# Patient Record
Sex: FEMALE | Race: WHITE | NOT HISPANIC OR LATINO
[De-identification: names, ages, dates, MRNs, and addresses within clinical notes are randomized per-mention and may not be internally consistent; named-entity substitution may affect disease eponyms.]

---

## 2022-04-20 ENCOUNTER — NON-APPOINTMENT (OUTPATIENT)
Age: 52
End: 2022-04-20

## 2022-04-20 ENCOUNTER — APPOINTMENT (OUTPATIENT)
Dept: INTERNAL MEDICINE | Facility: CLINIC | Age: 52
End: 2022-04-20
Payer: COMMERCIAL

## 2022-04-20 VITALS
DIASTOLIC BLOOD PRESSURE: 70 MMHG | OXYGEN SATURATION: 98 % | WEIGHT: 156 LBS | HEIGHT: 68 IN | RESPIRATION RATE: 16 BRPM | TEMPERATURE: 98.6 F | SYSTOLIC BLOOD PRESSURE: 128 MMHG | HEART RATE: 88 BPM | BODY MASS INDEX: 23.64 KG/M2

## 2022-04-20 DIAGNOSIS — Z00.00 ENCOUNTER FOR GENERAL ADULT MEDICAL EXAMINATION W/OUT ABNORMAL FINDINGS: ICD-10-CM

## 2022-04-20 DIAGNOSIS — Z72.89 OTHER PROBLEMS RELATED TO LIFESTYLE: ICD-10-CM

## 2022-04-20 PROCEDURE — 99396 PREV VISIT EST AGE 40-64: CPT | Mod: 25

## 2022-04-20 PROCEDURE — 93000 ELECTROCARDIOGRAM COMPLETE: CPT | Mod: 59

## 2022-04-20 NOTE — COUNSELING
[Behavioral health counseling provided] : Behavioral health counseling provided [Hazards of at-risk alcohol use discussed] : Hazards of at-risk alcohol use discussed [Strategies to reduce or eliminate alcohol use discussed] : Strategies to reduce or eliminate alcohol use discussed [Participate in Treatment Program] : Participate in treatment program

## 2022-04-20 NOTE — PLAN
[FreeTextEntry1] : Do FBW- will call with results.  \par Psychotherapy advised.  Pt has therapist she will contact.  Declines starting SSRI. Encouraged increased exercise, yoga, meditation. \par Must decrease Alcohol intake.  Referred to [facility] for support services.  \par Dental exam q 6 months\par Colonoscopy stressed Referrals provided \par GYN yearly - Referred to . \par Yearly eye exam.\par Derm yearly for skin exam. \par Yearly flu vaccine.\par Discussed Shingrix vaccination at age 52.   Outlined benefits and risks and current recommendation.  \par SHe will consider and obtain vaccination at his local pharmacy.\par \par Stressed importance to maintain a normal body weight by following a  healthy diet  and lifestyle to maintain good health and prevent illness.  \par Diet should consist of lean meats, fish, fruits and vegetables, whole grains and fiber and healthy fats.  White starches, sweets, high fat dairy should be limited. \par  \par \par \par

## 2022-04-20 NOTE — HISTORY OF PRESENT ILLNESS
[FreeTextEntry1] : Establish care/CPE  [de-identified] : Patient is 51 year old female PMH Breast Cancer who presents for CPE and to establish care.  \par She was diagnosed with Breast cancer 2018 and had BL mastectomy with reconstruction.   She did not require chemo/radiation. \leslee Obtains all GYN care at [facility]. She has not had her period in several months.  Last visit 1 1/2 years.   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 \par She has a son who is Autistic, non-verbal who lives in a residential facility.  Drinks wine or vodka 4 times a week and believes she should be cutting back. Intake increased during pandemic.  For the last year having panic attacks, feels  easily frustrated with frequent mood swings.  She denies depression, any SI/HI.  Feels overwhelmed at times due to stress at work and marital situation.  Had seen Psychotherapist in past but not since before pandemic.  \par

## 2022-04-20 NOTE — PHYSICAL EXAM
[Supple] : supple [Normal] : no respiratory distress, lungs were clear to auscultation bilaterally and no accessory muscle use [Normal Rate] : normal rate  [Regular Rhythm] : with a regular rhythm [Normal S1, S2] : normal S1 and S2

## 2022-04-20 NOTE — HEALTH RISK ASSESSMENT
[Never] : Never [Yes] : Yes [4 or more  times a week (4 pts)] : 4 or more  times a week (4 points) [1 or 2 (0 pts)] : 1 or 2 (0 points) [Never (0 pts)] : Never (0 points) [No] : In the past 12 months have you used drugs other than those required for medical reasons? No [Patient reported PAP Smear was normal] : Patient reported PAP Smear was normal [PapSmearDate] : 01/21

## 2022-11-04 RX ORDER — AZITHROMYCIN 500 MG/1
0 TABLET, FILM COATED ORAL
Qty: 0 | Refills: 0 | DISCHARGE
Start: 2022-11-04 | End: 2022-11-08

## 2022-11-28 ENCOUNTER — INPATIENT (INPATIENT)
Facility: HOSPITAL | Age: 52
LOS: 2 days | Discharge: HOME | DRG: 280 | End: 2022-12-01
Attending: HOSPITALIST | Admitting: HOSPITALIST
Payer: COMMERCIAL

## 2022-11-28 ENCOUNTER — RESULT REVIEW (OUTPATIENT)
Age: 52
End: 2022-11-28

## 2022-11-28 VITALS — HEIGHT: 64 IN | WEIGHT: 138.89 LBS

## 2022-11-28 DIAGNOSIS — J90 PLEURAL EFFUSION, NOT ELSEWHERE CLASSIFIED: ICD-10-CM

## 2022-11-28 DIAGNOSIS — E43 UNSPECIFIED SEVERE PROTEIN-CALORIE MALNUTRITION: ICD-10-CM

## 2022-11-28 LAB
ADD ON TEST-SPECIMEN IN LAB: SIGNIFICANT CHANGE UP
ALBUMIN SERPL ELPH-MCNC: 2.2 G/DL — LOW (ref 3.3–5)
ALP SERPL-CCNC: 133 U/L — HIGH (ref 40–120)
ALT FLD-CCNC: 32 U/L — SIGNIFICANT CHANGE UP (ref 12–78)
ANION GAP SERPL CALC-SCNC: 9 MMOL/L — SIGNIFICANT CHANGE UP (ref 5–17)
APPEARANCE UR: ABNORMAL
APTT BLD: 37.5 SEC — HIGH (ref 27.5–35.5)
AST SERPL-CCNC: 195 U/L — HIGH (ref 15–37)
BASE EXCESS BLDV CALC-SCNC: 3.1 MMOL/L — SIGNIFICANT CHANGE UP
BASOPHILS # BLD AUTO: 0.07 K/UL — SIGNIFICANT CHANGE UP (ref 0–0.2)
BASOPHILS NFR BLD AUTO: 0.7 % — SIGNIFICANT CHANGE UP (ref 0–2)
BILIRUB SERPL-MCNC: 8.1 MG/DL — HIGH (ref 0.2–1.2)
BILIRUB UR-MCNC: ABNORMAL
BUN SERPL-MCNC: 14 MG/DL — SIGNIFICANT CHANGE UP (ref 7–23)
CALCIUM SERPL-MCNC: 9.3 MG/DL — SIGNIFICANT CHANGE UP (ref 8.5–10.1)
CHLORIDE SERPL-SCNC: 100 MMOL/L — SIGNIFICANT CHANGE UP (ref 96–108)
CK SERPL-CCNC: 34 U/L — SIGNIFICANT CHANGE UP (ref 26–192)
CO2 BLDV-SCNC: 28 MMOL/L — HIGH (ref 22–26)
CO2 SERPL-SCNC: 27 MMOL/L — SIGNIFICANT CHANGE UP (ref 22–31)
COLOR SPEC: ABNORMAL
CREAT SERPL-MCNC: 0.7 MG/DL — SIGNIFICANT CHANGE UP (ref 0.5–1.3)
DIFF PNL FLD: ABNORMAL
EGFR: 105 ML/MIN/1.73M2 — SIGNIFICANT CHANGE UP
EOSINOPHIL # BLD AUTO: 0.1 K/UL — SIGNIFICANT CHANGE UP (ref 0–0.5)
EOSINOPHIL NFR BLD AUTO: 1 % — SIGNIFICANT CHANGE UP (ref 0–6)
FLUAV AG NPH QL: SIGNIFICANT CHANGE UP
FLUBV AG NPH QL: SIGNIFICANT CHANGE UP
GLUCOSE SERPL-MCNC: 118 MG/DL — HIGH (ref 70–99)
GLUCOSE UR QL: NEGATIVE — SIGNIFICANT CHANGE UP
HCG SERPL-ACNC: <1 MIU/ML — SIGNIFICANT CHANGE UP
HCO3 BLDV-SCNC: 27 MMOL/L — SIGNIFICANT CHANGE UP (ref 22–29)
HCT VFR BLD CALC: 24.8 % — LOW (ref 34.5–45)
HGB BLD-MCNC: 8.6 G/DL — LOW (ref 11.5–15.5)
IMM GRANULOCYTES NFR BLD AUTO: 0.3 % — SIGNIFICANT CHANGE UP (ref 0–0.9)
INR BLD: 1.72 RATIO — HIGH (ref 0.88–1.16)
KETONES UR-MCNC: ABNORMAL
LACTATE SERPL-SCNC: 1.8 MMOL/L — SIGNIFICANT CHANGE UP (ref 0.7–2)
LEUKOCYTE ESTERASE UR-ACNC: ABNORMAL
LYMPHOCYTES # BLD AUTO: 1.84 K/UL — SIGNIFICANT CHANGE UP (ref 1–3.3)
LYMPHOCYTES # BLD AUTO: 19.1 % — SIGNIFICANT CHANGE UP (ref 13–44)
MAGNESIUM SERPL-MCNC: 1.3 MG/DL — LOW (ref 1.6–2.6)
MCHC RBC-ENTMCNC: 34.7 GM/DL — SIGNIFICANT CHANGE UP (ref 32–36)
MCHC RBC-ENTMCNC: 38.6 PG — HIGH (ref 27–34)
MCV RBC AUTO: 111.2 FL — HIGH (ref 80–100)
MONOCYTES # BLD AUTO: 0.65 K/UL — SIGNIFICANT CHANGE UP (ref 0–0.9)
MONOCYTES NFR BLD AUTO: 6.8 % — SIGNIFICANT CHANGE UP (ref 2–14)
NEUTROPHILS # BLD AUTO: 6.93 K/UL — SIGNIFICANT CHANGE UP (ref 1.8–7.4)
NEUTROPHILS NFR BLD AUTO: 72.1 % — SIGNIFICANT CHANGE UP (ref 43–77)
NITRITE UR-MCNC: POSITIVE
NT-PROBNP SERPL-SCNC: 203 PG/ML — HIGH (ref 0–125)
PCO2 BLDV: 38 MMHG — LOW (ref 39–42)
PH BLDV: 7.46 — HIGH (ref 7.32–7.43)
PH UR: 5 — SIGNIFICANT CHANGE UP (ref 5–8)
PLATELET # BLD AUTO: 100 K/UL — LOW (ref 150–400)
PO2 BLDV: 33 MMHG — SIGNIFICANT CHANGE UP
POTASSIUM SERPL-MCNC: 3.3 MMOL/L — LOW (ref 3.5–5.3)
POTASSIUM SERPL-SCNC: 3.3 MMOL/L — LOW (ref 3.5–5.3)
PROT SERPL-MCNC: 8.8 GM/DL — HIGH (ref 6–8.3)
PROT UR-MCNC: 30 MG/DL
PROTHROM AB SERPL-ACNC: 20.1 SEC — HIGH (ref 10.5–13.4)
RBC # BLD: 2.23 M/UL — LOW (ref 3.8–5.2)
RBC # FLD: 15.9 % — HIGH (ref 10.3–14.5)
RSV RNA NPH QL NAA+NON-PROBE: SIGNIFICANT CHANGE UP
SAO2 % BLDV: 56.8 % — SIGNIFICANT CHANGE UP
SARS-COV-2 RNA SPEC QL NAA+PROBE: SIGNIFICANT CHANGE UP
SODIUM SERPL-SCNC: 136 MMOL/L — SIGNIFICANT CHANGE UP (ref 135–145)
SP GR SPEC: 1.03 — HIGH (ref 1.01–1.02)
TROPONIN I, HIGH SENSITIVITY RESULT: 5.8 NG/L — SIGNIFICANT CHANGE UP
TSH SERPL-MCNC: 3.27 UU/ML — SIGNIFICANT CHANGE UP (ref 0.34–4.82)
UROBILINOGEN FLD QL: 4
WBC # BLD: 9.62 K/UL — SIGNIFICANT CHANGE UP (ref 3.8–10.5)
WBC # FLD AUTO: 9.62 K/UL — SIGNIFICANT CHANGE UP (ref 3.8–10.5)

## 2022-11-28 PROCEDURE — 82140 ASSAY OF AMMONIA: CPT

## 2022-11-28 PROCEDURE — 80053 COMPREHEN METABOLIC PANEL: CPT

## 2022-11-28 PROCEDURE — 87086 URINE CULTURE/COLONY COUNT: CPT

## 2022-11-28 PROCEDURE — 86708 HEPATITIS A ANTIBODY: CPT

## 2022-11-28 PROCEDURE — 99223 1ST HOSP IP/OBS HIGH 75: CPT | Mod: GC

## 2022-11-28 PROCEDURE — 71275 CT ANGIOGRAPHY CHEST: CPT | Mod: 26,MA

## 2022-11-28 PROCEDURE — 83540 ASSAY OF IRON: CPT

## 2022-11-28 PROCEDURE — 85610 PROTHROMBIN TIME: CPT

## 2022-11-28 PROCEDURE — 88305 TISSUE EXAM BY PATHOLOGIST: CPT

## 2022-11-28 PROCEDURE — 88312 SPECIAL STAINS GROUP 1: CPT

## 2022-11-28 PROCEDURE — 89051 BODY FLUID CELL COUNT: CPT

## 2022-11-28 PROCEDURE — 71045 X-RAY EXAM CHEST 1 VIEW: CPT | Mod: 26

## 2022-11-28 PROCEDURE — 80048 BASIC METABOLIC PNL TOTAL CA: CPT

## 2022-11-28 PROCEDURE — 86880 COOMBS TEST DIRECT: CPT

## 2022-11-28 PROCEDURE — 85025 COMPLETE CBC W/AUTO DIFF WBC: CPT

## 2022-11-28 PROCEDURE — 93306 TTE W/DOPPLER COMPLETE: CPT

## 2022-11-28 PROCEDURE — 85027 COMPLETE CBC AUTOMATED: CPT

## 2022-11-28 PROCEDURE — 80307 DRUG TEST PRSMV CHEM ANLYZR: CPT

## 2022-11-28 PROCEDURE — 87389 HIV-1 AG W/HIV-1&-2 AB AG IA: CPT

## 2022-11-28 PROCEDURE — 84157 ASSAY OF PROTEIN OTHER: CPT

## 2022-11-28 PROCEDURE — 82607 VITAMIN B-12: CPT

## 2022-11-28 PROCEDURE — 80074 ACUTE HEPATITIS PANEL: CPT

## 2022-11-28 PROCEDURE — 82042 OTHER SOURCE ALBUMIN QUAN EA: CPT

## 2022-11-28 PROCEDURE — 36415 COLL VENOUS BLD VENIPUNCTURE: CPT

## 2022-11-28 PROCEDURE — 86706 HEP B SURFACE ANTIBODY: CPT

## 2022-11-28 PROCEDURE — 49083 ABD PARACENTESIS W/IMAGING: CPT

## 2022-11-28 PROCEDURE — 85385 FIBRINOGEN ANTIGEN: CPT

## 2022-11-28 PROCEDURE — 87075 CULTR BACTERIA EXCEPT BLOOD: CPT

## 2022-11-28 PROCEDURE — 82728 ASSAY OF FERRITIN: CPT

## 2022-11-28 PROCEDURE — 99285 EMERGENCY DEPT VISIT HI MDM: CPT

## 2022-11-28 PROCEDURE — 82525 ASSAY OF COPPER: CPT

## 2022-11-28 PROCEDURE — 81025 URINE PREGNANCY TEST: CPT

## 2022-11-28 PROCEDURE — 82746 ASSAY OF FOLIC ACID SERUM: CPT

## 2022-11-28 PROCEDURE — 86704 HEP B CORE ANTIBODY TOTAL: CPT

## 2022-11-28 PROCEDURE — 86039 ANTINUCLEAR ANTIBODIES (ANA): CPT

## 2022-11-28 PROCEDURE — 82248 BILIRUBIN DIRECT: CPT

## 2022-11-28 PROCEDURE — 85045 AUTOMATED RETICULOCYTE COUNT: CPT

## 2022-11-28 PROCEDURE — 83550 IRON BINDING TEST: CPT

## 2022-11-28 PROCEDURE — 93010 ELECTROCARDIOGRAM REPORT: CPT

## 2022-11-28 PROCEDURE — 85730 THROMBOPLASTIN TIME PARTIAL: CPT

## 2022-11-28 PROCEDURE — 74177 CT ABD & PELVIS W/CONTRAST: CPT | Mod: 26,MA

## 2022-11-28 PROCEDURE — 83615 LACTATE (LD) (LDH) ENZYME: CPT

## 2022-11-28 PROCEDURE — 86255 FLUORESCENT ANTIBODY SCREEN: CPT

## 2022-11-28 PROCEDURE — 88108 CYTOPATH CONCENTRATE TECH: CPT

## 2022-11-28 PROCEDURE — 88342 IMHCHEM/IMCYTCHM 1ST ANTB: CPT

## 2022-11-28 PROCEDURE — 83010 ASSAY OF HAPTOGLOBIN QUANT: CPT

## 2022-11-28 PROCEDURE — 87070 CULTURE OTHR SPECIMN AEROBIC: CPT

## 2022-11-28 RX ORDER — IBUPROFEN 200 MG
600 TABLET ORAL ONCE
Refills: 0 | Status: DISCONTINUED | OUTPATIENT
Start: 2022-11-28 | End: 2022-11-28

## 2022-11-28 RX ORDER — BROMPHENIRAMINE MALEATE, PSEUDOEPHEDRINE HYDROCHLORIDE, AND DEXTROMETHORPHAN HYDROBROMIDE 2; 10; 30 MG/5ML; MG/5ML; MG/5ML
5 SOLUTION ORAL
Qty: 0 | Refills: 0 | DISCHARGE

## 2022-11-28 RX ORDER — SODIUM CHLORIDE 9 MG/ML
500 INJECTION INTRAMUSCULAR; INTRAVENOUS; SUBCUTANEOUS ONCE
Refills: 0 | Status: COMPLETED | OUTPATIENT
Start: 2022-11-28 | End: 2022-11-28

## 2022-11-28 RX ORDER — IBUPROFEN 200 MG
400 TABLET ORAL ONCE
Refills: 0 | Status: COMPLETED | OUTPATIENT
Start: 2022-11-28 | End: 2022-11-28

## 2022-11-28 RX ORDER — PIPERACILLIN AND TAZOBACTAM 4; .5 G/20ML; G/20ML
3.38 INJECTION, POWDER, LYOPHILIZED, FOR SOLUTION INTRAVENOUS ONCE
Refills: 0 | Status: COMPLETED | OUTPATIENT
Start: 2022-11-28 | End: 2022-11-28

## 2022-11-28 RX ORDER — POTASSIUM CHLORIDE 20 MEQ
20 PACKET (EA) ORAL ONCE
Refills: 0 | Status: COMPLETED | OUTPATIENT
Start: 2022-11-28 | End: 2022-11-28

## 2022-11-28 RX ORDER — ONDANSETRON 8 MG/1
4 TABLET, FILM COATED ORAL EVERY 8 HOURS
Refills: 0 | Status: DISCONTINUED | OUTPATIENT
Start: 2022-11-28 | End: 2022-12-01

## 2022-11-28 RX ORDER — LANOLIN ALCOHOL/MO/W.PET/CERES
3 CREAM (GRAM) TOPICAL AT BEDTIME
Refills: 0 | Status: DISCONTINUED | OUTPATIENT
Start: 2022-11-28 | End: 2022-12-01

## 2022-11-28 RX ADMIN — Medication 400 MILLIGRAM(S): at 21:19

## 2022-11-28 RX ADMIN — PIPERACILLIN AND TAZOBACTAM 200 GRAM(S): 4; .5 INJECTION, POWDER, LYOPHILIZED, FOR SOLUTION INTRAVENOUS at 15:14

## 2022-11-28 RX ADMIN — Medication 400 MILLIGRAM(S): at 22:05

## 2022-11-28 RX ADMIN — SODIUM CHLORIDE 500 MILLILITER(S): 9 INJECTION INTRAMUSCULAR; INTRAVENOUS; SUBCUTANEOUS at 15:15

## 2022-11-28 RX ADMIN — Medication 20 MILLIEQUIVALENT(S): at 21:20

## 2022-11-28 NOTE — ED STATDOCS - NS ED ATTENDING STATEMENT MOD
This was a shared visit with the LORE. I reviewed and verified the documentation and independently performed the documented:

## 2022-11-28 NOTE — H&P ADULT - ATTENDING COMMENTS
pt hx, exam, data and A/P discussed w Dr. Choe and pt independently interviewed and examined    VSS    temp not recorded but pt given motrin in ED    #Cirrhosis w portal HTN  #Current alcohol use  #prior /remote cocaine use  1.trend LFT  2. hepatitis panel  3. daily weights  4. IR for paracentesis  5. GI to assist in diuretics, need for beta blocker to reduce portal pressure  starting lactulose if NH3 increased but MS clear  6. discussed follow up w AA, GI as outpt  7. discussed need for nutrition consult to assit in improving diet and eventually albumen manufacture by liver  8. informed of existance of transplant specialists at [facility] as well  9. avoidance of OTC meds except as approved by GI    #Hx breast CA s/p bilat mastectomies and reconstruction w breast surgeon  reported that she did not need to see oncologist ; did not require RT/chemo  did not know ER or OR status but knew she is BRCA neg  1. heme onc consult re next    #Lytic lesions on sternum/manubrium  she is aware of a prominence of her sternum on the R and the breast implant being more obvious on the left   discussed infection, benign and malignant processes  1. will need to discuss approach to dx w heme onc assistance    #Anemia  #Macrocytic w MCV 11  1. B12, folate, TSH  2. iron studies were also requested ;  I was unable to review slide to see if ther were any microcytic cells    60 sec pt hx, exam, data and A/P discussed w Dr. Choe and pt independently interviewed and examined    VSS    temp not recorded but pt given motrin in ED    #Cirrhosis w portal HTN  #Current alcohol use  #prior /remote cocaine use  1.trend LFT  2. hepatitis panel  3. daily weights  4. IR for paracentesis  5. GI to assist in diuretics, need for beta blocker to reduce portal pressure  starting lactulose if NH3 increased but MS clear  6. discussed follow up w AA, GI as outpt  7. discussed need for nutrition consult to assit in improving diet and eventually albumen manufacture by liver  8. informed of existance of transplant specialists at [facility] as well  9. avoidance of OTC meds except as approved by GI    #Hx breast CA s/p bilat mastectomies and reconstruction w breast surgeon  reported that she did not need to see oncologist ; did not require RT/chemo  did not know ER or [facility] status but knew she is BRCA neg  1. heme onc consult re next    #Lytic lesions on sternum/manubrium  she is aware of a prominence of her sternum on the R and the breast implant being more obvious on the left   discussed infection, benign and malignant processes  1. will need to discuss approach to dx w heme onc assistance    #Anemia  #Macrocytic w MCV 11  1. B12, folate, TSH  2. iron studies were also requested ;  I was unable to review slide to see if ther were any microcytic cells    60 minutes

## 2022-11-28 NOTE — H&P ADULT - NSHPLABSRESULTS_GEN_ALL_CORE
history of BRCA gene. Rule out pneumonia or pulmonary embolism.    COMPARISON: None.    CONTRAST/COMPLICATIONS:  IV Contrast: Omnipaque 350 (accession [id number]), IV contrast documented   in associated exam (accession [id number])  90 cc administered   10 cc   discarded  Oral Contrast: Other (accession [id number]), NONE (accession [id number])  Complications: None reported at time of study completion    PROCEDURE:  CT of the Chest, Abdomen and Pelvis was performed.  Sagittal and coronal reformats were performed.    FINDINGS:  CHEST:  LUNGS AND LARGE AIRWAYS: Patent central airways. 5 mm right right lung   pulmonary nodule near the minor fissure, with morphology compatible with   a benign intrapulmonary lymph node.  PLEURA: Small left pleural effusion.  VESSELS: No pulmonary embolism. Normal caliber aorta.  HEART: Heart size is normal. No pericardial effusion.  MEDIASTINUM AND DAVID: No lymphadenopathy. Extensive esophageal and   paraesophageal varices noted.  CHEST WALL AND LOWER NECK: Status post bilateral mastectomy and breast   reconstructions.    ABDOMEN AND PELVIS:  LIVER: Morphologic changes compatible with advanced cirrhosis. No focal   liver lesion.  BILE DUCTS: Normal caliber.  GALLBLADDER: Cholelithiasis.  SPLEEN: Enlarged, measuring 15.6 cm.  PANCREAS: Within normal limits.  ADRENALS: Within normal limits.  KIDNEYS/URETERS: Within normal limits.    BLADDER: Within normal limits.  REPRODUCTIVE ORGANS: Intrauterine device within the uterus. Multiple   uterine fibroids. Unremarkable adnexa.    BOWEL: No bowel obstruction. Appendix is not identified.  PERITONEUM: Moderate volume ascites.  VESSELS: Patent portal veins. Esophageal and paraesophageal varices.   Gastrohepatic ligament and left upper quadrant varices with spontaneous   splenorenal shunt.  RETROPERITONEUM/LYMPH NODES: No lymphadenopathy.  ABDOMINAL WALL: Within normal limits.  BONES: Diffuse lytic change and areas of cortical destruction involving   the sternal manubrium and body.    IMPRESSION:  No pulmonary embolism.    Small left pleural effusion.    Cirrhosis, splenomegaly, portal hypertension and moderate ascites.    Esophageal varices.    Diffuse lytic change of the sternum suspicious for metastatic disease.

## 2022-11-28 NOTE — ED STATDOCS - CARE PLAN
1 Principal Discharge DX:	Liver cirrhosis  Secondary Diagnosis:	Portal hypertension  Secondary Diagnosis:	Ascites  Secondary Diagnosis:	Esophageal varices in cirrhosis  Secondary Diagnosis:	Large pleural effusion

## 2022-11-28 NOTE — H&P ADULT - REASON FOR ADMISSION
Transitional Care Follow Up Visit  Subjective     Faviola Issa is a 69 y.o. female who presents for a transitional care management visit.    Within 48 business hours after discharge our office contacted her via telephone to coordinate her care and needs.      I reviewed and discussed the details of that call along with the discharge summary, hospital problems, inpatient lab results, inpatient diagnostic studies, and consultation reports with Faviola.     Current outpatient and discharge medications have been reconciled for the patient.    No flowsheet data found.  Risk for Readmission (LACE) Score: 7 (5/8/2019  6:00 AM)  having some occipital h/a also since surgery    History of Present Illness   Course During Hospital Stay:  5-6-19 initially had thyroidectomy and parathyroidectomy with Dr. Daly; 8 hours later developed hematoma and had to be surgically drained by Dr Daly same day---he did place one drain. She was to f/u 2 days later..  He is watching thyroid labs and calcium.    Also note pre-op EKG was no change    Dr Romeo does regular colonoscopy--every 3 yrs    I had her see oncology 2017 d/t elevated Ca and alk phos.  With hx of colon cancer and lung cancer, I was concerned if this could be cancer??;  Also has hx lymphoma of eyelid 1988  We now know the Ca was from parathyroid;  Seeing Dr Daly  Gets yearly CXR for work!!    She is still having SOA with walking;   This has been since surgery---had hematoma  She is doing home spirometer and is improving  I will still get CXR and make sure no pneumaonia  ENT wants me to check labs today  Still numbness left upper ext  Current outpatient and discharge medications have been reconciled for the patient.  Reviewed by: Gema Johnson PA-C  X-Ray  Interpretation report in house X-rays that I personally viewed    Relevant Clinical Issues/Diagnoses/Indications: SOA, recent surgery, neck hematoma, hx lung cancer        Clinical Findings:  Fullness right  hilum; upper limits heart size;           Comparative Data:  Not here          Date of Previous X-ray:    Change on current X-ray:      The following portions of the patient's history were reviewed and updated as appropriate: allergies, current medications, past family history, past medical history, past social history, past surgical history and problem list.    Review of Systems   Constitutional: Negative for activity change, appetite change and unexpected weight change.   HENT: Positive for sore throat. Negative for nosebleeds and trouble swallowing.    Eyes: Negative for pain and visual disturbance.   Respiratory: Positive for shortness of breath. Negative for chest tightness and wheezing.    Cardiovascular: Negative for chest pain and palpitations.   Gastrointestinal: Negative for abdominal pain and blood in stool.   Endocrine: Negative.    Genitourinary: Negative for difficulty urinating and hematuria.   Musculoskeletal: Positive for joint swelling and neck pain.   Skin: Negative for color change and rash.   Allergic/Immunologic: Negative.    Neurological: Positive for numbness and headaches. Negative for syncope and speech difficulty.   Hematological: Negative for adenopathy.   Psychiatric/Behavioral: Negative for agitation and confusion.   All other systems reviewed and are negative.      Objective   Physical Exam   Constitutional: She is oriented to person, place, and time. She appears well-developed and well-nourished. No distress.   HENT:   Head: Normocephalic and atraumatic.   Eyes: Conjunctivae and EOM are normal. Pupils are equal, round, and reactive to light. Right eye exhibits no discharge. Left eye exhibits no discharge. No scleral icterus.   Neck: Normal range of motion. Neck supple. No tracheal deviation present. No thyromegaly present.   Edema in incision area neck anterior; sore; incision sore but healing   Cardiovascular: Normal rate, regular rhythm, normal heart sounds, intact distal pulses and  SOB, weight loss, abdominal distention, generalized weakness normal pulses. Exam reveals no gallop.   No murmur heard.  Pulmonary/Chest: Effort normal and breath sounds normal. No respiratory distress. She has no wheezes. She has no rales.   Musculoskeletal: Normal range of motion.   Neurological: She is alert and oriented to person, place, and time. She exhibits normal muscle tone. Coordination normal.   Skin: Skin is warm. No rash noted. No erythema. No pallor.   Anterior neck incision healing   Psychiatric: She has a normal mood and affect. Her behavior is normal. Judgment and thought content normal.   Nursing note and vitals reviewed.      Assessment/Plan   Faviola was seen today for surgery follow up.    Diagnoses and all orders for this visit:    Hospital discharge follow-up  -     Comprehensive Metabolic Panel  -     T4, Free  -     T3, Free  -     TSH  -     CBC & Differential  -     Vitamin D 25 Hydroxy  -     PTH, Intact  -     Calcium, Ionized  -     XR Chest PA & Lateral    Dyspnea, unspecified type  -     Comprehensive Metabolic Panel  -     T4, Free  -     T3, Free  -     TSH  -     CBC & Differential  -     Vitamin D 25 Hydroxy  -     PTH, Intact  -     Calcium, Ionized  -     XR Chest PA & Lateral    Status post parathyroidectomy (CMS/HCC)  -     Comprehensive Metabolic Panel  -     T4, Free  -     T3, Free  -     TSH  -     CBC & Differential  -     Vitamin D 25 Hydroxy  -     PTH, Intact  -     Calcium, Ionized  -     XR Chest PA & Lateral    Postoperative hypothyroidism  -     Comprehensive Metabolic Panel  -     T4, Free  -     T3, Free  -     TSH  -     CBC & Differential  -     Vitamin D 25 Hydroxy  -     PTH, Intact  -     Calcium, Ionized  -     XR Chest PA & Lateral    Malignant neoplasm of sigmoid colon (CMS/HCC)             hosp f/u and concern some SOA; will get CXR  Labs today and fax ENT Dr Daly

## 2022-11-28 NOTE — H&P ADULT - CONVERSATION DETAILS
Pt would like to be DNR/DNI if she is going to be fully bed bound. If she is not, then she would like to be full code. Pt would like to be DNR/DNI if she is going to be fully bed bound. If she is not, then she would like to be full code.      Pt states she does not want to die (ARCurryMD)

## 2022-11-28 NOTE — PATIENT PROFILE ADULT - FUNCTIONAL ASSESSMENT - DAILY ACTIVITY 4.
4 = No assist / stand by assistance Mohs Rapid Report Verbiage: The area of clinically evident tumor was marked with skin marking ink and appropriately hatched.  The initial incision was made following the Mohs approach through the skin.  The specimen was taken to the lab, divided into the necessary number of pieces, chromacoded and processed according to the Mohs protocol.  This was repeated in successive stages until a tumor free defect was achieved.

## 2022-11-28 NOTE — H&P ADULT - NSHPREVIEWOFSYSTEMS_GEN_ALL_CORE
REVIEW OF SYSTEMS:  CONSTITUTIONAL: + weakness, + fevers. No chills  EYES/ENT: + visual changes;  No vertigo or throat pain   RESPIRATORY: No cough, wheezing; + shortness of breath  CARDIOVASCULAR: No chest pain or palpitations  GASTROINTESTINAL: + abdominal distension; No abdominal or epigastric pain. No nausea, vomiting; No diarrhea or constipation.   GENITOURINARY: No dysuria, frequency or hematuria  NEUROLOGICAL: No numbness or weakness  SKIN: No itching, rashes REVIEW OF SYSTEMS:  CONSTITUTIONAL: + weakness, + fevers. No chills +weight loss 16  EYES/ENT: + visual changes;  No vertigo or throat pain   RESPIRATORY: No cough, wheezing; + shortness of breath  CARDIOVASCULAR: No chest pain or palpitations  GASTROINTESTINAL: + abdominal distension; No abdominal or epigastric pain. No nausea, vomiting; No diarrhea or constipation.   GENITOURINARY: No dysuria, frequency or hematuria  NEUROLOGICAL: No numbness or weakness  SKIN: No itching, rashes REVIEW OF SYSTEMS:  CONSTITUTIONAL: + weakness/fatigue, + fevers. No chills +weight loss 16  EYES/ENT: + visual changes;  No vertigo or throat pain   RESPIRATORY: No cough, wheezing; + shortness of breath  CARDIOVASCULAR: No chest pain or palpitations  GASTROINTESTINAL: + abdominal distension; No abdominal or epigastric pain. No nausea, vomiting; No diarrhea or constipation.   GENITOURINARY: No dysuria, frequency or hematuria  NEUROLOGICAL: No numbness or weakness  SKIN: No itching, rashes  Psych anxious about current findings REVIEW OF SYSTEMS:  CONSTITUTIONAL: + weakness/fatigue, + fevers. No chills +weight loss 16  EYES/ENT: + visual changes;  No vertigo or throat pain   RESPIRATORY: No cough, wheezing; + shortness of breath  CARDIOVASCULAR: No chest pain or palpitations  GASTROINTESTINAL: + abdominal distension; No abdominal or epigastric pain. No nausea, vomiting; No diarrhea or constipation.   GENITOURINARY: No dysuria, frequency or hematuria  NEUROLOGICAL: No numbness or weakness  SKIN: No itching, rashes  PSYCH anxious about current findings

## 2022-11-28 NOTE — H&P ADULT - ASSESSMENT
52 year old female with a PMH of Breast CA s/p mastectomy in 2018, COVID in 10/2022, Alcohol abuse disorder (actively participating in AA with sponsor), History of Cocaine use (quit in 1999), Former smoker (quit in 2015) presenting with symptoms of palpitations, shortness of breath, generalized weakness with abdominal distention. Admitted for advanced cirrhosis with portal hypertension.     #Advanced Cirrhosis  #Portal Hypertension  -admit to Med/Surg  -Pt presenting with nonspecific symptoms including fatigue, SOB, anorexia  -History of chronic alcohol abuse, currently daily use. Last drink on 11/24  -CT A/P +moderate ascites, +gastroesophageal/esophageal varices, + cirrhosis +splenomegaly  - CXR + L pleural effusion, r/o involvement of lungs with cirrhosis, consider spironolactone?  - Hepatitis panel pending  - Serum Ammonia: 83  - Blood alcohol <0.10  - Ceruloplasmin, Copper pending  - Iron studies pending  - Hgb 8.6 (unknown baseline), Plt 100  - If Hgb <7, transfuse  - Type and screen; obtain blood consent  - INR 1.72,   - K 3.3, repleted  - Bilirubin 8.1, , , Lipase 534. scleral icterus noted on PE  - Mg 1.3, repleted with 2g IV over 2 hours  - Abdominal distension +fluid shift. IR consult placed for paracentesis  - NPO after midnight    #Suspected Bone METS  - Pt with 16 pound weight loss in about 1-2 years  - CT A/P:     #DVT ppx   52 year old female with a PMH of Breast CA s/p mastectomy in 2018, COVID in 10/2022, Alcohol abuse disorder (actively participating in AA with sponsor), History of Cocaine use (quit in 1999), Former smoker (quit in 2015) presenting with symptoms of palpitations, shortness of breath, generalized weakness with abdominal distention. Admitted for advanced cirrhosis with portal hypertension.     #Advanced Cirrhosis  #Portal Hypertension  -admit to Med/Surg  - Pt presenting with nonspecific symptoms including fatigue, SOB, anorexia  - Reported fever of 101F, given motrin in ED  - History of chronic alcohol abuse, currently daily use. Last drink on 11/24  -CT A/P +moderate ascites, +gastroesophageal/esophageal varices, + cirrhosis +splenomegaly  - CXR + L pleural effusion, r/o involvement of lungs with cirrhosis, consider spironolactone?  - Blood Culture x2 pending, r/o infection/pna  - Hepatitis panel pending  - Serum Ammonia: 83  - Blood alcohol <0.10  - Ceruloplasmin, Copper pending  - Iron studies pending  - Hgb 8.6 (unknown baseline), Plt 100  - If Hgb <7, transfuse  - Type and screen; obtain blood consent  - INR 1.72,   - K 3.3, repleted  - Bilirubin 8.1, , , Lipase 534. scleral icterus noted on PE  - Mg 1.3, repleted with 2g IV over 2 hours  - Abdominal distension +fluid shift. IR consult placed for paracentesis  - NPO after midnight  - Nutrition consult placed    #Suspected Bone METS  - Pt with 16 pound weight loss in about 1-2 years  - CT A/P: + suspected Metastasis to sternum  - ALP elevated at 133    #Abnormal Urinalysis  - Denies urinary symptoms  - UA +moderate bilirubin, proteinuria, bacteruria, nitrites, trace blood and trace LE.   - Urine Culture pending    #DVT ppx    #Disposition:   - SW consult placed  - SBIRT 52 year old female with a PMH of Breast CA s/p mastectomy in 2018, COVID in 10/2022, Alcohol abuse disorder (actively participating in AA with sponsor), History of Cocaine use (quit in 1999), Former smoker (quit in 2015) presenting with symptoms of palpitations, shortness of breath, generalized weakness with abdominal distention. Admitted for advanced cirrhosis with portal hypertension.     #Advanced Cirrhosis  #Portal Hypertension  -admit to Med/Surg  - Pt presenting with nonspecific symptoms including fatigue, SOB, anorexia  - Reported fever of 101F, given motrin in ED  - History of chronic alcohol abuse, currently daily use. Last drink on 11/24  -CT A/P +moderate ascites, +gastroesophageal/esophageal varices, + cirrhosis +splenomegaly  - Child Kilgore Score: 12, life expectancy 1-3 years; Abdominal surgery periop mortality 82%  - MELD-Na: 22- 7-10% estimated 90 day mortality  - CXR + L pleural effusion, r/o involvement of lungs with cirrhosis, consider spironolactone?  - Blood Culture x2 pending, r/o infection/pna  - Hepatitis panel pending  - Serum Ammonia: 83  - Blood alcohol <0.10  - Ceruloplasmin, Copper pending  - Iron studies pending  - Hgb 8.6 (unknown baseline), Plt 100  - If Hgb <7, transfuse  - Type and screen; obtain blood consent  - INR 1.72,   - K 3.3, repleted  - Bilirubin 8.1, , , Lipase 534. scleral icterus noted on PE  - Mg 1.3, repleted with 2g IV over 2 hours  - Abdominal distension +fluid shift. IR consult placed for paracentesis  - NPO after midnight  - Nutrition consult placed    #Suspected Bone METS  - Pt with 16 pound weight loss in about 1-2 years  - CT A/P: + suspected Metastasis to sternum  - ALP elevated at 133    #Abnormal Urinalysis  - Denies urinary symptoms  - UA +moderate bilirubin, proteinuria, bacteruria, nitrites, trace blood and trace LE.   - Urine Culture pending    #DVT ppx    #Disposition:   - SW consult placed  - SBIRT 52 year old female with a PMH of Breast CA s/p mastectomy in 2018, COVID in 10/2022, Alcohol abuse disorder (actively participating in AA with sponsor), History of Cocaine use (quit in 1999), Former smoker (quit in 2015) presenting with symptoms of palpitations, shortness of breath, generalized weakness with abdominal distention. Admitted for advanced cirrhosis with portal hypertension course complicated by suspected metastasis to sternum, Pleural Effusion, Macrocytic Anemia and Abnormal UA.     #Advanced Cirrhosis  #Portal Hypertension  -admit to Med/Surg  - Pt presenting with nonspecific symptoms including fatigue, SOB, anorexia  - Reported fever of 101F, given motrin in ED  - History of chronic alcohol abuse, currently daily use. Last drink on 11/24  - Abdominal distension +fluid shift.  - CT A/P +moderate ascites, +gastroesophageal/esophageal varices, + cirrhosis +splenomegaly  - Child Kilgore Score: 12, life expectancy 1-3 years; Abdominal surgery periop mortality 82%  - MELD-Na: 22- 7-10% estimated 90 day mortality  - Serum Ammonia: 83  - Blood alcohol <0.10  - INR 1.72,   - Bilirubin 8.1, , , Lipase 534; scleral icterus noted on PE  - Mg 1.3, repleted, Phos WNL  - K 3.3, repleted, Na WNL  - Hepatitis panel pending  - Copper pending  - Iron studies pending  - TTE pending   - PO Lactulose, PO thiamine, PO Folate, PO Multivitamin ordered  - IR consult ordered for paracentesis  - GI consult ordered  - NPO after midnight  - Nutrition consult placed for hypoalbuminemia and weight loss    #Macrocytic Anemia   - Hgb 8.6 (unknown baseline)  - MCV : 111.2, suspect macrocytic anemia  - B12 and Folate pending  - Folate supplementation ordered  - Heme/Onc Consult ordered  - If Hgb <7, transfuse  - Type and screen; obtain blood consent    #Pleural Effusion  - pt presenting for complaints of SOB  - On PE, satting within 95-99% on RA, no use of accessory muscles  - CXR + L pleural effusion  - CTA : negative for PE; + small pleural effusion   - Blood Culture x2 pending, r/o infection/pna    #Suspected Bone METS  - Pt with 16 pound weight loss in about 1-2 years  - CT A/P: + suspected Metastasis to sternum  - ALP elevated at 133  - Heme/Onc consult ordered    #Abnormal Urinalysis  - Denies urinary symptoms  - UA +moderate bilirubin, proteinuria, bacteruria, nitrites, trace blood and trace LE.   - will treat presentation like UTI   - Urine Culture pending    #DVT ppx:     #Disposition:   - SW consult placed  - SBIRT 52 year old female with a PMH of Breast CA s/p mastectomy in 2018, COVID in 10/2022, Alcohol abuse disorder (actively participating in AA with sponsor), History of Cocaine use (quit in 1999), Former smoker (quit in 2015) presenting with symptoms of palpitations, shortness of breath, generalized weakness with abdominal distention. Admitted for advanced cirrhosis with portal hypertension course complicated by suspected metastasis to sternum, Pleural Effusion, Macrocytic Anemia and Abnormal UA.     #Advanced Cirrhosis  #Portal Hypertension  -admit to Med/Surg  - Pt presenting with nonspecific symptoms including fatigue, SOB, anorexia  - Reported fever of 101F, given motrin in ED  - History of chronic alcohol abuse, currently daily use. Last drink on 11/24  - Abdominal distension +fluid shift.  - CT A/P +moderate ascites, +gastroesophageal/esophageal varices, + cirrhosis +splenomegaly  - Child Kilgore Score: 12, life expectancy 1-3 years; Abdominal surgery periop mortality 82%  - MELD-Na: 22- 7-10% estimated 90 day mortality  - Serum Ammonia: 83  - Blood alcohol <0.10  - INR 1.72,   - Bilirubin 8.1, , , Lipase 534; scleral icterus noted on PE  - Mg 1.3, repleted, Phos WNL  - K 3.3, repleted, Na WNL  - Hepatitis panel pending  - Copper pending  - Iron studies pending  - TTE pending   - PO Lactulose, PO thiamine, PO Folate, PO Multivitamin ordered  - IR consult ordered for paracentesis  - GI consult ordered  - NPO after midnight  - Nutrition consult placed for hypoalbuminemia and weight loss  - Strict I/Os    #Macrocytic Anemia   - Hgb 8.6 (unknown baseline)  - MCV : 111.2, suspect macrocytic anemia  - B12 and Folate pending  - Folate supplementation ordered  - Heme/Onc Consult ordered  - If Hgb <7, transfuse  - Type and screen; obtain blood consent    #Pleural Effusion  - pt presenting for complaints of SOB  - On PE, satting within 95-99% on RA, no use of accessory muscles  - CXR + L pleural effusion  - CTA : negative for PE; + small pleural effusion   - Blood Culture x2 pending, r/o infection/pna    #Suspected Bone METS  - Pt with 16 pound weight loss in about 1-2 years  - CT A/P: + suspected Metastasis to sternum  - ALP elevated at 133  - Heme/Onc consult ordered    #Abnormal Urinalysis  - Denies urinary symptoms  - UA +moderate bilirubin, proteinuria, bacteruria, nitrites, trace blood and trace LE.   - will treat presentation like UTI   - Urine Culture pending    #DVT ppx: hold at this time    #Disposition:   - SW consult placed  - SBIRT

## 2022-11-28 NOTE — ED STATDOCS - OBJECTIVE STATEMENT
51 y/o female with a PMHx of breast cancer s/p mastectomy in 2018 in remission, COVID in October 2022, varicose veins presents to the ED c/o SOB and nonproductive cough x10 days. Pt was tested for flu and COVID, both resulting negative. +intermittent palpitations, +generalized weakness, +weight loss. Pt noticed her abd is distended. Denies CP. Vaccinated and boosted for COVID. Former smoker. No other complaints at this time.

## 2022-11-28 NOTE — H&P ADULT - HISTORY OF PRESENT ILLNESS
52 year old female with a PMH of Breast CA s/p mastectomy in 2018, COVID in  52 year old female with a PMH of Breast CA s/p mastectomy in 2018, COVID in 10/2022, Alcohol abuse disorder (actively participating in AA with sponsor), History of Cocaine use (quit in 1999), Former smoker (quit in 2015) presenting with symptoms of palpitations, shortness of breath, generalized weakness with abdominal distention. Patient also has complaints of 16 lb weight loss in about the span of a year. Patient reports difficulty breathing while at home with generalized weakness when attempting to get out of bed. She checked her oxygen level with a pulse ox at home and was sating at 91-92% on RA, which prompted her admission to the hospital for suspected residual COVID symptoms. Patient has extensive personal and family history of alcohol use. Last alcoholic drink was on 11/24/22. Reports increase in usage since 2020 when COVID started, with reported daily alcohol usage, mostly vodka. Patient states that use of alcohol and recreational drug use runs in her family. All of her family members are alcoholics. Biological mother is       Fever of 101 in ED, given motrin 52 year old female with a PMH of Breast CA s/p mastectomy in 2018, COVID in 10/2022, Alcohol abuse disorder (actively participating in AA with sponsor), History of Cocaine use (quit in 1999), Former smoker (quit in 2015) presenting with symptoms of palpitations, shortness of breath, generalized weakness with abdominal distention. Patient also has complaints of 16 lb weight loss in about the span of a year. Patient reports difficulty breathing while at home with generalized weakness when attempting to get out of bed. She checked her oxygen level with a pulse ox at home and was sating at 91-92% on RA, which prompted her admission to the hospital for suspected residual COVID symptoms. Patient has extensive personal and family history of alcohol use. Last alcoholic drink was on 11/24/22. Reports increase in usage since 2020 when COVID started, with reported daily alcohol usage, mostly vodka. Patient states that use of alcohol and recreational drug use runs in her family. All of her family members are alcoholics. Patient reports that biological mother is a drug dealer and biological father is a drug addict, as well as siblings, nephews and nieces.       Fever of 101 in ED, given motrin 52 year old female with a PMH of Breast CA s/p mastectomy in 2018, COVID in 10/2022, Alcohol abuse disorder (actively participating in AA with sponsor), History of Cocaine use (quit in 1999), Former smoker (quit in 2015) presenting with symptoms of palpitations, shortness of breath, generalized weakness with abdominal distention. Patient also has complaints of 16 lb weight loss in about the span of a year. Patient reports difficulty breathing while at home with generalized weakness when attempting to get out of bed. She checked her oxygen level with a pulse ox at home and was sating at 91-92% on RA, which prompted her admission to the hospital for suspected residual COVID symptoms. Patient has extensive personal and family history of alcohol use. Last alcoholic drink was on 11/24/22. Reports increase in usage since 2020 when COVID started, with reported daily alcohol usage, mostly vodka. Patient states that use of alcohol and recreational drug use runs in her family. All of her family members are alcoholics. Patient reports that biological mother is a drug dealer and biological father is a drug addict, as well as siblings, nephews and nieces. Denies chest pain, nausea, vomiting, urinary symptoms, diarrhea or constipation. Last bowel movement was this morning. Patient does not have a PCP. Patient has not gotten a colonoscopy. Denies recent travel or extensive travel time. Reports use of compression socks with long car rides.     In the ED VSS except for Fever of 101 in ED, given motrin 52 year old female with a PMH of Breast CA s/p mastectomy in 2018, COVID in 10/2022, Alcohol abuse disorder (actively participating in AA with sponsor), History of Cocaine use (quit in 1999), Former smoker (quit in 2015) presenting with symptoms of palpitations, shortness of breath, generalized weakness with abdominal distention. Patient also has complaints of 16 lb weight loss in about the span of a year. Patient reports difficulty breathing while at home with generalized weakness when attempting to get out of bed. She checked her oxygen level with a pulse ox at home and was sating at 91-92% on RA, which prompted her admission to the hospital for suspected residual COVID symptoms. Patient has extensive personal and family history of alcohol use. Last alcoholic drink was on 11/24/22. Reports increase in usage since 2020 when COVID started, with reported daily alcohol usage, mostly vodka. No prior hospitalizations for alcohol use, No prior treatment for alcohol use, no history of seizures/delirium tremens. Patient states that use of alcohol and recreational drug use runs in her family. All of her family members are alcoholics. Patient reports that biological mother is a drug dealer and biological father is a drug addict, as well as siblings, nephews and nieces. Denies chest pain, nausea, vomiting, urinary symptoms, diarrhea or constipation. Last bowel movement was this morning. Patient does not have a PCP. Patient has not gotten a colonoscopy. Denies recent travel or extensive travel time. Reports use of compression socks with long car rides.     In the ED VSS except for reported fever of 101 in ED. Patient was given motrin. Satting at 97%. Hgb 8.6 (unknown baseline), Plt 100, INR 1.72, K 3.3, Bilirubin 8.1, , , Mg 1.3, . UA +moderate bilirubin, proteinuria, bacteruria, nitrites, trace blood and trace LE. Negative for COVID, RSV, Influenza. CXR: Blunting of left costophrenic angle, suspect L pleural effusion with rounded opacity in left lung. CT A/P: +cirrhosis with portal HTN with moderate volume ascites. + diffuse lytic change of sternum suspicious for metastatic disease. CTA Chest: negative for PE. + small L pleural effusion.  52 year old female with a PMH of Breast CA s/p mastectomy in 2018, COVID in 10/2022, Alcohol abuse disorder (actively participating in AA with sponsor), History of Cocaine use (quit in 1999), Former smoker (quit in 2015) presenting with symptoms of palpitations, shortness of breath, generalized weakness with abdominal distention.     Patient also has complaints of 16 lb weight loss in about the span of a year. Patient reports difficulty breathing while at home with generalized weakness when attempting to get out of bed. She checked her oxygen level with a pulse ox at home and was sating at 91-92% on RA, which prompted her admission to the hospital for suspected residual COVID symptoms. Patient has extensive personal and family history of alcohol use. Last alcoholic drink was on 11/24/22. Reports increase in usage since 2020 when COVID started, with reported daily alcohol usage, mostly vodka. No prior hospitalizations for alcohol use, No prior treatment for alcohol use, no history of seizures/delirium tremens. Patient states that use of alcohol and recreational drug use runs in her family. All of her family members are alcoholics. Patient reports that biological mother is a drug dealer and biological father is a drug addict, as well as siblings, nephews and nieces. Denies chest pain, nausea, vomiting, urinary symptoms, diarrhea or constipation. Last bowel movement was this morning. Patient does not have a PCP. Patient has not gotten a colonoscopy. Denies recent travel or extensive travel time. Reports use of compression socks with long car rides.     In the ED VSS except for reported fever of 101 in ED. Patient was given motrin. Satting at 97%. Hgb 8.6 (unknown baseline), Plt 100, INR 1.72, K 3.3, Bilirubin 8.1, , , Mg 1.3, . UA +moderate bilirubin, proteinuria, bacteruria, nitrites, trace blood and trace LE. Negative for COVID, RSV, Influenza. CXR: Blunting of left costophrenic angle, suspect L pleural effusion with rounded opacity in left lung. CT A/P: +cirrhosis with portal HTN with moderate volume ascites. + diffuse lytic change of sternum suspicious for metastatic disease. CTA Chest: negative for PE. + small L pleural effusion.

## 2022-11-28 NOTE — ED ADULT NURSE NOTE - NS ED NOTE ABUSE SUSPICION NEGLECT YN
-- DO NOT REPLY / DO NOT REPLY ALL --  -- Message is from the [facility]--    Patient is requesting a medication refill - medication is on active list, but patient is requesting a change to the medication prescription    Change requested: Patient states that she takes this only once a day but prescription states twice a day.  Please clarify.    RX Name and Dose:   lovastatin (MEVACOR) 20 MG tablet    Duration: n/a days    Pharmacy  [facility] [address] [place] [address]    Patient confirmed the above pharmacy as correct?  Yes    Caller Information       Type Contact Phone    03/03/2021 12:15 PM CST Phone (Incoming) [address] [facility] (Pharmacy) [phone]          Alternative phone number: none    Turnaround time given to caller:   \"This message will be sent to [state Provider's name]. The clinical team will fulfill your request as soon as they review your message.\"  
LVMTCB for clarification  
No

## 2022-11-28 NOTE — H&P ADULT - NSHPPHYSICALEXAM_GEN_ALL_CORE
VITALS:   T(C): 37.2 (11-29-22 @ 00:00), Max: 38.3 (11-28-22 @ 20:28)  HR: 93 (11-29-22 @ 00:00) (93 - 110)  BP: 119/62 (11-29-22 @ 00:00) (101/54 - 123/72)  RR: 18 (11-29-22 @ 00:00) (18 - 19)  SpO2: 92% (11-29-22 @ 00:00) (92% - 99%)    GENERAL: NAD, lying in bed comfortably; emotional; episodes of tearfulness  HEAD:  Atraumatic, Normocephalic  EYES: EOMI, +scleral icterus; no strabismus  ENT: Moist mucous membranes  NECK: Supple  RESPIRATORY: Clear to auscultation bilaterally; No rales, rhonchi, wheezing, or rubs. Unlabored respirations  CARDIOVASCULAR: Regular rate; + murmur, No rubs, or gallops  GASTROINTESTINAL: BSx4; Soft, nontender, +distension +fluid shift  EXTREMITIES: + edema L>R; 2+ Peripheral Pulses, brisk capillary refill. No clubbing, cyanosis  NEUROLOGICAL:  A&Ox3, no focal deficits   SKIN: No rashes or lesions VITALS:   T(C): 37.2 (11-29-22 @ 00:00), Max: 38.3 (11-28-22 @ 20:28)  HR: 93 (11-29-22 @ 00:00) (93 - 110)  BP: 119/62 (11-29-22 @ 00:00) (101/54 - 123/72)  RR: 18 (11-29-22 @ 00:00) (18 - 19)  SpO2: 92% (11-29-22 @ 00:00) (92% - 99%)    GENERAL: NAD, lying in bed comfortably; emotional; episodes of tearfulness; looking younger than her stated age  HEAD:  Atraumatic, Normocephalic  EYES: EOMI, +scleral icterus; no strabismus  ENT: Moist mucous membranes  NECK: Supple  RESPIRATORY: Clear to auscultation bilaterally; No rales, rhonchi, wheezing, or rubs. Unlabored respirations  CARDIOVASCULAR: Regular rate; + murmur, No rubs, or gallops  GASTROINTESTINAL: BSx4; Soft, nontender, +distension +fluid shift+splenomegaly detected by percussion  EXTREMITIES: + edema L>R; 2+ Peripheral Pulses, brisk capillary refill. No clubbing, cyanosis  NEUROLOGICAL:  A&Ox3, no focal deficits   SKIN: No rashes or lesions  PSYCH anxious

## 2022-11-28 NOTE — PHARMACOTHERAPY INTERVENTION NOTE - COMMENTS
Medication reconciliation completed.  Reviewed Medication list and confirmed med allergies with patient; confirmed with Dr. First Meddiane.

## 2022-11-28 NOTE — ED ADULT TRIAGE NOTE - CHIEF COMPLAINT QUOTE
shortness of breath and weakness x10 days. tested negative for flu, pna, covid and symptoms are worsening. pt reports she has noticed she is losing weight and her abdomen appears distended. denies cardiac hx. has been trying to contact PCP but unable to get in touch x4 days.

## 2022-11-28 NOTE — H&P ADULT - NSICDXFAMILYHX_GEN_ALL_CORE_FT
FAMILY HISTORY:  Family hx of hypertension  FH: diabetes mellitus    Father  Still living? Unknown  Family history of heart disease, Age at diagnosis: Age Unknown    Mother  Still living? Unknown  Family history of breast cancer in mother, Age at diagnosis: Age Unknown  Family history of cervical cancer, Age at diagnosis: Age Unknown    Sibling  Still living? Unknown  Family history of cervical cancer, Age at diagnosis: Age Unknown

## 2022-11-28 NOTE — ED STATDOCS - NS ED ROS FT
Constitutional: No fevers, chills, or sweats. +generalized weakness, +weight loss   Cardiac: No chest pain, exertional dyspnea, orthopnea. +palpitations  Respiratory: + shortness of breath, + cough  GI: No abdominal pain, no N/V/D. +abd distention   Neuro: No headaches, no neck pain/stiffness, no numbness  All other systems reviewed and are negative unless otherwise stated in the HPI.

## 2022-11-28 NOTE — ED STATDOCS - NS_ ATTENDINGSCRIBEDETAILS _ED_A_ED_FT
I, Robert Mishra MD,  performed the initial face to face bedside interview with this patient regarding history of present illness, review of symptoms and relevant past medical, social and family history.  I completed an independent physical examination.  I was the initial provider who evaluated this patient. I have signed out the follow up of any pending tests (i.e. labs, radiological studies) to the LORE.  I have communicated the patient’s plan of care and disposition with the LORE.  The history, relevant review of systems, past medical and surgical history, medical decision making, and physical examination was documented by the scribe in my presence and I attest to the accuracy of the documentation.
written material/pre-op instructions, surgical wash & pain management reviewed/individual instruction/verbal instruction

## 2022-11-28 NOTE — H&P ADULT - NSHPOUTPATIENTPROVIDERS_GEN_ALL_CORE
HAMIDA Klein retired; pt has not selected a replacement  Breast surgeon was Dr. Parson at [facility]

## 2022-11-28 NOTE — ED STATDOCS - PHYSICAL EXAMINATION
General: AAOx3, NAD  HEENT: NCAT  Cardiac: Normal rate and rhythym, no murmurs, normal peripheral perfusion  Respiratory: Normal rate and effort. CTAB  GI: Soft, nondistended, nontender  Neuro: No focal deficits. RICHTER equally x4, sensation to light touch intact throughout  MSK: FROMx4, no focal bony tenderness, no peripheral edema  Skin: No rash General: AAOx3, NAD  HEENT: NCAT. Scleral icterus   Cardiac: Tachycardic and rhythym, no murmurs, normal peripheral perfusion  Respiratory: Normal rate and effort. CTAB  GI: Soft, distended, +fluid wave, nontender, nonrigid   Neuro: No focal deficits. RICHTER equally x4, sensation to light touch intact throughout  MSK: FROMx4, no focal bony tenderness trace edema and varicose veins b/l LE.   Skin: No rash

## 2022-11-28 NOTE — ED ADULT NURSE NOTE - OBJECTIVE STATEMENT
pt presents to the ED c/o FAIR and SOB. pt states she had covid in october and ever since then has not recovered. pt states she took a shower today and had to take a nap afterwards because she was so fatigued. Pt reports hx daily drinking. Pt is awake and alert, following commands appropriately. Ambulatory in ED.

## 2022-11-28 NOTE — ED STATDOCS - CLINICAL SUMMARY MEDICAL DECISION MAKING FREE TEXT BOX
Pt with worsening SOB, abd distention, jaundice. Will CTA chest, CT abd pelvis, EKG, labs, likely admit.

## 2022-11-28 NOTE — H&P ADULT - NSICDXPASTMEDICALHX_GEN_ALL_CORE_FT
PAST MEDICAL HISTORY:  Chronic alcohol abuse     Former smoker quit in 2015    History of cocaine use quit in 1999      2019 novel coronavirus disease (COVID-19)     Breast cancer s/p bilateral mastectomy in 2018

## 2022-11-28 NOTE — ED STATDOCS - ATTENDING APP SHARED VISIT CONTRIBUTION OF CARE
I, Robert Mishra MD, personally saw the patient with LORE.  I have personally performed a face to face diagnostic evaluation on this patient.  I have reviewed the LORE note and agree with the history, exam, and plan of care, except as noted.

## 2022-11-29 DIAGNOSIS — Z98.891 HISTORY OF UTERINE SCAR FROM PREVIOUS SURGERY: Chronic | ICD-10-CM

## 2022-11-29 DIAGNOSIS — R09.89 OTHER SPECIFIED SYMPTOMS AND SIGNS INVOLVING THE CIRCULATORY AND RESPIRATORY SYSTEMS: ICD-10-CM

## 2022-11-29 DIAGNOSIS — Z90.13 ACQUIRED ABSENCE OF BILATERAL BREASTS AND NIPPLES: Chronic | ICD-10-CM

## 2022-11-29 LAB
ALBUMIN FLD-MCNC: 0.4 G/DL — SIGNIFICANT CHANGE UP
ALBUMIN SERPL ELPH-MCNC: 1.8 G/DL — LOW (ref 3.3–5)
ALP SERPL-CCNC: 111 U/L — SIGNIFICANT CHANGE UP (ref 40–120)
ALT FLD-CCNC: 24 U/L — SIGNIFICANT CHANGE UP (ref 12–78)
AMMONIA BLD-MCNC: 83 UMOL/L — HIGH (ref 11–32)
ANION GAP SERPL CALC-SCNC: 7 MMOL/L — SIGNIFICANT CHANGE UP (ref 5–17)
AST SERPL-CCNC: 143 U/L — HIGH (ref 15–37)
BASOPHILS # BLD AUTO: 0.04 K/UL — SIGNIFICANT CHANGE UP (ref 0–0.2)
BASOPHILS NFR BLD AUTO: 0.6 % — SIGNIFICANT CHANGE UP (ref 0–2)
BILIRUB SERPL-MCNC: 5.6 MG/DL — HIGH (ref 0.2–1.2)
BUN SERPL-MCNC: 14 MG/DL — SIGNIFICANT CHANGE UP (ref 7–23)
CALCIUM SERPL-MCNC: 8.9 MG/DL — SIGNIFICANT CHANGE UP (ref 8.5–10.1)
CHLORIDE SERPL-SCNC: 104 MMOL/L — SIGNIFICANT CHANGE UP (ref 96–108)
CO2 SERPL-SCNC: 27 MMOL/L — SIGNIFICANT CHANGE UP (ref 22–31)
CREAT SERPL-MCNC: 0.5 MG/DL — SIGNIFICANT CHANGE UP (ref 0.5–1.3)
EGFR: 114 ML/MIN/1.73M2 — SIGNIFICANT CHANGE UP
EOSINOPHIL # BLD AUTO: 0.17 K/UL — SIGNIFICANT CHANGE UP (ref 0–0.5)
EOSINOPHIL NFR BLD AUTO: 2.4 % — SIGNIFICANT CHANGE UP (ref 0–6)
ETHANOL SERPL-MCNC: <10 MG/DL — SIGNIFICANT CHANGE UP (ref 0–10)
FERRITIN SERPL-MCNC: 784 NG/ML — HIGH (ref 15–150)
FOLATE SERPL-MCNC: 12.7 NG/ML — SIGNIFICANT CHANGE UP
GLUCOSE SERPL-MCNC: 104 MG/DL — HIGH (ref 70–99)
GRAM STN FLD: SIGNIFICANT CHANGE UP
HAV IGM SER-ACNC: SIGNIFICANT CHANGE UP
HBV CORE IGM SER-ACNC: SIGNIFICANT CHANGE UP
HBV SURFACE AG SER-ACNC: SIGNIFICANT CHANGE UP
HCT VFR BLD CALC: 21.8 % — LOW (ref 34.5–45)
HCV AB S/CO SERPL IA: 0.26 S/CO — SIGNIFICANT CHANGE UP (ref 0–0.99)
HCV AB SERPL-IMP: SIGNIFICANT CHANGE UP
HGB BLD-MCNC: 7.2 G/DL — LOW (ref 11.5–15.5)
HIV 1+2 AB+HIV1 P24 AG SERPL QL IA: SIGNIFICANT CHANGE UP
IMM GRANULOCYTES NFR BLD AUTO: 0.4 % — SIGNIFICANT CHANGE UP (ref 0–0.9)
IRON SATN MFR SERPL: 52 % — HIGH (ref 14–50)
IRON SATN MFR SERPL: 72 UG/DL — SIGNIFICANT CHANGE UP (ref 30–160)
LDH SERPL L TO P-CCNC: 182 U/L — SIGNIFICANT CHANGE UP (ref 84–241)
LYMPHOCYTES # BLD AUTO: 1.48 K/UL — SIGNIFICANT CHANGE UP (ref 1–3.3)
LYMPHOCYTES # BLD AUTO: 21.3 % — SIGNIFICANT CHANGE UP (ref 13–44)
MCHC RBC-ENTMCNC: 33 GM/DL — SIGNIFICANT CHANGE UP (ref 32–36)
MCHC RBC-ENTMCNC: 36.9 PG — HIGH (ref 27–34)
MCV RBC AUTO: 111.8 FL — HIGH (ref 80–100)
MONOCYTES # BLD AUTO: 0.52 K/UL — SIGNIFICANT CHANGE UP (ref 0–0.9)
MONOCYTES NFR BLD AUTO: 7.5 % — SIGNIFICANT CHANGE UP (ref 2–14)
NEUTROPHILS # BLD AUTO: 4.7 K/UL — SIGNIFICANT CHANGE UP (ref 1.8–7.4)
NEUTROPHILS NFR BLD AUTO: 67.8 % — SIGNIFICANT CHANGE UP (ref 43–77)
PLATELET # BLD AUTO: 90 K/UL — LOW (ref 150–400)
POTASSIUM SERPL-MCNC: 3.4 MMOL/L — LOW (ref 3.5–5.3)
POTASSIUM SERPL-SCNC: 3.4 MMOL/L — LOW (ref 3.5–5.3)
PROT FLD-MCNC: 1.2 G/DL — SIGNIFICANT CHANGE UP
PROT SERPL-MCNC: 7.6 GM/DL — SIGNIFICANT CHANGE UP (ref 6–8.3)
RBC # BLD: 1.95 M/UL — LOW (ref 3.8–5.2)
RBC # FLD: 15.9 % — HIGH (ref 10.3–14.5)
SODIUM SERPL-SCNC: 138 MMOL/L — SIGNIFICANT CHANGE UP (ref 135–145)
SPECIMEN SOURCE: SIGNIFICANT CHANGE UP
TIBC SERPL-MCNC: 137 UG/DL — LOW (ref 220–430)
UIBC SERPL-MCNC: 66 UG/DL — LOW (ref 110–370)
VIT B12 SERPL-MCNC: 909 PG/ML — SIGNIFICANT CHANGE UP (ref 232–1245)
VIT B12 SERPL-MCNC: 968 PG/ML — SIGNIFICANT CHANGE UP (ref 232–1245)
WBC # BLD: 6.94 K/UL — SIGNIFICANT CHANGE UP (ref 3.8–10.5)
WBC # FLD AUTO: 6.94 K/UL — SIGNIFICANT CHANGE UP (ref 3.8–10.5)

## 2022-11-29 PROCEDURE — 93306 TTE W/DOPPLER COMPLETE: CPT | Mod: 26

## 2022-11-29 PROCEDURE — 99223 1ST HOSP IP/OBS HIGH 75: CPT

## 2022-11-29 PROCEDURE — 49083 ABD PARACENTESIS W/IMAGING: CPT

## 2022-11-29 PROCEDURE — 88108 CYTOPATH CONCENTRATE TECH: CPT | Mod: 26

## 2022-11-29 PROCEDURE — 99233 SBSQ HOSP IP/OBS HIGH 50: CPT | Mod: GC

## 2022-11-29 RX ORDER — POTASSIUM CHLORIDE 20 MEQ
40 PACKET (EA) ORAL EVERY 4 HOURS
Refills: 0 | Status: COMPLETED | OUTPATIENT
Start: 2022-11-29 | End: 2022-11-30

## 2022-11-29 RX ORDER — POTASSIUM CHLORIDE 20 MEQ
40 PACKET (EA) ORAL EVERY 4 HOURS
Refills: 0 | Status: DISCONTINUED | OUTPATIENT
Start: 2022-11-29 | End: 2022-11-29

## 2022-11-29 RX ORDER — THIAMINE MONONITRATE (VIT B1) 100 MG
100 TABLET ORAL DAILY
Refills: 0 | Status: DISCONTINUED | OUTPATIENT
Start: 2022-11-29 | End: 2022-12-01

## 2022-11-29 RX ORDER — FUROSEMIDE 40 MG
40 TABLET ORAL DAILY
Refills: 0 | Status: DISCONTINUED | OUTPATIENT
Start: 2022-11-29 | End: 2022-12-01

## 2022-11-29 RX ORDER — INFLUENZA VIRUS VACCINE 15; 15; 15; 15 UG/.5ML; UG/.5ML; UG/.5ML; UG/.5ML
0.5 SUSPENSION INTRAMUSCULAR ONCE
Refills: 0 | Status: DISCONTINUED | OUTPATIENT
Start: 2022-11-29 | End: 2022-12-01

## 2022-11-29 RX ORDER — LACTULOSE 10 G/15ML
20 SOLUTION ORAL
Refills: 0 | Status: DISCONTINUED | OUTPATIENT
Start: 2022-11-29 | End: 2022-11-30

## 2022-11-29 RX ORDER — SPIRONOLACTONE 25 MG/1
100 TABLET, FILM COATED ORAL DAILY
Refills: 0 | Status: DISCONTINUED | OUTPATIENT
Start: 2022-11-29 | End: 2022-12-01

## 2022-11-29 RX ORDER — FOLIC ACID 0.8 MG
1 TABLET ORAL DAILY
Refills: 0 | Status: DISCONTINUED | OUTPATIENT
Start: 2022-11-29 | End: 2022-12-01

## 2022-11-29 RX ORDER — MAGNESIUM SULFATE 500 MG/ML
2 VIAL (ML) INJECTION ONCE
Refills: 0 | Status: COMPLETED | OUTPATIENT
Start: 2022-11-29 | End: 2022-11-29

## 2022-11-29 RX ADMIN — LACTULOSE 20 GRAM(S): 10 SOLUTION ORAL at 21:36

## 2022-11-29 RX ADMIN — Medication 1 MILLIGRAM(S): at 10:33

## 2022-11-29 RX ADMIN — LACTULOSE 20 GRAM(S): 10 SOLUTION ORAL at 10:43

## 2022-11-29 RX ADMIN — Medication 25 GRAM(S): at 04:08

## 2022-11-29 RX ADMIN — SPIRONOLACTONE 100 MILLIGRAM(S): 25 TABLET, FILM COATED ORAL at 16:49

## 2022-11-29 RX ADMIN — Medication 40 MILLIEQUIVALENT(S): at 21:36

## 2022-11-29 RX ADMIN — Medication 100 MILLIGRAM(S): at 10:33

## 2022-11-29 RX ADMIN — Medication 40 MILLIGRAM(S): at 16:49

## 2022-11-29 RX ADMIN — Medication 1 TABLET(S): at 10:34

## 2022-11-29 RX ADMIN — Medication 40 MILLIEQUIVALENT(S): at 18:30

## 2022-11-29 NOTE — CONSULT NOTE ADULT - ASSESSMENT
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  Needs diagnostic EGD - may be able to arrange prior to d/c or timely outpatient  Start diuretics

## 2022-11-29 NOTE — CONSULT NOTE ADULT - SUBJECTIVE AND OBJECTIVE BOX
HPI:  52 year old female with a PMH of Breast CA s/p mastectomy in 2018, COVID in 10/2022, Alcohol abuse disorder (actively participating in AA with sponsor), History of Cocaine use (quit in ), Former smoker (quit in ) presenting with symptoms of palpitations, shortness of breath, generalized weakness with abdominal distention.     Patient also has complaints of 16 lb weight loss in about the span of a year. Patient reports difficulty breathing while at home with generalized weakness when attempting to get out of bed. She checked her oxygen level with a pulse ox at home and was sating at 91-92% on RA, which prompted her admission to the hospital for suspected residual COVID symptoms. Patient has extensive personal and family history of alcohol use. Last alcoholic drink was on 22. Reports increase in usage since  when COVID started, with reported daily alcohol usage, mostly vodka. No prior hospitalizations for alcohol use, No prior treatment for alcohol use, no history of seizures/delirium tremens. Patient states that use of alcohol and recreational drug use runs in her family. All of her family members are alcoholics. Patient reports that biological mother is a drug dealer and biological father is a drug addict, as well as siblings, nephews and nieces. Denies chest pain, nausea, vomiting, urinary symptoms, diarrhea or constipation. Last bowel movement was this morning. Patient does not have a PCP. Patient has not gotten a colonoscopy. Denies recent travel or extensive travel time. Reports use of compression socks with long car rides.     In the ED VSS except for reported fever of 101 in ED. Patient was given motrin. Satting at 97%. Hgb 8.6 (unknown baseline), Plt 100, INR 1.72, K 3.3, Bilirubin 8.1, , , Mg 1.3, . UA +moderate bilirubin, proteinuria, bacteruria, nitrites, trace blood and trace LE. Negative for COVID, RSV, Influenza. CXR: Blunting of left costophrenic angle, suspect L pleural effusion with rounded opacity in left lung. CT A/P: +cirrhosis with portal HTN with moderate volume ascites. + diffuse lytic change of sternum suspicious for metastatic disease. CTA Chest: negative for PE. + small L pleural effusion.  (2022 20:56)  --------------------  Denies h/o cirrhosis or ascites but admits to ongoing EtOH    PAST MEDICAL & SURGICAL HISTORY:  Chronic alcohol abuse      History of cocaine use  quit in       Former smoker  quit in       History of bilateral mastectomy      History of           Home Medications:  Multiple Vitamins oral tablet: 1 tab(s) orally once a day (2022 16:54)      MEDICATIONS  (STANDING):  folic acid 1 milliGRAM(s) Oral daily  influenza   Vaccine 0.5 milliLiter(s) IntraMuscular once  lactulose Syrup 20 Gram(s) Oral two times a day  multivitamin 1 Tablet(s) Oral daily  thiamine 100 milliGRAM(s) Oral daily    MEDICATIONS  (PRN):  aluminum hydroxide/magnesium hydroxide/simethicone Suspension 30 milliLiter(s) Oral every 4 hours PRN Dyspepsia  melatonin 3 milliGRAM(s) Oral at bedtime PRN Insomnia  ondansetron Injectable 4 milliGRAM(s) IV Push every 8 hours PRN Nausea and/or Vomiting      Allergies    No Known Allergies    Intolerances        SOCIAL HISTORY:    FAMILY HISTORY:  Family history of heart disease (Father)    Family history of breast cancer in mother (Mother)    Family history of cervical cancer (Mother, Sibling)    FH: diabetes mellitus    Family hx of hypertension        ROS  As above  Otherwise unremarkable    Vital Signs Last 24 Hrs  T(C): 36.8 (2022 07:19), Max: 38.3 (2022 20:28)  T(F): 98.2 (2022 07:19), Max: 101 (2022 20:28)  HR: 85 (2022 07:19) (85 - 106)  BP: 106/64 (2022 07:19) (101/54 - 123/72)  BP(mean): 69 (2022 20:28) (67 - 69)  RR: 17 (2022 07:19) (17 - 19)  SpO2: 93% (2022 07:19) (92% - 99%)    Parameters below as of 2022 07:19  Patient On (Oxygen Delivery Method): room air        Constitutional: NAD, jaundice  Respiratory: CTAB  Cardiovascular: S1 and S2, RRR  Gastrointestinal: BS+, soft, NT, large ascites  Extremities: No peripheral edema  Psychiatric: Normal mood, normal affect  Skin: No rashes    LABS:                        7.2    6.94  )-----------( 90       ( 2022 07:38 )             21.8         138  |  104  |  14  ----------------------------<  104<H>  3.4<L>   |  27  |  0.50    Ca    8.9      2022 07:38  Phos  4.3       Mg     1.3         TPro  7.6  /  Alb  1.8<L>  /  TBili  5.6<H>  /  DBili  x   /  AST  143<H>  /  ALT  24  /  AlkPhos  111      PT/INR - ( 2022 14:09 )   PT: 20.1 sec;   INR: 1.72 ratio         PTT - ( 2022 14:09 )  PTT:37.5 sec  LIVER FUNCTIONS - ( 2022 07:38 )  Alb: 1.8 g/dL / Pro: 7.6 gm/dL / ALK PHOS: 111 U/L / ALT: 24 U/L / AST: 143 U/L / GGT: x             RADIOLOGY & ADDITIONAL STUDIES:
HPI:  52 year old female with a PMH of Breast CA s/p mastectomy in 2018, COVID in 10/2022, Alcohol abuse disorder (actively participating in AA with sponsor), History of Cocaine use (quit in ), Former smoker (quit in ) presenting with symptoms of palpitations, shortness of breath, generalized weakness with abdominal distention.     Patient also has complaints of 16 lb weight loss in about the span of a year. Patient reports difficulty breathing while at home with generalized weakness when attempting to get out of bed. She checked her oxygen level with a pulse ox at home and was sating at 91-92% on RA, which prompted her admission to the hospital for suspected residual COVID symptoms. Patient has extensive personal and family history of alcohol use. Last alcoholic drink was on 22. Reports increase in usage since  when COVID started, with reported daily alcohol usage, mostly vodka. No prior hospitalizations for alcohol use, No prior treatment for alcohol use, no history of seizures/delirium tremens. Patient states that use of alcohol and recreational drug use runs in her family. All of her family members are alcoholics. Patient reports that biological mother is a drug dealer and biological father is a drug addict, as well as siblings, nephews and nieces. Denies chest pain, nausea, vomiting, urinary symptoms, diarrhea or constipation. Last bowel movement was this morning. Patient does not have a PCP. Patient has not gotten a colonoscopy. Denies recent travel or extensive travel time. Reports use of compression socks with long car rides.     In the ED VSS except for reported fever of 101 in ED. Patient was given motrin. Satting at 97%. Hgb 8.6 (unknown baseline), Plt 100, INR 1.72, K 3.3, Bilirubin 8.1, , , Mg 1.3, . UA +moderate bilirubin, proteinuria, bacteruria, nitrites, trace blood and trace LE. Negative for COVID, RSV, Influenza. CXR: Blunting of left costophrenic angle, suspect L pleural effusion with rounded opacity in left lung. CT A/P: +cirrhosis with portal HTN with moderate volume ascites. + diffuse lytic change of sternum suspicious for metastatic disease. CTA Chest: negative for PE. + small L pleural effusion.  (2022 20:56)      PAST MEDICAL & SURGICAL HISTORY:  Chronic alcohol abuse      History of cocaine use  quit in       Former smoker  quit in       History of bilateral mastectomy      History of           MEDICATIONS  (STANDING):  folic acid 1 milliGRAM(s) Oral daily  influenza   Vaccine 0.5 milliLiter(s) IntraMuscular once  lactulose Syrup 20 Gram(s) Oral two times a day  multivitamin 1 Tablet(s) Oral daily  thiamine 100 milliGRAM(s) Oral daily    MEDICATIONS  (PRN):  aluminum hydroxide/magnesium hydroxide/simethicone Suspension 30 milliLiter(s) Oral every 4 hours PRN Dyspepsia  melatonin 3 milliGRAM(s) Oral at bedtime PRN Insomnia  ondansetron Injectable 4 milliGRAM(s) IV Push every 8 hours PRN Nausea and/or Vomiting      Allergies    No Known Allergies    Intolerances        FAMILY HISTORY:  Family history of heart disease (Father)    Family history of breast cancer in mother (Mother)    Family history of cervical cancer (Mother, Sibling)    FH: diabetes mellitus    Family hx of hypertension        Review of Systems    Constitutional, Eyes, ENT, Cardiovascular, Respiratory, Gastrointestinal, Genitourinary, Musculoskeletal, Integumentary, Neurological, Psychiatric, Endocrine, Heme/Lymph and Allergic/Immunologic review of systems are otherwise negative except as noted in HPI.     Vital Signs Last 24 Hrs  T(C): 36.8 (2022 07:19), Max: 38.3 (2022 20:28)  T(F): 98.2 (2022 07:19), Max: 101 (2022 20:28)  HR: 85 (2022 07:19) (85 - 110)  BP: 106/64 (2022 07:19) (101/54 - 123/72)  BP(mean): 69 (2022 20:28) (67 - 90)  RR: 17 (2022 07:19) (17 - 19)  SpO2: 93% (2022 07:19) (92% - 99%)    Parameters below as of 2022 07:19  Patient On (Oxygen Delivery Method): room air        Physical Exam  Eyes: PERRL, EOMI, no conjunctival infection, anicteric.   ENT: pharynx is unremarkable, moist mucus membrane, no oral lesions.   Neck: supple without JVD, no thyromegaly or masses appreciated.   Pulmonary: clear to auscultation bilaterally, no dullness, no wheezing.   Cardiac: RRR, normal S1S2, no murmurs, rubs, gallops.   Vascular: no JVD, no calf tenderness, venous stasis changes, varices.   Abdomen: normoactive bowel sounds, soft and nontender, no hepatosplenomegaly or masses appreciated.   Lymphatic: no peripheral adenopathy appreciated.   Musculoskeletal: full range of motion and no deformities appreciated.   Skin: normal appearance, no rash, nodules, vesicles, ulcers, erythema.   Neurology: grossly intact.   Psychiatric: affect appropriate.       LABS:  CBC Full  -  ( 2022 07:38 )  WBC Count : 6.94 K/uL  RBC Count : 1.95 M/uL  Hemoglobin : 7.2 g/dL  Hematocrit : 21.8 %  Platelet Count - Automated : 90 K/uL  Mean Cell Volume : 111.8 fl  Mean Cell Hemoglobin : 36.9 pg  Mean Cell Hemoglobin Concentration : 33.0 gm/dL  Auto Neutrophil # : 4.70 K/uL  Auto Lymphocyte # : 1.48 K/uL  Auto Monocyte # : 0.52 K/uL  Auto Eosinophil # : 0.17 K/uL  Auto Basophil # : 0.04 K/uL  Auto Neutrophil % : 67.8 %  Auto Lymphocyte % : 21.3 %  Auto Monocyte % : 7.5 %  Auto Eosinophil % : 2.4 %  Auto Basophil % : 0.6 %        138  |  104  |  14  ----------------------------<  104<H>  3.4<L>   |  27  |  0.50    Ca    8.9      2022 07:38  Phos  4.3       Mg     1.3         TPro  7.6  /  Alb  1.8<L>  /  TBili  5.6<H>  /  DBili  x   /  AST  143<H>  /  ALT  24  /  AlkPhos  111      PT/INR - ( 2022 14:09 )   PT: 20.1 sec;   INR: 1.72 ratio         PTT - ( 2022 14:09 )  PTT:37.5 sec  Urinalysis Basic - ( 2022 14:09 )    Color: Red / Appearance: Slightly Turbid / S.030 / pH: x  Gluc: x / Ketone: Trace  / Bili: Moderate / Urobili: 4   Blood: x / Protein: 30 mg/dL / Nitrite: Positive   Leuk Esterase: Trace / RBC: 0-2 /HPF / WBC 3-5   Sq Epi: x / Non Sq Epi: Few / Bacteria: Many        RADIOLOGY & ADDITIONAL STUDIES:    < from: CT Abdomen and Pelvis w/ IV Cont (22 @ 15:31) >  ACC: [id number] EXAM:  CT ANGIO CHEST PULM ART [facility]                        ACC: [id number] EXAM:  CT ABDOMEN AND PELVIS IC                          PROCEDURE DATE:  2022          INTERPRETATION:  CLINICAL INFORMATION: Shortness of breath, tachycardia,   history of BRCA gene. Rule out pneumonia or pulmonary embolism.    COMPARISON: None.    CONTRAST/COMPLICATIONS:  IV Contrast: Omnipaque 350 (accession [id number]), IV contrast documented   in associated exam (accession [id number])  90 cc administered   10 cc   discarded  Oral Contrast: Other (accession [id number]), NONE (accession [id number])  Complications: None reported at time of study completion    PROCEDURE:  CT of the Chest, Abdomen and Pelvis was performed.  Sagittal and coronal reformats were performed.    FINDINGS:  CHEST:  LUNGS AND LARGE AIRWAYS: Patent central airways. 5 mm right right lung   pulmonary nodule near the minor fissure, with morphology compatible with   a benign intrapulmonary lymph node.  PLEURA: Small left pleural effusion.  VESSELS: No pulmonary embolism. Normal caliber aorta.  HEART: Heart size is normal. No pericardial effusion.  MEDIASTINUM AND DAVID: No lymphadenopathy. Extensive esophageal and   paraesophageal varices noted.  CHEST WALL AND LOWER NECK: Status post bilateral mastectomy and breast   reconstructions.    ABDOMEN AND PELVIS:  LIVER: Morphologic changes compatible with advanced cirrhosis. No focal   liver lesion.  BILE DUCTS: Normal caliber.  GALLBLADDER: Cholelithiasis.  SPLEEN: Enlarged, measuring 15.6 cm.  PANCREAS: Within normal limits.  ADRENALS: Within normal limits.  KIDNEYS/URETERS: Within normal limits.    BLADDER: Within normal limits.  REPRODUCTIVE ORGANS: Intrauterine device within the uterus. Multiple   uterine fibroids. Unremarkable adnexa.    BOWEL: No bowel obstruction. Appendix is not identified.  PERITONEUM: Moderate volume ascites.  VESSELS: Patent portal veins. Esophageal and paraesophageal varices.   Gastrohepatic ligament and left upper quadrant varices with spontaneous   splenorenal shunt.  RETROPERITONEUM/LYMPH NODES: No lymphadenopathy.  ABDOMINAL WALL: Within normal limits.  BONES: Diffuse lytic change and areas of cortical destruction involving   the sternal manubrium and body.    IMPRESSION:  No pulmonary embolism.    Small left pleural effusion.    Cirrhosis, splenomegaly, portal hypertension and moderate ascites.    Esophageal varices.    Diffuse lytic change of the sternum suspicious for metastatic disease.            < end of copied text >

## 2022-11-29 NOTE — PROCEDURE NOTE - NSTIMEOUT_GEN_A_CORE
[address] [place] [address]  Authorization for Surgical Operation or Procedure  Date: ______________       Time: _______________  1.  I hereby authorize Dr. Hannah García my physician and the assistant, to perform the following operation and/o 5. I consent to the photographing of the operations or procedures to be performed for the purposes of advancing medicine, science, and/or education, provided my identity is not revealed.  If the procedure has been videotaped, the physician/surgeon will obta risks and benefits involved in the proposed treatment and any reasonable alternative to the proposed treatment. I have also explained the risks and benefits involved in the refusal of the proposed treatment and have answered the patient's questions.  If I h Patient's first and last name, , procedure, and correct site confirmed prior to the start of procedure.

## 2022-11-29 NOTE — PROGRESS NOTE ADULT - ASSESSMENT
#Moderate Ascites secondary to cirrhosis   - Noted to have abdominal distention and fluid shift on physical exam   - Imaging confirms ascites   - GI consult appreciated: will start Lasix 40 mg PO and spironolactone 100 mg PO   - IR consult appreciated: received diagnostic paracentesis on 11/29/22 - will need to follow-up on fluid analysis     #Esophageal varices/portal HTN   - Will start on propranolol 10 mg BID with target rest HR of 55 and systolic BP greater than 90   - GI consult appreciated: needs diagnostic EGD - may be able to arrange prior to d/c or timely outpatient     #Elevated Ammonia   - Upon admission 83   - Continue Lactulose     #Pleural effusion   - CT angio from 11/28/22: Small L sided pleural effusion   - Continue PO Lasix 40 mg   - Given her cancer history this could be malignant     #Diffuse lytic change of the sternum suspicious for metastatic disease  - Patient with history of breast cancer   - Heme/Once consult appreciated: will need biopsy   - IR consult appreciated: INR needs to be less than 1.5 before this can take place  #Moderate Ascites secondary to cirrhosis   - Noted to have abdominal distention and fluid shift on physical exam   - Imaging confirms ascites   - GI consult appreciated: will start Lasix 40 mg PO and spironolactone 100 mg PO   - IR consult appreciated: received diagnostic paracentesis on 11/29/22 - will need to follow-up on fluid analysis     #Esophageal varices/portal HTN   - Will start on propranolol 10 mg BID with target rest HR of 55 and systolic BP greater than 90   - GI consult appreciated: needs diagnostic EGD - may be able to arrange prior to d/c or timely outpatient     #Elevated Ammonia   - Upon admission 83   - Continue Lactulose 20 mg BID     #Pleural effusion   - CT angio from 11/28/22: Small L sided pleural effusion   - Continue PO Lasix 40 mg   - Given her cancer history this could be malignant     #Diffuse lytic change of the sternum suspicious for metastatic disease  - Patient with history of breast cancer   - Heme/Once consult appreciated: will need biopsy   - IR consult appreciated: INR needs to be less than 1.5 before this can take place     #Macrocytic anemia   - Heme/onc consult appreciated - anemia is likely multifactorial   - F/u work-up for hemolytic anemia given patient's elevated indirect hyperbilirubinemia     #Thrombocytopenia   - Upon admission platelets 100K, now 90K  - Continue to monitor     #DVT ppx   - Will hold off for now

## 2022-11-29 NOTE — CONSULT NOTE ADULT - ASSESSMENT
52 year old female with a PMH of Breast CA s/p mastectomy in 2018, COVID in 10/2022, Alcohol abuse disorder, History of Cocaine use (quit in 1999), presenting with symptoms of palpitations, shortness of breath, generalized weakness with abdominal distention. Lytic leson on CTA.    #Diffuse lytic change of sternum suspicious for metastatic disease  -CTA: Diffuse lytic change and areas of cortical destruction involving the sternal manubrium and body.  -given hx of breast cancer in 2018, this will need to be biopsied.    -consult IR for biopsy    #Macrocytic anemia  -likely multifactorial  s/t Vitamin B12 or folate deficiency/ ETOH liver disease  -r/o hemolytic anemia given hyperbilirubinemia with elevated indirect bili/ although normal Cr and LDH.  Check retic count, fibrinogen, haptoglobin (ordered)  -Check vit B12, folate, etoh level, urine tox, TSH, T3, T4, HIV (ordered)     #thrombocytopenia  -multifactorial including s/t splenomegaly. recent COVID infection 10/22 vs nutritional vs substance abuse - heavy daily ETOH/ vodka as recently as a few days prior  -11/28 = 100K --> 11/29 = 90K  -no prior labs to compare   -pt reports not having seen prior hematology   -cont to monitor    #cirrhosis with portal HTN with moderate volume ascites/ hyperbilirubinemia  -GI consult    #hx of breast cancer  -s/p b/l mastectomy in 2018  -not seen prior by oncology  52 year old female with a PMH of Breast CA s/p mastectomy in 2018, COVID in 10/2022, Alcohol abuse disorder, History of Cocaine use (quit in 1999), presenting with symptoms of palpitations, shortness of breath, generalized weakness with abdominal distention. Lytic lesion on CTA, macrocytic anemia and thrombocytopenia.    #Diffuse lytic change of sternum suspicious for metastatic disease  -CTA: Diffuse lytic change and areas of cortical destruction involving the sternal manubrium and body.  -given hx of breast cancer in 2018, this will need to be biopsied.    -consult IR for biopsy    #Macrocytic anemia  -likely multifactorial  s/t Vitamin B12 or folate deficiency/ ETOH liver disease  -r/o hemolytic anemia given hyperbilirubinemia with elevated indirect bili/ although normal Cr and LDH.  Check retic count, fibrinogen, haptoglobin (ordered)  -Check vit B12, folate, etoh level, urine tox, TSH, T3, T4, HIV (ordered)     #thrombocytopenia  -multifactorial including s/t splenomegaly. recent COVID infection 10/22 vs nutritional vs substance abuse - heavy daily ETOH/ vodka as recently as a few days prior  -11/28 = 100K --> 11/29 = 90K  -no prior labs to compare   -pt reports not having seen prior hematology   -cont to monitor    #cirrhosis with portal HTN with moderate volume ascites/ hyperbilirubinemia  -GI consult    #hx of breast cancer  -s/p b/l mastectomy in 2018  -not seen prior by oncology  52 year old female with a PMH of Breast CA s/p mastectomy in 2018, COVID in 10/2022, Alcohol abuse disorder, History of Cocaine use (quit in 1999), presenting with symptoms of palpitations, shortness of breath, generalized weakness with abdominal distention. Lytic lesion on CTA, macrocytic anemia and thrombocytopenia.    #Diffuse lytic change of sternum suspicious for metastatic disease  -CTA: Diffuse lytic change and areas of cortical destruction involving the sternal manubrium and body.  -given hx of breast cancer in 2018, this will need to be biopsied.    -consult IR bone biopsy  -diagnostic paracentesis r/o malignancy vs SBP/ fever in ED     #Macrocytic anemia  -likely multifactorial  s/t Vitamin B12 or folate deficiency/ ETOH liver disease  -r/o hemolytic anemia given hyperbilirubinemia with elevated indirect bili/ although normal Cr and LDH.  Check retic count, fibrinogen, haptoglobin (ordered)  -Check vit B12, folate, etoh level, urine tox, TSH, T3, T4, HIV (ordered)     #thrombocytopenia  -multifactorial including s/t splenomegaly. recent COVID infection 10/22 vs nutritional vs substance abuse - heavy daily ETOH/ vodka as recently as a few days prior  -11/28 = 100K --> 11/29 = 90K  -no prior labs to compare   -pt reports not having seen prior hematology   -cont to monitor    #cirrhosis with portal HTN with moderate volume ascites/ hyperbilirubinemia  -GI consult    #hx of breast cancer  -s/p b/l mastectomy in 2018  -not seen prior by oncology

## 2022-11-29 NOTE — CONSULT NOTE ADULT - REASON FOR ADMISSION
SOB, weight loss, abdominal distention, generalized weakness
SOB, weight loss, abdominal distention, generalized weakness

## 2022-11-29 NOTE — SBIRT NOTE ADULT - NSSBIRTUNABLESCROTHER_GEN_A_CORE
Patient endorsed substance use, attends AA meetings, no screening indicated. Declined additional resources, currently in therapeutic treatment.

## 2022-11-30 LAB
ALBUMIN SERPL ELPH-MCNC: 1.8 G/DL — LOW (ref 3.3–5)
ALP SERPL-CCNC: 107 U/L — SIGNIFICANT CHANGE UP (ref 40–120)
ALT FLD-CCNC: 28 U/L — SIGNIFICANT CHANGE UP (ref 12–78)
AMMONIA BLD-MCNC: 11 UMOL/L — SIGNIFICANT CHANGE UP (ref 11–32)
ANION GAP SERPL CALC-SCNC: 5 MMOL/L — SIGNIFICANT CHANGE UP (ref 5–17)
APTT BLD: 36.8 SEC — HIGH (ref 27.5–35.5)
AST SERPL-CCNC: 139 U/L — HIGH (ref 15–37)
B PERT IGG+IGM PNL SER: ABNORMAL
BILIRUB SERPL-MCNC: 4.6 MG/DL — HIGH (ref 0.2–1.2)
BUN SERPL-MCNC: 10 MG/DL — SIGNIFICANT CHANGE UP (ref 7–23)
CALCIUM SERPL-MCNC: 8.2 MG/DL — LOW (ref 8.5–10.1)
CHLORIDE SERPL-SCNC: 106 MMOL/L — SIGNIFICANT CHANGE UP (ref 96–108)
CO2 SERPL-SCNC: 25 MMOL/L — SIGNIFICANT CHANGE UP (ref 22–31)
COLOR FLD: YELLOW — SIGNIFICANT CHANGE UP
CREAT SERPL-MCNC: 0.55 MG/DL — SIGNIFICANT CHANGE UP (ref 0.5–1.3)
CULTURE RESULTS: NO GROWTH — SIGNIFICANT CHANGE UP
EGFR: 112 ML/MIN/1.73M2 — SIGNIFICANT CHANGE UP
FLUID INTAKE SUBSTANCE CLASS: SIGNIFICANT CHANGE UP
GLUCOSE SERPL-MCNC: 101 MG/DL — HIGH (ref 70–99)
HAPTOGLOB SERPL-MCNC: 22 MG/DL — LOW (ref 34–200)
HCT VFR BLD CALC: 22.8 % — LOW (ref 34.5–45)
HGB BLD-MCNC: 7.7 G/DL — LOW (ref 11.5–15.5)
INR BLD: 1.81 RATIO — HIGH (ref 0.88–1.16)
LYMPHOCYTES # FLD: 7 % — SIGNIFICANT CHANGE UP
MCHC RBC-ENTMCNC: 33.8 GM/DL — SIGNIFICANT CHANGE UP (ref 32–36)
MCHC RBC-ENTMCNC: 38.3 PG — HIGH (ref 27–34)
MCV RBC AUTO: 113.4 FL — HIGH (ref 80–100)
MELD SCORE WITH DIALYSIS: 32 POINTS — SIGNIFICANT CHANGE UP
MELD SCORE WITHOUT DIALYSIS: 20 POINTS — SIGNIFICANT CHANGE UP
MESOTHL CELL # FLD: 10 % — SIGNIFICANT CHANGE UP
MONOS+MACROS # FLD: 80 % — SIGNIFICANT CHANGE UP
NEUTROPHILS-BODY FLUID: 3 % — SIGNIFICANT CHANGE UP
PLATELET # BLD AUTO: 87 K/UL — LOW (ref 150–400)
POTASSIUM SERPL-MCNC: 5 MMOL/L — SIGNIFICANT CHANGE UP (ref 3.5–5.3)
POTASSIUM SERPL-SCNC: 5 MMOL/L — SIGNIFICANT CHANGE UP (ref 3.5–5.3)
PROT SERPL-MCNC: 7.4 GM/DL — SIGNIFICANT CHANGE UP (ref 6–8.3)
PROTHROM AB SERPL-ACNC: 21.1 SEC — HIGH (ref 10.5–13.4)
RBC # BLD: 2.01 M/UL — LOW (ref 3.8–5.2)
RBC # BLD: 2.01 M/UL — LOW (ref 3.8–5.2)
RBC # FLD: 15.9 % — HIGH (ref 10.3–14.5)
RCV VOL RI: 1000 /UL — HIGH (ref 0–0)
RETICS #: 105.7 K/UL — SIGNIFICANT CHANGE UP (ref 25–125)
RETICS/RBC NFR: 5.3 % — HIGH (ref 0.5–2.5)
SODIUM SERPL-SCNC: 136 MMOL/L — SIGNIFICANT CHANGE UP (ref 135–145)
SPECIMEN SOURCE: SIGNIFICANT CHANGE UP
TOTAL NUCLEATED CELL COUNT, BODY FLUID: 107 /UL — SIGNIFICANT CHANGE UP
TUBE TYPE: SIGNIFICANT CHANGE UP
WBC # BLD: 7.67 K/UL — SIGNIFICANT CHANGE UP (ref 3.8–10.5)
WBC # FLD AUTO: 7.67 K/UL — SIGNIFICANT CHANGE UP (ref 3.8–10.5)

## 2022-11-30 PROCEDURE — 99232 SBSQ HOSP IP/OBS MODERATE 35: CPT

## 2022-11-30 PROCEDURE — 99233 SBSQ HOSP IP/OBS HIGH 50: CPT | Mod: GC

## 2022-11-30 RX ORDER — SOD SULF/SODIUM/NAHCO3/KCL/PEG
2000 SOLUTION, RECONSTITUTED, ORAL ORAL ONCE
Refills: 0 | Status: COMPLETED | OUTPATIENT
Start: 2022-11-30 | End: 2022-11-30

## 2022-11-30 RX ADMIN — Medication 1 TABLET(S): at 09:19

## 2022-11-30 RX ADMIN — Medication 40 MILLIGRAM(S): at 09:19

## 2022-11-30 RX ADMIN — Medication 2000 MILLILITER(S): at 16:29

## 2022-11-30 RX ADMIN — SPIRONOLACTONE 100 MILLIGRAM(S): 25 TABLET, FILM COATED ORAL at 12:43

## 2022-11-30 RX ADMIN — Medication 100 MILLIGRAM(S): at 09:19

## 2022-11-30 RX ADMIN — Medication 1 MILLIGRAM(S): at 09:19

## 2022-11-30 RX ADMIN — Medication 40 MILLIEQUIVALENT(S): at 02:14

## 2022-11-30 NOTE — PROGRESS NOTE ADULT - ATTENDING COMMENTS
51 y/o F with a PMH of breast CA s/p mastectomy in 2018, COVID-19 infection in 10/2022, alcohol abuse disorder (actively participating in AA with sponsor), history of Cocaine use (quit in 1999), and former smoker (quit in 2015) who presented to the ED on 11/28/22 with presenting with shortness of breath and abdominal distention.    #Moderate Ascites secondary to cirrhosis   - Noted to have abdominal distention and fluid shift on physical exam   - Imaging confirms ascites   - GI consult appreciated: continue Lasix 40 mg PO and spironolactone 100 mg PO   - IR consult appreciated: received diagnostic paracentesis on 11/29/22 - tap is consistent with portal HTN and no evidence of SBP
53 y/o F with a PMH of breast CA s/p mastectomy in 2018, COVID-19 infection in 10/2022, alcohol abuse disorder (actively participating in AA with sponsor), history of Cocaine use (quit in 1999), and former smoker (quit in 2015) who presented to the ED on 11/28/22 with presenting with shortness of breath and abdominal distention.    #Moderate Ascites secondary to cirrhosis   - Noted to have abdominal distention and fluid shift on physical exam   - Imaging confirms ascites   - GI consult appreciated: will start Lasix 40 mg PO and spironolactone 100 mg PO   - IR consult appreciated: received diagnostic paracentesis on 11/29/22 - will need to follow-up on fluid analysis

## 2022-11-30 NOTE — PROGRESS NOTE ADULT - ASSESSMENT
Imp:  cirrhosis with portal HTN  anemia  Ascites -- tap is c/w portal HTN, and no evidence of SBP    Plan:  Stop propranalol - best to avoid until ascites is better controlled  Stop lactulose -- she's not encephalopathic  Plan EGD and colonoscopy tomorrow for anemia and plan for variceal banding  Risks and benefits reviewed, patient agreeable

## 2022-11-30 NOTE — PROGRESS NOTE ADULT - ASSESSMENT
#Moderate Ascites secondary to cirrhosis   - Noted to have abdominal distention and fluid shift on physical exam   - Imaging confirms ascites   - GI consult appreciated: continue Lasix 40 mg PO and spironolactone 100 mg PO   - IR consult appreciated: received diagnostic paracentesis on 11/29/22 - tap is consistent with portal HTN and no evidence of SBP    #Esophageal varices/portal HTN   - Consider starting propranolol as per GI will hold off for now   - GI consult appreciated: Plan for EGD/colonoscopy on 12/1/22    #Elevated Ammonia   - Now improved   - Patient not encephalopathic     #Pleural effusion   - CT angio from 11/28/22: Small L sided pleural effusion   - Continue PO Lasix 40 mg     #Diffuse lytic change of the sternum suspicious for metastatic disease  - Patient with history of breast cancer   - Heme/Once consult appreciated: will need biopsy   - IR consult appreciated: INR needs to be less than 1.5 before this can take place - could be done outpatient     #Macrocytic anemia   - Heme/onc consult appreciated - anemia is likely multifactorial   - F/u work-up for hemolytic anemia given patient's elevated indirect hyperbilirubinemia   - Patient will have EGD/colonoscopy on 12/1/22    #Thrombocytopenia   - Upon admission platelets 100K, now 87K  - Continue to monitor     #DVT ppx   - Will hold off for now

## 2022-12-01 ENCOUNTER — RESULT REVIEW (OUTPATIENT)
Age: 52
End: 2022-12-01

## 2022-12-01 ENCOUNTER — TRANSCRIPTION ENCOUNTER (OUTPATIENT)
Age: 52
End: 2022-12-01

## 2022-12-01 VITALS — WEIGHT: 134.92 LBS

## 2022-12-01 LAB
ANION GAP SERPL CALC-SCNC: 5 MMOL/L — SIGNIFICANT CHANGE UP (ref 5–17)
BUN SERPL-MCNC: 8 MG/DL — SIGNIFICANT CHANGE UP (ref 7–23)
CALCIUM SERPL-MCNC: 9.1 MG/DL — SIGNIFICANT CHANGE UP (ref 8.5–10.1)
CHLORIDE SERPL-SCNC: 103 MMOL/L — SIGNIFICANT CHANGE UP (ref 96–108)
CO2 SERPL-SCNC: 25 MMOL/L — SIGNIFICANT CHANGE UP (ref 22–31)
COPPER SERPL-MCNC: 89 UG/DL — SIGNIFICANT CHANGE UP (ref 80–158)
CREAT SERPL-MCNC: 0.59 MG/DL — SIGNIFICANT CHANGE UP (ref 0.5–1.3)
DIR ANTIGLOB POLYSPECIFIC INTERPRETATION: SIGNIFICANT CHANGE UP
EGFR: 110 ML/MIN/1.73M2 — SIGNIFICANT CHANGE UP
GLUCOSE SERPL-MCNC: 101 MG/DL — HIGH (ref 70–99)
HAV IGG SER QL IA: SIGNIFICANT CHANGE UP
HBV CORE AB SER-ACNC: SIGNIFICANT CHANGE UP
HBV SURFACE AB SER-ACNC: REACTIVE
HCG UR QL: NEGATIVE — SIGNIFICANT CHANGE UP
HCT VFR BLD CALC: 23.4 % — LOW (ref 34.5–45)
HGB BLD-MCNC: 7.9 G/DL — LOW (ref 11.5–15.5)
LDH SERPL L TO P-CCNC: 217 U/L — SIGNIFICANT CHANGE UP (ref 84–241)
MCHC RBC-ENTMCNC: 33.8 GM/DL — SIGNIFICANT CHANGE UP (ref 32–36)
MCHC RBC-ENTMCNC: 37.8 PG — HIGH (ref 27–34)
MCV RBC AUTO: 112 FL — HIGH (ref 80–100)
PLATELET # BLD AUTO: 106 K/UL — LOW (ref 150–400)
POTASSIUM SERPL-MCNC: 4.7 MMOL/L — SIGNIFICANT CHANGE UP (ref 3.5–5.3)
POTASSIUM SERPL-SCNC: 4.7 MMOL/L — SIGNIFICANT CHANGE UP (ref 3.5–5.3)
RBC # BLD: 2.09 M/UL — LOW (ref 3.8–5.2)
RBC # FLD: 15.9 % — HIGH (ref 10.3–14.5)
SODIUM SERPL-SCNC: 133 MMOL/L — LOW (ref 135–145)
WBC # BLD: 7.42 K/UL — SIGNIFICANT CHANGE UP (ref 3.8–10.5)
WBC # FLD AUTO: 7.42 K/UL — SIGNIFICANT CHANGE UP (ref 3.8–10.5)

## 2022-12-01 PROCEDURE — 99239 HOSP IP/OBS DSCHRG MGMT >30: CPT | Mod: GC

## 2022-12-01 PROCEDURE — 88305 TISSUE EXAM BY PATHOLOGIST: CPT | Mod: 26

## 2022-12-01 PROCEDURE — 88342 IMHCHEM/IMCYTCHM 1ST ANTB: CPT | Mod: 26

## 2022-12-01 PROCEDURE — 88312 SPECIAL STAINS GROUP 1: CPT | Mod: 26

## 2022-12-01 RX ORDER — FUROSEMIDE 40 MG
1 TABLET ORAL
Qty: 30 | Refills: 0
Start: 2022-12-01 | End: 2022-12-30

## 2022-12-01 RX ORDER — SPIRONOLACTONE 25 MG/1
4 TABLET, FILM COATED ORAL
Qty: 120 | Refills: 0
Start: 2022-12-01 | End: 2022-12-30

## 2022-12-01 RX ORDER — POTASSIUM CHLORIDE 20 MEQ
40 PACKET (EA) ORAL EVERY 4 HOURS
Refills: 0 | Status: DISCONTINUED | OUTPATIENT
Start: 2022-12-01 | End: 2022-12-01

## 2022-12-01 RX ORDER — THIAMINE MONONITRATE (VIT B1) 100 MG
1 TABLET ORAL
Qty: 30 | Refills: 0
Start: 2022-12-01 | End: 2022-12-30

## 2022-12-01 RX ORDER — FOLIC ACID 0.8 MG
1 TABLET ORAL
Qty: 30 | Refills: 0
Start: 2022-12-01 | End: 2022-12-30

## 2022-12-01 NOTE — DISCHARGE NOTE PROVIDER - PROVIDER TOKENS
PROVIDER:[TOKEN:[[id number]],FOLLOWUP:[1 week],ESTABLISHEDPATIENT:[T]],PROVIDER:[TOKEN:[[id number]],FOLLOWUP:[1 week],ESTABLISHEDPATIENT:[T]],PROVIDER:[TOKEN:[[id number]],FOLLOWUP:[1 week],ESTABLISHEDPATIENT:[T]],PROVIDER:[TOKEN:[[id number]],FOLLOWUP:[1 week],ESTABLISHEDPATIENT:[T]]

## 2022-12-01 NOTE — DISCHARGE NOTE PROVIDER - NSDCMRMEDTOKEN_GEN_ALL_CORE_FT
Multiple Vitamins oral tablet: 1 tab(s) orally once a day   folic acid 1 mg oral tablet: 1 tab(s) orally once a day  furosemide 40 mg oral tablet: 1 tab(s) orally once a day  Multiple Vitamins oral tablet: 1 tab(s) orally once a day  spironolactone 25 mg oral tablet: 4 tab(s) orally once a day  thiamine 100 mg oral tablet: 1 tab(s) orally once a day

## 2022-12-01 NOTE — DISCHARGE NOTE PROVIDER - CARE PROVIDER_API CALL
Damion Joseph)  Gastroenterology; Internal Medicine  [address], [address]  [place] [address]  Phone: [phone]  Fax: [phone]  Established Patient  Follow Up Time: 1 week    Funmilayo Mendoza)  Medical Oncology  [address], [place] [address]  Phone: [phone]  Fax: [phone]  Established Patient  Follow Up Time: 1 week    Halaibeh, Mohammad A (MD)  Radiology General  [address] [place] [address]  Phone: [phone]  Fax: [phone]  Established Patient  Follow Up Time: 1 week    Josie Klein (NP; RN)  NP in Adult Health  [address] [place] [address], [address] [place] [address]  Phone: [phone]  Fax: [phone]  Established Patient  Follow Up Time: 1 week

## 2022-12-01 NOTE — DIETITIAN INITIAL EVALUATION ADULT - OTHER INFO
52 year old female with a PMH of Breast CA s/p mastectomy in 2018, COVID in 10/2022, Alcohol abuse disorder (actively participating in AA with sponsor), History of Cocaine use (quit in 1999), Former smoker (quit in 2015) presenting with symptoms of palpitations, shortness of breath, generalized weakness with abdominal distention. Adm for ascites.    Pt has been NPO for procedures until today, ate half turkey sand, fruit. Reports UBW: 155# 6 mos ago.  [facility] NFPE reveals moderate/severe muscle/fat wasting. Pt on DASH diet, rec Liberalize diet to Regular to optimize po/nutrient intake. Will add suppls, pt willing.

## 2022-12-01 NOTE — DISCHARGE NOTE PROVIDER - HOSPITAL COURSE
Patient is a 53 y/o F with a PMH of breast CA s/p mastectomy in 2018, COVID-19 infection in 10/2022, alcohol abuse disorder (actively participating in AA with sponsor), history of Cocaine use (quit in 1999), and former smoker (quit in 2015) who presented to the  ED on 11/28/22 with presenting with shortness of breath, abdominal distention, and 16 lbs of weight loss. No prior hospitalizations for alcohol use, No prior treatment for alcohol use, no history of seizures/delirium tremens. In the ED VSS except for reported fever of 101 in ED. Patient was given motrin. Satting 97%. Hgb 8.6 (unknown baseline), Plt 100, INR 1.72, K 3.3, Bilirubin 8.1, , , Mg 1.3, . UA +moderate bilirubin, proteinuria, bacteruria, nitrites, trace blood and trace LE. Negative for COVID, RSV, Influenza. CXR: Blunting of left costophrenic angle, suspect L pleural effusion with rounded opacity in left lung. CT A/P: +cirrhosis with portal HTN with moderate volume ascites. esophageal varices. + diffuse lytic change of sternum suspicious for metastatic disease. CTA Chest: negative for PE. + small L pleural effusion. Pt was started on Lasix 40mg PO and spironolactone 100mg PO for management of ascites. Pt was then admitted for further management. GI was consulted. Diagnostic paracentesis by the IR was done 11/29/22. The tap was consistent with portal HTN and no evidence of SBP. SAAG 1.4g/dL. EGD showed trace esophageal varices, portal gastropathy, gastric polyps. Colonoscopy showed internal hermmorhoids. Hem/onc recommended for Biopsy of sternum, considering her hx of breast ca, but was not done due to INR not meeting below 1.5 per IR. Pt to be followed up with Heme/onc after discharge for further management of lytic changes of sternum.     12/1/22  Pt in bed when seen and examined. Pt with no new complaints and is medically stable to be discharged. Denies HA, SOB, CP, ABD pain, f/c, and n/v.     T(C): 37.1 (12-01-22 @ 15:28), Max: 37.1 (12-01-22 @ 15:28)  T(F): 98.8 (12-01-22 @ 15:28), Max: 98.8 (12-01-22 @ 15:28)  HR: 77 (12-01-22 @ 15:28) (72 - 77)  BP: 113/58 (12-01-22 @ 15:28) (100/58 - 113/58)  RR: 18 (12-01-22 @ 15:28) (17 - 18)  SpO2: 97% (12-01-22 @ 15:28) (97% - 100%)    Physical Exam:  General: in bed; appears less jaundiced   Respiratory: CTAB  Cardiovascular: S1 and S2   Gastrointestinal: mildly distended, no pain to touch, no rebound   Psych: alert and oriented x 3   Skin: No rashes

## 2022-12-01 NOTE — DISCHARGE NOTE PROVIDER - NSDCCPCAREPLAN_GEN_ALL_CORE_FT
PRINCIPAL DISCHARGE DIAGNOSIS  Diagnosis: Ascites  Assessment and Plan of Treatment: During this hospitalization you were found to have ascities which was drained with a procedure called paracentesis. The drainage was consistent with portal hypertension with no evidence of spontaneous bacterial peritonitis.   Please continue taking Lasix and spironolactone as directed. Follow up with Dr. Joseph within a week for further management.      SECONDARY DISCHARGE DIAGNOSES  Diagnosis: Portal hypertension  Assessment and Plan of Treatment: Follow up with Dr. Joseph within a week for further management.    Diagnosis: Esophageal varices in cirrhosis  Assessment and Plan of Treatment: Follow up with Dr. Joseph within a week for further management.    Diagnosis: Cirrhosis  Assessment and Plan of Treatment: Follow up with Dr. Joseph within a week for further management.    Diagnosis: Large pleural effusion  Assessment and Plan of Treatment: CT scan during this hositalization showed small pleural effusion around the left lung. Please follow up with Dr. Mendoza for further management.    Diagnosis: Finding of sternum structure  Assessment and Plan of Treatment: There is diffuse lytic changes of the sternum that was found on a CT scan. Please follow up with Dr. Mendoza for further management.    Diagnosis: Macrocytic anemia  Assessment and Plan of Treatment: Your anemia is likely from mutifactorial causes. Please follow up with Dr. Mendoza for further managment.

## 2022-12-01 NOTE — DIETITIAN INITIAL EVALUATION ADULT - ADD RECOMMEND
1. Liberalize diet to Regular to optimize po/nutrient intake.   2. Add Ensure Plus Hi Pro BID to help pt optimize ENN po and pro intake.   3. Consider adding thiamine 100 mg daily 2/2 poor PO intake/ malnutrition and MVI w/ minerals and folate daily to ensure 100% RDA met   4. Monitor bowel movements, if no BM for >3 days, consider implementing bowel regimen.   5. RDN will continue to monitor PO intake, labs, hydration, and wt prn.

## 2022-12-01 NOTE — DISCHARGE NOTE NURSING/CASE MANAGEMENT/SOCIAL WORK - PATIENT PORTAL LINK FT
You can access the FollowMyHealth Patient Portal offered by [facility] by registering at the following website: [email or web address]. By joining [facility]’s FollowMyHealth portal, you will also be able to view your health information using other applications (apps) compatible with our system.

## 2022-12-01 NOTE — DISCHARGE NOTE PROVIDER - CARE PROVIDERS DIRECT ADDRESSES
,DirectAddress_Unknown,[email or web address],DirectAddress_Unknown,[email or web address]

## 2022-12-01 NOTE — PROGRESS NOTE ADULT - REASON FOR ADMISSION
SOB, weight loss, abdominal distention, generalized weakness

## 2022-12-01 NOTE — PROGRESS NOTE ADULT - SUBJECTIVE AND OBJECTIVE BOX
EGD:  Trace esoph varices  Portal gastropathy  Gastric polyp  O/W normal    Colon  Internal hemorrhoids  O/W normal    Rec:  Cont lasix and aldactone  D/C plan
Patient is a 52y old  Female who presents with a chief complaint of SOB, weight loss, abdominal distention, generalized weakness (2022 18:29)      Subective:  Feels good today, no complaints  Has made life adjustments to help with no further alcohol    PAST MEDICAL & SURGICAL HISTORY:  Chronic alcohol abuse      History of cocaine use  quit in       Former smoker  quit in       History of bilateral mastectomy      History of           MEDICATIONS  (STANDING):  folic acid 1 milliGRAM(s) Oral daily  furosemide    Tablet 40 milliGRAM(s) Oral daily  influenza   Vaccine 0.5 milliLiter(s) IntraMuscular once  lactulose Syrup 20 Gram(s) Oral two times a day  multivitamin 1 Tablet(s) Oral daily  propranolol 10 milliGRAM(s) Oral two times a day  spironolactone 100 milliGRAM(s) Oral daily  thiamine 100 milliGRAM(s) Oral daily    MEDICATIONS  (PRN):  aluminum hydroxide/magnesium hydroxide/simethicone Suspension 30 milliLiter(s) Oral every 4 hours PRN Dyspepsia  melatonin 3 milliGRAM(s) Oral at bedtime PRN Insomnia  ondansetron Injectable 4 milliGRAM(s) IV Push every 8 hours PRN Nausea and/or Vomiting      REVIEW OF SYSTEMS:    RESPIRATORY: No shortness of breath  CARDIOVASCULAR: No chest pain  All other review of systems is negative unless indicated above.    Vital Signs Last 24 Hrs  T(C): 37.2 (2022 09:28), Max: 37.5 (2022 16:00)  T(F): 99 (2022 09:28), Max: 99.5 (2022 16:00)  HR: 86 (2022 09:28) (85 - 96)  BP: 109/53 (2022 09:28) (109/53 - 117/70)  BP(mean): --  RR: 17 (2022 09:28) (17 - 18)  SpO2: 96% (2022 09:28) (94% - 96%)    Parameters below as of 2022 09:28  Patient On (Oxygen Delivery Method): room air        PHYSICAL EXAM:    Constitutional: NAD, well-developed, jaundice  Respiratory: CTAB  Cardiovascular: S1 and S2, RRR  Gastrointestinal: BS+, soft, NT/ND  Extremities: No peripheral edema  Psychiatric: Normal mood, normal affect    LABS:                        7.7    7.67  )-----------( 87       ( 2022 06:27 )             22.8         136  |  106  |  10  ----------------------------<  101<H>  5.0   |  25  |  0.55    Ca    8.2<L>      2022 06:27  Phos  4.3       Mg     1.3         TPro  7.4  /  Alb  1.8<L>  /  TBili  4.6<H>  /  DBili  2.2<H>  /  AST  139<H>  /  ALT  28  /  AlkPhos  107      PT/INR - ( 2022 06:27 )   PT: 21.1 sec;   INR: 1.81 ratio         PTT - ( 2022 06:27 )  PTT:36.8 sec  LIVER FUNCTIONS - ( 2022 06:27 )  Alb: 1.8 g/dL / Pro: 7.4 gm/dL / ALK PHOS: 107 U/L / ALT: 28 U/L / AST: 139 U/L / GGT: x             RADIOLOGY & ADDITIONAL STUDIES:
Pt has been seen and examined with FP resident, resident supervised agree with a/p       Patient is a 52y old  Female who presents with a chief complaint of SOB, weight loss, abdominal distention, generalized weakness (30 Nov 2022 20:42)        PHYSICAL EXAM:  Vital Signs Last 24 Hrs  T(C): 36.7 (01 Dec 2022 08:47), Max: 37.1 (30 Nov 2022 15:15)  T(F): 98.1 (01 Dec 2022 08:47), Max: 98.7 (30 Nov 2022 15:15)  HR: 72 (01 Dec 2022 08:47) (72 - 81)  BP: 102/61 (01 Dec 2022 08:47) (100/58 - 105/49)  BP(mean): --  RR: 17 (01 Dec 2022 08:47) (17 - 18)  SpO2: 100% (01 Dec 2022 08:47) (95% - 100%)    Parameters below as of 01 Dec 2022 08:47  Patient On (Oxygen Delivery Method): room air      -rs-aeeb, cta  -cvs-s1s2 normal   -p/a-soft,bs+      A/P    #d/c after EGD, colonoscopy and cleared by GI     #time spent 45 minutes     #Counselled not to drink and use any illicit drugs, all questions have been answered 
HPI:  52 year old female with a PMH of Breast CA s/p mastectomy in 2018, COVID in 10/2022, Alcohol abuse disorder (actively participating in AA with sponsor), History of Cocaine use (quit in ), Former smoker (quit in ) presenting with symptoms of palpitations, shortness of breath, generalized weakness with abdominal distention.     Patient also has complaints of 16 lb weight loss in about the span of a year. Patient reports difficulty breathing while at home with generalized weakness when attempting to get out of bed. She checked her oxygen level with a pulse ox at home and was sating at 91-92% on RA, which prompted her admission to the hospital for suspected residual COVID symptoms. Patient has extensive personal and family history of alcohol use. Last alcoholic drink was on 22. Reports increase in usage since  when COVID started, with reported daily alcohol usage, mostly vodka. No prior hospitalizations for alcohol use, No prior treatment for alcohol use, no history of seizures/delirium tremens. Patient states that use of alcohol and recreational drug use runs in her family. All of her family members are alcoholics. Patient reports that biological mother is a drug dealer and biological father is a drug addict, as well as siblings, nephews and nieces. Denies chest pain, nausea, vomiting, urinary symptoms, diarrhea or constipation. Last bowel movement was this morning. Patient does not have a PCP. Patient has not gotten a colonoscopy. Denies recent travel or extensive travel time. Reports use of compression socks with long car rides.     In the ED VSS except for reported fever of 101 in ED. Patient was given motrin. Satting at 97%. Hgb 8.6 (unknown baseline), Plt 100, INR 1.72, K 3.3, Bilirubin 8.1, , , Mg 1.3, . UA +moderate bilirubin, proteinuria, bacteruria, nitrites, trace blood and trace LE. Negative for COVID, RSV, Influenza. CXR: Blunting of left costophrenic angle, suspect L pleural effusion with rounded opacity in left lung. CT A/P: +cirrhosis with portal HTN with moderate volume ascites. + diffuse lytic change of sternum suspicious for metastatic disease. CTA Chest: negative for PE. + small L pleural effusion.  (2022 20:56)    2022- Awake, alert, in no acute distress, denies any acute pain. Inquires about the plans of Bone Bx and EGD.    PAST MEDICAL & SURGICAL HISTORY:  Chronic alcohol abuse  History of cocaine use-quit in   Former smoker-quit in   History of bilateral mastectomy-2018  History of     MEDICATIONS  (STANDING):  folic acid 1 milliGRAM(s) Oral daily  influenza Vaccine 0.5 milliLiter(s) IntraMuscular once  lactulose Syrup 20 Gram(s) Oral two times a day  multivitamin 1 Tablet(s) Oral daily  thiamine 100 milliGRAM(s) Oral daily    MEDICATIONS  (PRN):  aluminum hydroxide/magnesium hydroxide/simethicone Suspension 30 milliLiter(s) Oral every 4 hours PRN Dyspepsia  melatonin 3 milliGRAM(s) Oral at bedtime PRN Insomnia  ondansetron Injectable 4 milliGRAM(s) IV Push every 8 hours PRN Nausea and/or Vomiting    Allergies  No Known Allergies    FAMILY HISTORY:  heart disease (Father)  breast cancer in mother (Mother)  cervical cancer (Mother, Sibling)  diabetes mellitus  hypertension    Review of Systems  Constitutional, Eyes, ENT, Cardiovascular, Respiratory, Gastrointestinal, Genitourinary, Musculoskeletal, Integumentary, Neurological, Psychiatric, Endocrine, Heme/Lymph and Allergic/Immunologic review of systems are otherwise negative except as noted in HPI.     Vital Signs Last 24 Hrs  T(C): 37.2 (2022 09:28), Max: 37.5 (2022 16:00)  T(F): 99 (2022 09:28), Max: 99.5 (2022 16:00)  HR: 78 (2022 12:46) (78 - 96)  BP: 103/52 (2022 12:46) (103/52 - 117/70)  BP(mean): --  RR: 17 (2022 09:28) (17 - 18)  SpO2: 96% (2022 09:28) (94% - 96%)    Parameters below as of 2022 09:28  Patient On (Oxygen Delivery Method): room air    Physical Exam  Eyes: icteric.   ENT: pharynx is unremarkable, moist mucus membrane, no oral lesions.   Neck: supple without JVD, no thyromegaly or masses appreciated.   Pulmonary: clear to auscultation bilaterally, no dullness, no wheezing.   Cardiac: RRR, normal S1S2, no murmurs, rubs, gallops.   Vascular: no JVD, no calf tenderness, venous stasis changes, varices.   Abdomen: normoactive bowel sounds, soft and nontender, no hepatosplenomegaly or masses appreciated.   Lymphatic: no peripheral adenopathy appreciated.   Musculoskeletal: full range of motion and no deformities appreciated.   Skin: normal appearance, no rash, nodules, vesicles, ulcers, erythema.   Neurology: grossly intact.   Psychiatric: affect appropriate.       LABS:                        7.7    7.67  )-----------( 87       ( 2022 06:27 )             22.8       136  |  106  |  10  ----------------------------<  101<H>  5.0   |  25  |  0.55    Ca    8.2<L>      2022 06:27  Phos  4.3     11-  Mg     1.3     -    TPro  7.4  /  Alb  1.8<L>  /  TBili  4.6<H>  /  DBili  2.2<H>  /  AST  139<H>  /  ALT  28  /  AlkPhos  107  11-30    PT/INR - ( 2022 06:27 )   PT: 21.1 sec;   INR: 1.81 ratio    PTT - ( 2022 06:27 )  PTT:36.8 sec    Urinalysis Basic - ( 2022 14:09 )    Color: Red / Appearance: Slightly Turbid / S.030 / pH: x  Gluc: x / Ketone: Trace  / Bili: Moderate / Urobili: 4   Blood: x / Protein: 30 mg/dL / Nitrite: Positive   Leuk Esterase: Trace / RBC: 0-2 /HPF / WBC 3-5   Sq Epi: x / Non Sq Epi: Few / Bacteria: Many    RADIOLOGY:    CT Abdomen and Pelvis w/ IV Cont                           PROCEDURE DATE:  2022    INTERPRETATION:  CLINICAL INFORMATION: Shortness of breath, tachycardia,   history of BRCA gene. Rule out pneumonia or pulmonary embolism.    COMPARISON: None.    CONTRAST/COMPLICATIONS:  IV Contrast: Omnipaque 350 (accession [id number]), IV contrast documented   in associated exam (accession [id number])  90 cc administered   10 cc   discarded  Oral Contrast: Other (accession [id number]), NONE (accession [id number])  Complications: None reported at time of study completion    PROCEDURE:  CT of the Chest, Abdomen and Pelvis was performed.  Sagittal and coronal reformats were performed.    FINDINGS:  CHEST:  LUNGS AND LARGE AIRWAYS: Patent central airways. 5 mm right right lung   pulmonary nodule near the minor fissure, with morphology compatible with   a benign intrapulmonary lymph node.  PLEURA: Small left pleural effusion.  VESSELS: No pulmonary embolism. Normal caliber aorta.  HEART: Heart size is normal. No pericardial effusion.  MEDIASTINUM AND DAVID: No lymphadenopathy. Extensive esophageal and   paraesophageal varices noted.  CHEST WALL AND LOWER NECK: Status post bilateral mastectomy and breast   reconstructions.    ABDOMEN AND PELVIS:  LIVER: Morphologic changes compatible with advanced cirrhosis. No focal   liver lesion.  BILE DUCTS: Normal caliber.  GALLBLADDER: Cholelithiasis.  SPLEEN: Enlarged, measuring 15.6 cm.  PANCREAS: Within normal limits.  ADRENALS: Within normal limits.  KIDNEYS/URETERS: Within normal limits.    BLADDER: Within normal limits.  REPRODUCTIVE ORGANS: Intrauterine device within the uterus. Multiple   uterine fibroids. Unremarkable adnexa.    BOWEL: No bowel obstruction. Appendix is not identified.  PERITONEUM: Moderate volume ascites.  VESSELS: Patent portal veins. Esophageal and paraesophageal varices.   Gastrohepatic ligament and left upper quadrant varices with spontaneous   splenorenal shunt.  RETROPERITONEUM/LYMPH NODES: No lymphadenopathy.  ABDOMINAL WALL: Within normal limits.  BONES: Diffuse lytic change and areas of cortical destruction involving   the sternal manubrium and body.    IMPRESSION:  No pulmonary embolism.  Small left pleural effusion.  Cirrhosis, splenomegaly, portal hypertension and moderate ascites.  Esophageal varices.  Diffuse lytic change of the sternum suspicious for metastatic disease.    
S:  Patient is a 53 y/o F with a PMH of breast CA s/p mastectomy in 2018, COVID-19 infection in 10/2022, alcohol abuse disorder (actively participating in AA with sponsor), history of Cocaine use (quit in 1999), and former smoker (quit in 2015) who presented to the ED on 11/28/22 with presenting with shortness of breath and abdominal distention. She checked her pulse ox at home and was satting at 91-92% on RA, which prompted her admission to the hospital as she suspected residual COVID symptoms. Patient also mentioned 16 lb weight loss in about the span of a year. Of note, patient has extensive personal and family history of alcohol use. Last alcoholic drink was on 11/24/22. Reports increase in usage since 2020 when COVID started, with reported daily alcohol usage, mostly vodka. No prior hospitalizations for alcohol use, No prior treatment for alcohol use, no history of seizures/delirium tremens.     11/29/22: Today is hospital day 1. Patient seen and examined with  present at bedside. All questions answered. Explained the need for diagnostic paracentesis.     O:  Vital Signs Last 24 Hrs  T(C): 37.5 (29 Nov 2022 16:00), Max: 38.3 (28 Nov 2022 20:28)  T(F): 99.5 (29 Nov 2022 16:00), Max: 101 (28 Nov 2022 20:28)  HR: 96 (29 Nov 2022 16:00) (93 - 99)  BP: 113/64 (29 Nov 2022 16:00) (101/54 - 119/62)  BP(mean): 69 (28 Nov 2022 20:28) (69 - 69)  RR: 17 (29 Nov 2022 16:00) (17 - 19)  SpO2: 95% (29 Nov 2022 16:00) (92% - 99%)    Parameters below as of 29 Nov 2022 16:00  Patient On (Oxygen Delivery Method): room air    Physical Exam:  General: sitting in bed; appears jaundiced   Respiratory: CTAB  Cardiovascular: S1 and S2   Gastrointestinal: moderate ascites   Psych: alert and oriented x 3   Skin: No rashes      LABS:  11-28 @ 14:09 Creatine 34 U/L [26 - 192]  cret                        7.2    6.94  )-----------( 90       ( 29 Nov 2022 07:38 )             21.8     11-29    138  |  104  |  14  ----------------------------<  104<H>  3.4<L>   |  27  |  0.50    Ca    8.9      29 Nov 2022 07:38  Phos  4.3     11-28  Mg     1.3     11-28    TPro  7.6  /  Alb  1.8<L>  /  TBili  5.6<H>  /  DBili  x   /  AST  143<H>  /  ALT  24  /  AlkPhos  111  11-29    PT/INR - ( 28 Nov 2022 14:09 )   PT: 20.1 sec;   INR: 1.72 ratio         PTT - ( 28 Nov 2022 14:09 )  PTT:37.5 sec      IMAGING:  < from: Xray Chest 1 View- PORTABLE-Urgent (Xray Chest 1 View- PORTABLE-Urgent .) (11.28.22 @ 14:24) >  FINDINGS: Heart size appears within normal limits. No hilar or superior   mediastinal abnormalities are identified. Blunting of the left   costophrenic angle sulcus suggests the presence of left pleural effusion.   Somewhat rounded opacity within the left lower lung field may represent   atelectasis versus underlying parenchymal abnormality. No evidence for   acute right-sided infiltrate. No right pleural effusion. No pneumothorax.   No mediastinal shift. No acute osseous fractures.    IMPRESSION: As above.  < end of copied text >      < from: CT Angio Chest PE Protocol w/ IV Cont (11.28.22 @ 15:30) >  IMPRESSION:  No pulmonary embolism.    Small left pleural effusion.    Cirrhosis, splenomegaly, portal hypertension and moderate ascites.    Esophageal varices.    Diffuse lytic change of the sternum suspicious for metastatic disease.  < end of copied text >    MEDICATIONS  (STANDING):  folic acid 1 milliGRAM(s) Oral daily  furosemide    Tablet 40 milliGRAM(s) Oral daily  influenza   Vaccine 0.5 milliLiter(s) IntraMuscular once  lactulose Syrup 20 Gram(s) Oral two times a day  multivitamin 1 Tablet(s) Oral daily  potassium chloride    Tablet ER 40 milliEquivalent(s) Oral every 4 hours  potassium chloride    Tablet ER 40 milliEquivalent(s) Oral every 4 hours  spironolactone 100 milliGRAM(s) Oral daily  thiamine 100 milliGRAM(s) Oral daily    MEDICATIONS  (PRN):  aluminum hydroxide/magnesium hydroxide/simethicone Suspension 30 milliLiter(s) Oral every 4 hours PRN Dyspepsia  melatonin 3 milliGRAM(s) Oral at bedtime PRN Insomnia  ondansetron Injectable 4 milliGRAM(s) IV Push every 8 hours PRN Nausea and/or Vomiting    
S:  Patient is a 53 y/o F with a PMH of breast CA s/p mastectomy in 2018, COVID-19 infection in 10/2022, alcohol abuse disorder (actively participating in AA with sponsor), history of Cocaine use (quit in 1999), and former smoker (quit in 2015) who presented to the ED on 11/28/22 with presenting with shortness of breath and abdominal distention. She checked her pulse ox at home and was satting at 91-92% on RA, which prompted her admission to the hospital as she suspected residual COVID symptoms. Patient also mentioned 16 lb weight loss in about the span of a year. Of note, patient has extensive personal and family history of alcohol use. Last alcoholic drink was on 11/24/22. Reports increase in usage since 2020 when COVID started, with reported daily alcohol usage, mostly vodka. No prior hospitalizations for alcohol use, No prior treatment for alcohol use, no history of seizures/delirium tremens.     11/29/22: Today is hospital day 1. Patient seen and examined with  present at bedside. All questions answered. Explained the need for diagnostic paracentesis.   11/30/22: Today is hospital day 2. Patient started on clear liquid diet. Plan for EGD and colonoscopy tomorrow.     O:  Vital Signs Last 24 Hrs  T(C): 37.1 (30 Nov 2022 15:15), Max: 37.2 (30 Nov 2022 00:02)  T(F): 98.7 (30 Nov 2022 15:15), Max: 99 (30 Nov 2022 00:02)  HR: 81 (30 Nov 2022 15:15) (78 - 95)  BP: 105/49 (30 Nov 2022 15:15) (103/52 - 117/70)  RR: 18 (30 Nov 2022 15:15) (17 - 18)  SpO2: 95% (30 Nov 2022 15:15) (94% - 96%)    Parameters below as of 30 Nov 2022 15:15  Patient On (Oxygen Delivery Method): room air    Physical Exam:  General: sitting in bed; appears less jaundiced   Respiratory: CTAB  Cardiovascular: S1 and S2   Gastrointestinal: moderate ascites   Psych: alert and oriented x 3   Skin: No rashes      LABS:                        7.7    7.67  )-----------( 87       ( 30 Nov 2022 06:27 )             22.8     11-30    136  |  106  |  10  ----------------------------<  101<H>  5.0   |  25  |  0.55    Ca    8.2<L>      30 Nov 2022 06:27    TPro  7.4  /  Alb  1.8<L>  /  TBili  4.6<H>  /  DBili  2.2<H>  /  AST  139<H>  /  ALT  28  /  AlkPhos  107  11-30    PT/INR - ( 30 Nov 2022 06:27 )   PT: 21.1 sec;   INR: 1.81 ratio         PTT - ( 30 Nov 2022 06:27 )  PTT:36.8 sec      IMAGING:  < from: Xray Chest 1 View- PORTABLE-Urgent (Xray Chest 1 View- PORTABLE-Urgent .) (11.28.22 @ 14:24) >  FINDINGS: Heart size appears within normal limits. No hilar or superior   mediastinal abnormalities are identified. Blunting of the left   costophrenic angle sulcus suggests the presence of left pleural effusion.   Somewhat rounded opacity within the left lower lung field may represent   atelectasis versus underlying parenchymal abnormality. No evidence for   acute right-sided infiltrate. No right pleural effusion. No pneumothorax.   No mediastinal shift. No acute osseous fractures.    IMPRESSION: As above.  < end of copied text >      < from: CT Angio Chest PE Protocol w/ IV Cont (11.28.22 @ 15:30) >  IMPRESSION:  No pulmonary embolism.    Small left pleural effusion.    Cirrhosis, splenomegaly, portal hypertension and moderate ascites.    Esophageal varices.    Diffuse lytic change of the sternum suspicious for metastatic disease.  < end of copied text >    MEDICATIONS  (STANDING):  folic acid 1 milliGRAM(s) Oral daily  furosemide    Tablet 40 milliGRAM(s) Oral daily  influenza   Vaccine 0.5 milliLiter(s) IntraMuscular once  multivitamin 1 Tablet(s) Oral daily  spironolactone 100 milliGRAM(s) Oral daily  thiamine 100 milliGRAM(s) Oral daily    MEDICATIONS  (PRN):  aluminum hydroxide/magnesium hydroxide/simethicone Suspension 30 milliLiter(s) Oral every 4 hours PRN Dyspepsia  melatonin 3 milliGRAM(s) Oral at bedtime PRN Insomnia  ondansetron Injectable 4 milliGRAM(s) IV Push every 8 hours PRN Nausea and/or Vomiting

## 2022-12-01 NOTE — DIETITIAN INITIAL EVALUATION ADULT - CALCULATED FROM (CAL/KG)
Outpatient Physical Therapy Ortho Treatment Note  APRIL Vogt     Patient Name: Alexander Zamora  : 1947  MRN: [id number]  Today's Date: 2018      Visit Date: 2018    Visit Dx:    ICD-10-CM ICD-9-CM   1. Low back pain, unspecified back pain laterality, unspecified chronicity, with sciatica presence unspecified M54.5 724.2       Patient Active Problem List   Diagnosis   • Prostate cancer   • Erectile dysfunction following radical prostatectomy        Past Medical History:   Diagnosis Date   • Cancer     Postrate   • Elevated prostate specific antigen (PSA)    • Erectile dysfunction    • Hypercholesterolemia    • Hypercholesterolemia    • Prostatitis         Past Surgical History:   Procedure Laterality Date   • HEMORRHOIDECTOMY     • OTHER SURGICAL HISTORY      destruction of hemorrhoids, Rubber band ligatioin of hemmorrhoids   • OTHER SURGICAL HISTORY      surgery for an ulcer, PNEUMOTHORAX AND ULCER THERAPY             PT Ortho     Row Name 18 0900       Subjective Comments    Subjective Comments Patient arrives to therapy w/ reports of 8/10 low back pain.  Pt verbalizes compliance of initiating home program w/ no complaints.   -MARLEN       Subjective Pain    Able to rate subjective pain? yes  -MARLEN    Pre-Treatment Pain Level 8  -MARLEN    Post-Treatment Pain Level 0  -MARLEN    Row Name 18 0900       Subjective Pain    Able to rate subjective pain? yes  -AD    Pre-Treatment Pain Level 0  -AD    Post-Treatment Pain Level 1  -AD       Posture/Observations    Posture- WNL Posture is WNL  -AD       DTR- Lower Quarter Clearing    Patellar tendon (L2-4) Bilateral:;2- Normal response  -AD    Achilles tendon (S1-2) Bilateral:;2- Normal response  -AD       Neural Tension Signs- Lower Quarter Clearing    Slump Bilateral:;Negative  -AD       Pathological Reflexes- Lower Quarter Clearing    Clonus Bilateral:;Negative  -AD       Sensory Screen for Light Touch- Lower Quarter Clearing    L2 (anterior mid  thigh) Left:;Diminished  -AD    L3 (distal anterior thigh) Left:;Diminished  -AD    L4 (medial lower leg/foot) Left:;Diminished  -AD       Myotomal Screen- Lower Quarter Clearing    Hip flexion (L2) Right:;4+ (Good +);Left:;4- (Good -)  -AD    Knee extension (L3) Right:;4+ (Good +);Left:;4- (Good -)  -AD    Ankle DF (L4) Bilateral:;4 (Good)  -AD    Knee flexion (S2) Right:;4+ (Good +);Left:;4- (Good -)  -AD       Lumbar ROM Screen- Lower Quarter Clearing    Lumbar Flexion Impaired   75%  -AD    Lumbar Extension Impaired   75%  -AD    Lumbar Lateral Flexion Impaired   100%  -AD    Lumbar Rotation Impaired   100%  -AD       SI/Hip Screen- Lower Quarter Clearing    ASIS compression Bilateral:;Negative  -AD    ASIS distraction Bilateral:;Positive  -AD    Sandy's/Yoel's test Right:;Negative;Left:;Positive  -AD       Lumbosacral Palpation    Lumbosacral Palpation? --   No tenderness to palpation  -AD       Lumbar/SI Special Tests    Slump Test (Neural Tension) Bilateral:;Negative  -AD    SLR (Neural Tension) Bilateral:;Negative  -AD    SI Compression Test (SI Dysfunction) Bilateral:;Negative  -AD    SI Distraction Test (SI Dysfunction) Left:;Positive  -AD    SANDY (hip vs. SI Dysfunction) Left:;Positive;Right:;Negative  -AD    FAIR Test (Piriformis Syndrome) Left:;Positive;Right:;Negative  -AD       Gait/Stairs Assessment/Training    Gait/Stairs Assessment/Training --   Gait WNL  -AD      User Key  (r) = Recorded By, (t) = Taken By, (c) = Cosigned By    Initials Name Provider Type    AD Ashley Claudene Dalton, PT Physical Therapist    MARLEN Ward PTA Physical Therapy Assistant                            PT Assessment/Plan     Row Name 06/13/18 0259          PT Assessment    Assessment Comments Patient tolerated treatment well today, and verbalized compliance of initiating home program.  Today's session began w/ MH and Estim to low back in seated position f/b therex w/ focus on improved lumbar ROM, lumbar  "stability, and postural awareness.  Treatment concluded with continuous US.  Pt received cues as needed throughout session for improved mechanics and feedback.  No adverse reactions observed following modalities.  Decreased pain noted following, 0/10.  -MARLEN        PT Plan    PT Plan Comments Continue with PT's POC and progress tx as tolerated by patient.   -MARLEN       User Key  (r) = Recorded By, (t) = Taken By, (c) = Cosigned By    Initials Name Provider Type    MARLEN Ward PTA Physical Therapy Assistant                Modalities     Row Name 06/13/18 0900             Moist Heat    MH Applied Yes   no redness observed following MH  -MARLEN      Location lumbar  -MARLEN      Rx Minutes 15 mins  -MARLEN      MH Prior to Rx Yes   w/ estim in seated position  -MARLEN         Ultrasound 80629    Location lumbar paraspinals  -MARLEN      Duty Cycle 100  -MARLEN      Frequency 1.0 MHz  -MARLEN      Intensity - Wts/cm 1.2  -MARLEN         ELECTRICAL STIMULATION    Attended/Unattended Unattended  -MARLEN      Stimulation Type IFC  -MARLEN      Max mAmp --   as to pt's tolerance; x15 min w/MH  -MARLEN      Location/Electrode Placement/Other lumbar   -MARLEN        User Key  (r) = Recorded By, (t) = Taken By, (c) = Cosigned By    Initials Name Provider Type    MARLEN Ward PTA Physical Therapy Assistant                Exercises     Row Name 06/13/18 0900             Subjective Comments    Subjective Comments Patient arrives to therapy w/ reports of 8/10 low back pain.  Pt verbalizes compliance of initiating home program w/ no complaints.   -MARLEN         Subjective Pain    Able to rate subjective pain? yes  -MARLEN      Pre-Treatment Pain Level 8  -MARLEN      Post-Treatment Pain Level 0  -MARLEN         Exercise 1    Exercise Name 1 LTR 10x2, SKTC 3x20\", piriformis stretch 3x20\", PPT 10x2, bridge x15, ball squeeze 10x2, supine clams 10x2, scap squeeze 10x2, SAQ 10x2  -MARLEN      Cueing 1 Verbal;Tactile;Demo  -MARLEN      Time 1 30 min  -MARLEN        User Key  (r) = " Recorded By, (t) = Taken By, (c) = Cosigned By    Initials Name Provider Type    MARLEN Wendy Ward PTA Physical Therapy Assistant                               PT OP Goals     Row Name 06/13/18 1003          Time Calculation    PT Goal Re-Cert Due Date 07/11/18  -MARLEN       User Key  (r) = Recorded By, (t) = Taken By, (c) = Cosigned By    Initials Name Provider Type    MARLEN Ward PTA Physical Therapy Assistant          Therapy Education  Given: HEP, Symptoms/condition management, Pain management, Posture/body mechanics  Program: Reinforced  How Provided: Verbal, Demonstration  Provided to: Patient  Level of Understanding: Verbalized, Demonstrated              Time Calculation:   Start Time: 0855  Stop Time: 0955  Time Calculation (min): 60 min  Therapy Suggested Charges     Code   Minutes Charges    None           Therapy Charges for Today     Code Description Service Date Service Provider Modifiers Qty    81834138618 HC PT THER PROC EA 15 MIN 6/13/2018 Wendy Ward PTA GP 2    99250876426 HC PT ULTRASOUND EA 15 MIN 6/13/2018 Wendy Ward PTA GP 1    31015989897 HC PT ELECTRICAL STIM UNATTENDED 6/13/2018 Wendy Ward PTA  1                    Wendy Ward PTA  6/13/2018      1836

## 2022-12-01 NOTE — DIETITIAN INITIAL EVALUATION ADULT - PERTINENT MEDS FT
MEDICATIONS  (STANDING):  folic acid 1 milliGRAM(s) Oral daily  furosemide    Tablet 40 milliGRAM(s) Oral daily  influenza   Vaccine 0.5 milliLiter(s) IntraMuscular once  multivitamin 1 Tablet(s) Oral daily  spironolactone 100 milliGRAM(s) Oral daily  thiamine 100 milliGRAM(s) Oral daily    MEDICATIONS  (PRN):  aluminum hydroxide/magnesium hydroxide/simethicone Suspension 30 milliLiter(s) Oral every 4 hours PRN Dyspepsia  melatonin 3 milliGRAM(s) Oral at bedtime PRN Insomnia  ondansetron Injectable 4 milliGRAM(s) IV Push every 8 hours PRN Nausea and/or Vomiting

## 2022-12-01 NOTE — DISCHARGE NOTE NURSING/CASE MANAGEMENT/SOCIAL WORK - NSDCPEFALRISK_GEN_ALL_CORE
For information on Fall & Injury Prevention, visit: [email or web address] OR  [email or web address] OR  https://www.cdc.gov/steadi/patient.html

## 2022-12-01 NOTE — DIETITIAN INITIAL EVALUATION ADULT - ORAL INTAKE PTA/DIET HISTORY
Pt reports good appetite/po intake PTA, stopped working out during pandemic and has lost muscle, no dietary restrictions.

## 2022-12-02 ENCOUNTER — NON-APPOINTMENT (OUTPATIENT)
Age: 52
End: 2022-12-02

## 2022-12-02 LAB — SMOOTH MUSCLE AB SER-ACNC: ABNORMAL

## 2022-12-03 LAB
CULTURE RESULTS: SIGNIFICANT CHANGE UP
CULTURE RESULTS: SIGNIFICANT CHANGE UP
SPECIMEN SOURCE: SIGNIFICANT CHANGE UP
SPECIMEN SOURCE: SIGNIFICANT CHANGE UP

## 2022-12-04 LAB
CULTURE RESULTS: SIGNIFICANT CHANGE UP
SPECIMEN SOURCE: SIGNIFICANT CHANGE UP

## 2022-12-06 DIAGNOSIS — Z86.16 PERSONAL HISTORY OF COVID-19: ICD-10-CM

## 2022-12-06 DIAGNOSIS — D53.9 NUTRITIONAL ANEMIA, UNSPECIFIED: ICD-10-CM

## 2022-12-06 DIAGNOSIS — F10.10 ALCOHOL ABUSE, UNCOMPLICATED: ICD-10-CM

## 2022-12-06 DIAGNOSIS — D69.6 THROMBOCYTOPENIA, UNSPECIFIED: ICD-10-CM

## 2022-12-06 DIAGNOSIS — R50.9 FEVER, UNSPECIFIED: ICD-10-CM

## 2022-12-06 DIAGNOSIS — J90 PLEURAL EFFUSION, NOT ELSEWHERE CLASSIFIED: ICD-10-CM

## 2022-12-06 DIAGNOSIS — K76.6 PORTAL HYPERTENSION: ICD-10-CM

## 2022-12-06 DIAGNOSIS — K70.11 ALCOHOLIC HEPATITIS WITH ASCITES: ICD-10-CM

## 2022-12-06 DIAGNOSIS — Z90.13 ACQUIRED ABSENCE OF BILATERAL BREASTS AND NIPPLES: ICD-10-CM

## 2022-12-06 DIAGNOSIS — K70.31 ALCOHOLIC CIRRHOSIS OF LIVER WITH ASCITES: ICD-10-CM

## 2022-12-06 DIAGNOSIS — K64.8 OTHER HEMORRHOIDS: ICD-10-CM

## 2022-12-06 DIAGNOSIS — K31.89 OTHER DISEASES OF STOMACH AND DUODENUM: ICD-10-CM

## 2022-12-06 DIAGNOSIS — K31.7 POLYP OF STOMACH AND DUODENUM: ICD-10-CM

## 2022-12-06 DIAGNOSIS — I85.10 SECONDARY ESOPHAGEAL VARICES WITHOUT BLEEDING: ICD-10-CM

## 2022-12-06 DIAGNOSIS — Z85.3 PERSONAL HISTORY OF MALIGNANT NEOPLASM OF BREAST: ICD-10-CM

## 2022-12-06 DIAGNOSIS — I83.90 ASYMPTOMATIC VARICOSE VEINS OF UNSPECIFIED LOWER EXTREMITY: ICD-10-CM

## 2022-12-06 DIAGNOSIS — M89.9 DISORDER OF BONE, UNSPECIFIED: ICD-10-CM

## 2022-12-06 DIAGNOSIS — Z87.891 PERSONAL HISTORY OF NICOTINE DEPENDENCE: ICD-10-CM

## 2022-12-06 LAB
ANA PAT FLD IF-IMP: ABNORMAL
ANA TITR SER: ABNORMAL

## 2022-12-07 ENCOUNTER — NON-APPOINTMENT (OUTPATIENT)
Age: 52
End: 2022-12-07

## 2022-12-07 DIAGNOSIS — L91.8 OTHER HYPERTROPHIC DISORDERS OF THE SKIN: ICD-10-CM

## 2022-12-07 DIAGNOSIS — Z86.03 PERSONAL HISTORY OF NEOPLASM OF UNCERTAIN BEHAVIOR: ICD-10-CM

## 2022-12-07 DIAGNOSIS — R18.8 OTHER ASCITES: ICD-10-CM

## 2022-12-07 DIAGNOSIS — Z87.898 PERSONAL HISTORY OF OTHER SPECIFIED CONDITIONS: ICD-10-CM

## 2022-12-07 DIAGNOSIS — Z86.59 PERSONAL HISTORY OF OTHER MENTAL AND BEHAVIORAL DISORDERS: ICD-10-CM

## 2022-12-07 DIAGNOSIS — Z86.018 PERSONAL HISTORY OF OTHER BENIGN NEOPLASM: ICD-10-CM

## 2022-12-07 DIAGNOSIS — Z12.83 ENCOUNTER FOR SCREENING FOR MALIGNANT NEOPLASM OF SKIN: ICD-10-CM

## 2022-12-07 DIAGNOSIS — J90 PLEURAL EFFUSION, NOT ELSEWHERE CLASSIFIED: ICD-10-CM

## 2022-12-07 DIAGNOSIS — Z86.79 PERSONAL HISTORY OF OTHER DISEASES OF THE CIRCULATORY SYSTEM: ICD-10-CM

## 2022-12-07 DIAGNOSIS — F14.90 COCAINE USE, UNSPECIFIED, UNCOMPLICATED: ICD-10-CM

## 2022-12-08 PROBLEM — Z87.891 PERSONAL HISTORY OF NICOTINE DEPENDENCE: Chronic | Status: ACTIVE | Noted: 2022-11-29

## 2022-12-08 PROBLEM — F10.10 ALCOHOL ABUSE, UNCOMPLICATED: Chronic | Status: ACTIVE | Noted: 2022-11-29

## 2022-12-08 PROBLEM — U07.1 COVID-19: Chronic | Status: ACTIVE | Noted: 2022-12-03

## 2022-12-08 PROBLEM — F14.91 COCAINE USE, UNSPECIFIED, IN REMISSION: Chronic | Status: ACTIVE | Noted: 2022-11-29

## 2022-12-08 PROBLEM — C50.919 MALIGNANT NEOPLASM OF UNSPECIFIED SITE OF UNSPECIFIED FEMALE BREAST: Chronic | Status: ACTIVE | Noted: 2022-12-03

## 2022-12-10 ENCOUNTER — EMERGENCY (EMERGENCY)
Facility: HOSPITAL | Age: 52
LOS: 0 days | Discharge: HOME | End: 2022-12-10
Attending: EMERGENCY MEDICINE
Payer: COMMERCIAL

## 2022-12-10 VITALS
DIASTOLIC BLOOD PRESSURE: 56 MMHG | OXYGEN SATURATION: 99 % | RESPIRATION RATE: 16 BRPM | HEART RATE: 68 BPM | SYSTOLIC BLOOD PRESSURE: 104 MMHG

## 2022-12-10 VITALS — WEIGHT: 130.07 LBS | HEIGHT: 64 IN

## 2022-12-10 DIAGNOSIS — D63.8 ANEMIA IN OTHER CHRONIC DISEASES CLASSIFIED ELSEWHERE: ICD-10-CM

## 2022-12-10 DIAGNOSIS — Z87.891 PERSONAL HISTORY OF NICOTINE DEPENDENCE: ICD-10-CM

## 2022-12-10 DIAGNOSIS — Z86.59 PERSONAL HISTORY OF OTHER MENTAL AND BEHAVIORAL DISORDERS: ICD-10-CM

## 2022-12-10 DIAGNOSIS — Z90.13 ACQUIRED ABSENCE OF BILATERAL BREASTS AND NIPPLES: ICD-10-CM

## 2022-12-10 DIAGNOSIS — Z87.59 PERSONAL HISTORY OF OTHER COMPLICATIONS OF PREGNANCY, CHILDBIRTH AND THE PUERPERIUM: ICD-10-CM

## 2022-12-10 DIAGNOSIS — K70.30 ALCOHOLIC CIRRHOSIS OF LIVER WITHOUT ASCITES: ICD-10-CM

## 2022-12-10 DIAGNOSIS — Z20.822 CONTACT WITH AND (SUSPECTED) EXPOSURE TO COVID-19: ICD-10-CM

## 2022-12-10 DIAGNOSIS — D64.9 ANEMIA, UNSPECIFIED: ICD-10-CM

## 2022-12-10 DIAGNOSIS — Z86.16 PERSONAL HISTORY OF COVID-19: ICD-10-CM

## 2022-12-10 DIAGNOSIS — F10.19 ALCOHOL ABUSE WITH UNSPECIFIED ALCOHOL-INDUCED DISORDER: ICD-10-CM

## 2022-12-10 DIAGNOSIS — H15.89 OTHER DISORDERS OF SCLERA: ICD-10-CM

## 2022-12-10 DIAGNOSIS — Z98.891 HISTORY OF UTERINE SCAR FROM PREVIOUS SURGERY: Chronic | ICD-10-CM

## 2022-12-10 DIAGNOSIS — Z90.13 ACQUIRED ABSENCE OF BILATERAL BREASTS AND NIPPLES: Chronic | ICD-10-CM

## 2022-12-10 DIAGNOSIS — Z85.3 PERSONAL HISTORY OF MALIGNANT NEOPLASM OF BREAST: ICD-10-CM

## 2022-12-10 LAB
ALBUMIN SERPL ELPH-MCNC: 2.6 G/DL — LOW (ref 3.3–5)
ALP SERPL-CCNC: 96 U/L — SIGNIFICANT CHANGE UP (ref 40–120)
ALT FLD-CCNC: 37 U/L — SIGNIFICANT CHANGE UP (ref 12–78)
ANION GAP SERPL CALC-SCNC: 6 MMOL/L — SIGNIFICANT CHANGE UP (ref 5–17)
APTT BLD: 37.5 SEC — HIGH (ref 27.5–35.5)
AST SERPL-CCNC: 95 U/L — HIGH (ref 15–37)
BASOPHILS # BLD AUTO: 0.05 K/UL — SIGNIFICANT CHANGE UP (ref 0–0.2)
BASOPHILS NFR BLD AUTO: 0.9 % — SIGNIFICANT CHANGE UP (ref 0–2)
BILIRUB SERPL-MCNC: 3.3 MG/DL — HIGH (ref 0.2–1.2)
BUN SERPL-MCNC: 12 MG/DL — SIGNIFICANT CHANGE UP (ref 7–23)
CALCIUM SERPL-MCNC: 8.9 MG/DL — SIGNIFICANT CHANGE UP (ref 8.5–10.1)
CHLORIDE SERPL-SCNC: 103 MMOL/L — SIGNIFICANT CHANGE UP (ref 96–108)
CO2 SERPL-SCNC: 28 MMOL/L — SIGNIFICANT CHANGE UP (ref 22–31)
CREAT SERPL-MCNC: 0.76 MG/DL — SIGNIFICANT CHANGE UP (ref 0.5–1.3)
EGFR: 95 ML/MIN/1.73M2 — SIGNIFICANT CHANGE UP
EOSINOPHIL # BLD AUTO: 0.11 K/UL — SIGNIFICANT CHANGE UP (ref 0–0.5)
EOSINOPHIL NFR BLD AUTO: 2 % — SIGNIFICANT CHANGE UP (ref 0–6)
FLUAV AG NPH QL: SIGNIFICANT CHANGE UP
FLUBV AG NPH QL: SIGNIFICANT CHANGE UP
GLUCOSE SERPL-MCNC: 108 MG/DL — HIGH (ref 70–99)
HCG SERPL-ACNC: <1 MIU/ML — SIGNIFICANT CHANGE UP
HCT VFR BLD CALC: 21.4 % — LOW (ref 34.5–45)
HGB BLD-MCNC: 7.4 G/DL — LOW (ref 11.5–15.5)
IMM GRANULOCYTES NFR BLD AUTO: 0.2 % — SIGNIFICANT CHANGE UP (ref 0–0.9)
INR BLD: 1.76 RATIO — HIGH (ref 0.88–1.16)
LYMPHOCYTES # BLD AUTO: 1.18 K/UL — SIGNIFICANT CHANGE UP (ref 1–3.3)
LYMPHOCYTES # BLD AUTO: 21.8 % — SIGNIFICANT CHANGE UP (ref 13–44)
MCHC RBC-ENTMCNC: 34.6 GM/DL — SIGNIFICANT CHANGE UP (ref 32–36)
MCHC RBC-ENTMCNC: 38.9 PG — HIGH (ref 27–34)
MCV RBC AUTO: 112.6 FL — HIGH (ref 80–100)
MONOCYTES # BLD AUTO: 0.41 K/UL — SIGNIFICANT CHANGE UP (ref 0–0.9)
MONOCYTES NFR BLD AUTO: 7.6 % — SIGNIFICANT CHANGE UP (ref 2–14)
NEUTROPHILS # BLD AUTO: 3.65 K/UL — SIGNIFICANT CHANGE UP (ref 1.8–7.4)
NEUTROPHILS NFR BLD AUTO: 67.5 % — SIGNIFICANT CHANGE UP (ref 43–77)
PLATELET # BLD AUTO: 122 K/UL — LOW (ref 150–400)
POTASSIUM SERPL-MCNC: 3.3 MMOL/L — LOW (ref 3.5–5.3)
POTASSIUM SERPL-SCNC: 3.3 MMOL/L — LOW (ref 3.5–5.3)
PROT SERPL-MCNC: 8.7 GM/DL — HIGH (ref 6–8.3)
PROTHROM AB SERPL-ACNC: 20.5 SEC — HIGH (ref 10.5–13.4)
RBC # BLD: 1.9 M/UL — LOW (ref 3.8–5.2)
RBC # FLD: 15.8 % — HIGH (ref 10.3–14.5)
RSV RNA NPH QL NAA+NON-PROBE: SIGNIFICANT CHANGE UP
SARS-COV-2 RNA SPEC QL NAA+PROBE: SIGNIFICANT CHANGE UP
SODIUM SERPL-SCNC: 137 MMOL/L — SIGNIFICANT CHANGE UP (ref 135–145)
WBC # BLD: 5.41 K/UL — SIGNIFICANT CHANGE UP (ref 3.8–10.5)
WBC # FLD AUTO: 5.41 K/UL — SIGNIFICANT CHANGE UP (ref 3.8–10.5)

## 2022-12-10 PROCEDURE — 86901 BLOOD TYPING SEROLOGIC RH(D): CPT

## 2022-12-10 PROCEDURE — 0241U: CPT

## 2022-12-10 PROCEDURE — 36415 COLL VENOUS BLD VENIPUNCTURE: CPT

## 2022-12-10 PROCEDURE — 84702 CHORIONIC GONADOTROPIN TEST: CPT

## 2022-12-10 PROCEDURE — 99284 EMERGENCY DEPT VISIT MOD MDM: CPT

## 2022-12-10 PROCEDURE — 85025 COMPLETE CBC W/AUTO DIFF WBC: CPT

## 2022-12-10 PROCEDURE — 99283 EMERGENCY DEPT VISIT LOW MDM: CPT

## 2022-12-10 PROCEDURE — 86850 RBC ANTIBODY SCREEN: CPT

## 2022-12-10 PROCEDURE — 86900 BLOOD TYPING SEROLOGIC ABO: CPT

## 2022-12-10 PROCEDURE — 85730 THROMBOPLASTIN TIME PARTIAL: CPT

## 2022-12-10 PROCEDURE — 85610 PROTHROMBIN TIME: CPT

## 2022-12-10 PROCEDURE — 80053 COMPREHEN METABOLIC PANEL: CPT

## 2022-12-10 NOTE — ED PROVIDER NOTE - NSICDXPASTMEDICALHX_GEN_ALL_CORE_FT
PAST MEDICAL HISTORY:  2019 novel coronavirus disease (COVID-19)     Breast cancer s/p bilateral mastectomy in 2018    Chronic alcohol abuse     Former smoker quit in 2015    History of cocaine use quit in 1999      Cirrhosis

## 2022-12-10 NOTE — ED ADULT NURSE NOTE - NSIMPLEMENTINTERV_GEN_ALL_ED
Implemented All Universal Safety Interventions:  [place] to call system. Call bell, personal items and telephone within reach. Instruct patient to call for assistance. Room bathroom lighting operational. Non-slip footwear when patient is off stretcher. Physically safe environment: no spills, clutter or unnecessary equipment. Stretcher in lowest position, wheels locked, appropriate side rails in place.

## 2022-12-10 NOTE — ED PROVIDER NOTE - NSFOLLOWUPINSTRUCTIONS_ED_ALL_ED_FT
Anemia       Anemia is a condition in which there is not enough red blood cells or hemoglobin in the blood. Hemoglobin is a substance in red blood cells that carries oxygen.    When you do not have enough red blood cells or hemoglobin (are anemic), your body cannot get enough oxygen and your organs may not work properly. As a result, you may feel very tired or have other problems.      What are the causes?    Common causes of anemia include:  •Excessive bleeding. Anemia can be caused by excessive bleeding inside or outside the body, including bleeding from the intestines or from heavy menstrual periods in females.      •Poor nutrition.      •Long-lasting (chronic) kidney, thyroid, and liver disease.      •Bone marrow disorders, spleen problems, and blood disorders.      •Cancer and treatments for cancer.      •HIV (human immunodeficiency virus) and AIDS (acquired immunodeficiency syndrome).      •Infections, medicines, and autoimmune disorders that destroy red blood cells.        What are the signs or symptoms?    Symptoms of this condition include:  •Minor weakness.      •Dizziness.      •Headache, or difficulties concentrating and sleeping.      •Heartbeats that feel irregular or faster than normal (palpitations).      •Shortness of breath, especially with exercise.      •Pale skin, lips, and nails, or cold hands and feet.      •Indigestion and nausea.      Symptoms may occur suddenly or develop slowly. If your anemia is mild, you may not have symptoms.      How is this diagnosed?    This condition is diagnosed based on blood tests, your medical history, and a physical exam. In some cases, a test may be needed in which cells are removed from the soft tissue inside of a bone and looked at under a microscope (bone marrow biopsy). Your health care provider may also check your stool (feces) for blood and may do additional testing to look for the cause of your bleeding.    Other tests may include:  •Imaging tests, such as a CT scan or MRI.      •A procedure to see inside your esophagus and stomach (endoscopy).      •A procedure to see inside your colon and rectum (colonoscopy).        How is this treated?    Treatment for this condition depends on the cause. If you continue to lose a lot of blood, you may need to be treated at a hospital. Treatment may include:  •Taking supplements of iron, vitamin B12, or folic acid.      •Taking a hormone medicine (erythropoietin) that can help to stimulate red blood cell growth.      •Having a blood transfusion. This may be needed if you lose a lot of blood.      •Making changes to your diet.      •Having surgery to remove your spleen.        Follow these instructions at home:    •Take over-the-counter and prescription medicines only as told by your health care provider.      •Take supplements only as told by your health care provider.      •Follow any diet instructions that you were given by your health care provider.      •Keep all follow-up visits as told by your health care provider. This is important.        Contact a health care provider if:    •You develop new bleeding anywhere in the body.        Get help right away if:    •You are very weak.      •You are short of breath.      •You have pain in your abdomen or chest.      •You are dizzy or feel faint.      •You have trouble concentrating.      •You have bloody stools, black stools, or tarry stools.      •You vomit repeatedly or you vomit up blood.      These symptoms may represent a serious problem that is an emergency. Do not wait to see if the symptoms will go away. Get medical help right away. Call your local emergency services (911 in the [place] Do not drive yourself to the hospital.       Summary    •Anemia is a condition in which you do not have enough red blood cells or enough of a substance in your red blood cells that carries oxygen (hemoglobin).      •Symptoms may occur suddenly or develop slowly.      •If your anemia is mild, you may not have symptoms.      •This condition is diagnosed with blood tests, a medical history, and a physical exam. Other tests may be needed.      •Treatment for this condition depends on the cause of the anemia.      This information is not intended to replace advice given to you by your health care provider. Make sure you discuss any questions you have with your health care provider.      Document Revised: 11/01/2022 Document Reviewed: 11/24/2020    Elsevier Patient Education © 2022 [employer]

## 2022-12-10 NOTE — ED PROVIDER NOTE - CHPI ED SYMPTOMS POS
Spoke with patient wife she states its hard for patient to get off from work to come in for and appointment asking if something can be called in to pharmacy     anemia

## 2022-12-10 NOTE — ED PROVIDER NOTE - CLINICAL SUMMARY MEDICAL DECISION MAKING FREE TEXT BOX
No bleeding on exam.  Patient is asymptomatic.  Hemoglobin is 7.4.  Patient hemodynamically stable.  She does not require blood transfusion or further workup at this time. Okay for d/c home at this time. Return precautions given.

## 2022-12-10 NOTE — ED PROVIDER NOTE - GASTROINTESTINAL NEGATIVE STATEMENT, MLM
no abdominal pain, no blood in stool, no bloating, no constipation, no diarrhea, no nausea and no vomiting.

## 2022-12-10 NOTE — ED PROVIDER NOTE - COVID-19 RESULT
Phone call to patient at request of Christie Celaya NP, Palliative Care.  Patient had expressed interest in completing Advanced Directive.   SW calling to schedule appt. Patient requested information be mailed to him for review and SW to follow up in 1-2 weeks to offer appt for completion.  Patient states following appt with Christie he is reconsidering his decision to be DNR and is thinking further about revoking order. He has not made a decision yet. Patient was on way to medical appt so unable to speak with KATHY further about DNR.    AD mailed to pt for review.  Update to Christie regarding pts uncertainty about DNR.  
NEGATIVE

## 2022-12-10 NOTE — ED PROVIDER NOTE - PATIENT PORTAL LINK FT
You can access the FollowMyHealth Patient Portal offered by [facility] by registering at the following website: [email or web address]. By joining [facility]’s FollowMyHealth portal, you will also be able to view your health information using other applications (apps) compatible with our system.

## 2022-12-10 NOTE — ED PROVIDER NOTE - OBJECTIVE STATEMENT
52 year old female with PMHx of breast cancer, alcohol abuse, and cirrhosis presents to the ED sent in by Dr. Joseph for anemia. Pt had recent hospitalization for advanced cirrhosis and anemia, discharged on 12/1. Had endoscopy and colonoscopy which demonstrated trace esophageal varices, portal gastropathy, gastric polyp, and internal hemorrhoids, recommends to continue Lasix and Aldactone. Blood work redrawn yesterday that showed a hemoglobin of 6.8, down from 7.9 on 12/1. Denies any symptoms including lightheadedness, dizziness, chest pain, SOB, abdominal pain, nausea, vomiting, diarrhea, bloody/dark stools. Pt had a negative guaiac in office with Dr. Joseph yesterday and was told to come to ED for further evaluation.

## 2022-12-10 NOTE — ED ADULT NURSE NOTE - OBJECTIVE STATEMENT
Pt with PMHx of cirrhosis, anemia, breast ca, presents for low hemoglobin. Pt states she had bloodwork done with her GI MD few days ago. Pt states the results of that count was low, had it redrawn at the same office yesterday and had it return at 6.8. Pt sent to ED for possible transfusion. Pt denies any medical complaints or symptoms. Pt states "I feel great". Pt states only reason for coming in was due to MD telling her to come in. Pt denies any sob, dizziness, FAIR, CP, syncope.

## 2022-12-10 NOTE — ED ADULT NURSE NOTE - COVID-19 RESULT DATE/TIME
Progress Notes by Estefania Varela MD at 03/09/18 02:27 PM     Author:  Estefania Varela MD Service:  (none) Author Type:  Physician     Filed:  03/21/18 02:07 PM Encounter Date:  3/9/2018 Status:  Signed     :  Estefania Varela MD (Physician)              PEDIATRIC ILLNESS VISIT   3/9/2018        Roomed by: Adan Guo CMA 2:27 PM      SUBJECTIVE  Accompanied by:[KS1.1T]  Mother and Father[KS1.1M]  Cara is a 18 year old female who is[KS1.1T] here for a follow up on[KS1.1M] U[MH1.1M]C visit on Monday 03/05/18[KS1.1M].[KS1.1T] She also complains of a cyst on right buttocks for a while and now for past month growing bigger and is painful.[KS1.1M] and is[KS1.1T] stable[KS1.1M].  Present treatments include -[KS1.1T] Rx:   see meds list[KS1.1M].  Patient also sent home on pyridium for a few days and was encouraged to use ibuprofen 600 mg. TID prn[MH1.1M] .   Previous medical contacts for the problem -[KS1.1T]  [facility][KS1.1M] ER on 3/5/18 for painful urination and abdominal pain.  Patient was seen initially at  and had a negative U/A screen and pregnancy test but was exam there was significant for right lower quadrant tenderness.   Patient was also complaining of  bilateral flank pains.  In addition, patient reported night sweats  few days prior to being seen although denied any fevers.  Patient is on OCPs and last menses was 2 days prior to being seen.   Symptoms have improved[MH1.1M]  Symptoms:  Fever:[KS1.1T]     No elevation of temperature[KS1.1M]  General:[KS1.1T] No loss of appetite  GI:    MILD ABDOMINAL PAIN   * Location: RLQ associated with present illness and FLANK PAIN bilateral  ;  DYSURIA AND FREQUENCY  Skin:    LUMP/BUMP: right buttock    Symptoms: no symptoms[MH1.1M]    ROS[KS1.1T]  No vomiting  No diarrhea[MH1.1M]  All other ROS negative unless indicated otherwise above.    Allergies:  Review of patient's allergies indicates no known allergies.    Current Outpatient  Prescriptions     Medication  Sig   • phenazopyridine (PYRIDIUM) 100 MG tablet Take 100 mg by mouth.   • norethindrone-ethinyl estradiol (LOESTRIN FE 1/20) 1-20 MG-MCG per tablet Take 1 Tab by mouth daily.   • fluticasone-salmeterol (ADVAIR HFA) 230-21 MCG/ACT inhaler Inhale 2 Puffs by mouth.   • busPIRone (BUSPAR) 10 MG tablet    • fluoxetine (PROZAC) 20 MG Cap    • Mirtazapine (REMERON) 15 MG tablet    • Spacer/Aero-Holding Chambers (AEROCHAMBER MAX W/FLOW-VU) MISC Use as directed   • albuterol (PROAIR HFA) 108 (90 BASE) MCG/ACT inhaler Inhale 2 Puffs by mouth every 4 (four) hours as needed for Wheezing.[KS1.1T]     Past Medical History:     Diagnosis  Date   • Acetabular labrum tear     Right hip; scheduled for surgery 3/13/14; rescheduled    • Javier-Danlos syndrome, benign hypermobile form    • Left lower lobe pneumonia 3/10/14   • RSV bronchiolitis 2 months old[MH1.2T]    Depression  Anxiety  Eating Disorder  History of cutting[MH1.1M]     Past Surgical History:      Procedure  Laterality Date   • HIP ARTHROSCPY W/LABRAL REPAIR Right 4/2014[MH1.3T]         Thumb amputation[MH1.1M]    Family history:[KS1.1T] No other family members have acute illnesses[KS1.1M]  Family history of depression in mother[MH1.1M]    Social history: Attends school[KS1.1M]      OBJECTIVE:   Physical exam[KS1.1T]  Blood pressure 106/64, pulse 104, temperature 98.8 °F (37.1 °C), temperature source Temporal, resp. rate 18, weight 119 lb (54 kg), last menstrual period 02/26/2018, SpO2 98 %.[KS1.2T]      G[KS1.1M]ENERAL:[KS1.1T] alert, no distress, cooperative APPEARS TIRED   HEAD & SCALP: No lesions, swelling, tenderness or abnormalities.  EYES: No redness, swelling, drainage or abnormalities.  EARS: No abnormalities of external ears, canals or tm's.  NOSE: No swelling or drainage.  MOUTH: No abnormalities of tongue or mucosal membranes.  THROAT: No redness or lesions.  NECK: No masses, swelling or tenderness. No abnormal lymph  nodes.  CHEST: Lungs are clear to auscultation and no retractions.  CARDIO: No murmur or cardiac rhythm irregularities.  ABDOMEN: Soft, normal bowel sounds, no organomegaly, masses or tenderness.  SKIN: ILL DEFINED SOFT TISSUE MASS ON RIGHT BUTTOCK ~4 CM. , NON-TENDER, NO OVERLYING ERYTHEMA OR PUSTULE  NEURO: Gait normal. Reflexes normal and symmetric. No pathological reflexes.  Sensation grosslly normal.  Cranial Nerves 2-12 intact.  MUS-SKELl:  --- No edema, brisk capillary refill      LAB RESULTS FROM 3/5/18 ([facility]) REVIEWED:  NORMAL CBC and diff, CMP, U/A  and urine culture    RESULTS OF IMAGING STUDIES REVIEWED;  MILD BILTERAL HYDRONEPHROSIS AND A DISTENDED URINARY BLADDER ON CT OF ABDOMEN AND PELOVIS WITH ORAL CONTRAST;   DOMINANT FOLLICLE  WITHIN THE OVARY; PARTIALLY VISUALIZED APPENDIX; NO INFLAMMATORY SIGNS OF APPENDICITIS SEEN  OTHERWISE NORMAL  Patient voided large amount of urine reportedly after study[MH1.1M]      ASSESSMENT/PLAN[KS1.1T]  1. Hydronephrosis, unspecified hydronephrosis type[MH1.4T] - Mild, Bilateral         No prior history of UTIs         Will refer to pediatric urologist for further evaluation and recommendations (? VU reflux although doubtful with unremarkable history)[MH1.1M]   2. Flank pain[MH1.4T]          Normal U/A and negative urine culture; afebrile         Discussed differential  diagnoses; doubt urolithiasis as no hematuria[MH1.1M]   3. Abdominal pain, right lower quadrant[MH1.4T]          No signs of appendiceal inflammation of CT of abdomen and pelvis with contrast 3/5/18         has history of constipation; diet reviewed; push fluids[MH1.1M]   4. Soft tissue mass[MH1.4T] - Right Buttock         Reportedly enlarging; possible lipoma; referred to dermatologist (mother prefers to see own derm)[MH1.1M]   5. Dysuria[MH1.4T] - Improving          Normal U/A and Urine culture; on pyridium; may discontinue          Push fluids; reviewed proper wiping techniques; local  hygiene[MH1.1M]   6. Frequency of micturition[MH1.4T] - RESOLVED          Urine culture negative[MH1.1M]    7. Depression, unspecified depression type[MH1.4T] with Anxiety          Doing well on Buspar, Remeron and Prozac          Followed by Psych and sees Carmen Moreno - therapist at [facility]          8.     Distended Badder                   Possibly transient as patient voided fter study[MH1.1M]      Medication changes:[KS1.1T] No[MH1.1M]    Immunizations given today?[KS1.1T] No.  Not given due to guardian refusing other immunizations[MH1.1M]  See Orders:  Instructed to call if the problem worsens or does not improve within the next 24 to 48 hours.    Schedule follow-up:[KS1.1T] prn after labs and/or imaging, consults, etc. have been completed   40[MH1.1M]  minutes spent with patient; greater than 50% of office visit was dedicated to counseling, reviewing tests and lab results, treatment options[MH1.1T]; coordinating care[MH1.1M]  and discussing follow-up plans.[MH1.1T]    Estefania Varela MD[MH1.5T]        Revision History        User Key Date/Time User Provider Type Action    > MH1.5 03/21/18 02:07 PM Estefania Varela MD Physician Sign     MH1.4 03/21/18 01:56 PM Estefania Varela MD Physician      MH1.3 03/21/18 01:44 PM Estefania Varela MD Physician      MH1.2 03/21/18 01:43 PM Estefania Varela MD Physician      MH1.1 03/21/18 01:36 PM Estefania Varela MD Physician      KS1.2 03/09/18 02:34 PM Adan Guo CMA Certified Medical Assistant Sign at close encounter     KS1.1 03/09/18 02:27 PM Adan Guo CMA Certified Medical Assistant     M - Manual, T - Template             28-Nov-2022 14:59

## 2022-12-13 ENCOUNTER — OUTPATIENT (OUTPATIENT)
Dept: OUTPATIENT SERVICES | Facility: HOSPITAL | Age: 52
LOS: 1 days | Discharge: HOME | End: 2022-12-13

## 2022-12-13 DIAGNOSIS — Z98.891 HISTORY OF UTERINE SCAR FROM PREVIOUS SURGERY: Chronic | ICD-10-CM

## 2022-12-13 DIAGNOSIS — Z90.13 ACQUIRED ABSENCE OF BILATERAL BREASTS AND NIPPLES: Chronic | ICD-10-CM

## 2022-12-13 DIAGNOSIS — D64.9 ANEMIA, UNSPECIFIED: ICD-10-CM

## 2022-12-13 PROBLEM — K74.60 UNSPECIFIED CIRRHOSIS OF LIVER: Chronic | Status: ACTIVE | Noted: 2022-12-10

## 2022-12-19 ENCOUNTER — RESULT REVIEW (OUTPATIENT)
Age: 52
End: 2022-12-19

## 2022-12-19 ENCOUNTER — APPOINTMENT (OUTPATIENT)
Dept: HEMATOLOGY ONCOLOGY | Facility: CLINIC | Age: 52
End: 2022-12-19
Payer: COMMERCIAL

## 2022-12-19 VITALS
TEMPERATURE: 98.2 F | BODY MASS INDEX: 20.68 KG/M2 | WEIGHT: 136 LBS | DIASTOLIC BLOOD PRESSURE: 74 MMHG | HEART RATE: 92 BPM | RESPIRATION RATE: 17 BRPM | OXYGEN SATURATION: 100 % | SYSTOLIC BLOOD PRESSURE: 117 MMHG

## 2022-12-19 DIAGNOSIS — K70.31 ALCOHOLIC CIRRHOSIS OF LIVER WITH ASCITES: ICD-10-CM

## 2022-12-19 DIAGNOSIS — D61.818 OTHER PANCYTOPENIA: ICD-10-CM

## 2022-12-19 DIAGNOSIS — R79.1 ABNORMAL COAGULATION PROFILE: ICD-10-CM

## 2022-12-19 DIAGNOSIS — M89.8X8 OTHER SPECIFIED DISORDERS OF BONE, OTHER SITE: ICD-10-CM

## 2022-12-19 DIAGNOSIS — Z85.3 PERSONAL HISTORY OF MALIGNANT NEOPLASM OF BREAST: ICD-10-CM

## 2022-12-19 LAB
ANISOCYTOSIS BLD QL: SLIGHT — SIGNIFICANT CHANGE UP
BASOPHILS # BLD AUTO: 0.03 K/UL — SIGNIFICANT CHANGE UP (ref 0–0.2)
BASOPHILS NFR BLD AUTO: 0.6 % — SIGNIFICANT CHANGE UP (ref 0–2)
DACRYOCYTES BLD QL SMEAR: SLIGHT — SIGNIFICANT CHANGE UP
EOSINOPHIL # BLD AUTO: 0.1 K/UL — SIGNIFICANT CHANGE UP (ref 0–0.5)
EOSINOPHIL NFR BLD AUTO: 2 % — SIGNIFICANT CHANGE UP (ref 0–6)
HCT VFR BLD CALC: 20.6 % — CRITICAL LOW (ref 34.5–45)
HGB BLD-MCNC: 7.2 G/DL — LOW (ref 11.5–15.5)
HYPOCHROMIA BLD QL: SLIGHT — SIGNIFICANT CHANGE UP
IMM GRANULOCYTES NFR BLD AUTO: 0.2 % — SIGNIFICANT CHANGE UP (ref 0–0.9)
LYMPHOCYTES # BLD AUTO: 1.45 K/UL — SIGNIFICANT CHANGE UP (ref 1–3.3)
LYMPHOCYTES # BLD AUTO: 28.6 % — SIGNIFICANT CHANGE UP (ref 13–44)
MACROCYTES BLD QL: SIGNIFICANT CHANGE UP
MCHC RBC-ENTMCNC: 35 GM/DL — SIGNIFICANT CHANGE UP (ref 32–36)
MCHC RBC-ENTMCNC: 37.7 PG — HIGH (ref 27–34)
MCV RBC AUTO: 107.9 FL — HIGH (ref 80–100)
MICROCYTES BLD QL: SLIGHT — SIGNIFICANT CHANGE UP
MONOCYTES # BLD AUTO: 0.46 K/UL — SIGNIFICANT CHANGE UP (ref 0–0.9)
MONOCYTES NFR BLD AUTO: 9.1 % — SIGNIFICANT CHANGE UP (ref 2–14)
NEUTROPHILS # BLD AUTO: 3.02 K/UL — SIGNIFICANT CHANGE UP (ref 1.8–7.4)
NEUTROPHILS NFR BLD AUTO: 59.5 % — SIGNIFICANT CHANGE UP (ref 43–77)
NRBC # BLD: 0 /100 WBCS — SIGNIFICANT CHANGE UP (ref 0–0)
PLAT MORPH BLD: NORMAL — SIGNIFICANT CHANGE UP
PLATELET # BLD AUTO: 81 K/UL — LOW (ref 150–400)
RBC # BLD: 1.91 M/UL — LOW (ref 3.8–5.2)
RBC # FLD: 14.2 % — SIGNIFICANT CHANGE UP (ref 10.3–14.5)
RBC BLD AUTO: SIGNIFICANT CHANGE UP
WBC # BLD: 5.07 K/UL — SIGNIFICANT CHANGE UP (ref 3.8–10.5)
WBC # FLD AUTO: 5.07 K/UL — SIGNIFICANT CHANGE UP (ref 3.8–10.5)

## 2022-12-19 PROCEDURE — 99215 OFFICE O/P EST HI 40 MIN: CPT

## 2022-12-19 NOTE — PHYSICAL EXAM
[Restricted in physically strenuous activity but ambulatory and able to carry out work of a light or sedentary nature] : Status 1- Restricted in physically strenuous activity but ambulatory and able to carry out work of a light or sedentary nature, e.g., light house work, office work [Normal] : affect appropriate [de-identified] : s/p bilat mastectomies with implants , no axillary adenopathy [de-identified] : slightly distended with mild ascites  [de-identified] : anterior sternal mass non tender  [de-identified] : no rashes

## 2022-12-19 NOTE — ASSESSMENT
[FreeTextEntry1] : 51 y/o female with  alcoholic liver cirrhosis ( no longer drinking - in AA) pancytopenia, sternal mass and hx of left breast cancer in 2018 s/p b/l mastectomies  ( no adjuvant chemo, no hormonal tx, details / stage not available).\par \par CT scans chest abd and pelvis done in hospital except for destructive sternal  mass showed no other sites of metastatic disease.\par \par Pancytopenia- at least partially due to liver disease/ cirrhosis but other etiologies also need to be considered.\par No iron deficiency. Mild degree of hemolysis - common in liver disease.\par Will obtain retic count, myeloma w/up.\par \par H/H stable. Mildly symptomatic only - no urgent indication for transfusion.\par \par Hx of breast cancer- metastatic disease a possibility , versus other malignant etiologies including plasma cell myeloma/ plasmacytoma.\par \par Elevated INR due to coagulopathy 2/2 liver disease.\par \par Plan :\par biopsy of sternal mass by IR. If elevated INR a concern- will need 2 units FFP prior to biopsy.\par \par Return visit in 2-3 weeks.\par \par D/w patient in details.

## 2022-12-19 NOTE — REVIEW OF SYSTEMS
[Patient Intake Form Reviewed] : Patient intake form was reviewed [Fatigue] : fatigue [Recent Change In Weight] : ~T recent weight change [Negative] : Endocrine [FreeTextEntry6] : better  [FreeTextEntry7] : per HPI  [FreeTextEntry9] : per HPI

## 2022-12-19 NOTE — HISTORY OF PRESENT ILLNESS
[de-identified] : MITESH Driscoll a 52 y.o. F with a PMH significant for Breast Ca in 2018 s/p mastectomy (Dr. Parson, no chemo/RT), hx of Cocaine use (quit in 1999), former smoker (quit in 2015), alcohol abuse disorder (no history of seizures/delirium tremens.), cirrhosis with esophageal varices, and COVID-19 10/2022,   who we are following for cytopenias and concern for metastatic breast cancer.   \par \par 11/28/22 - Presented to  ER with SOB, cough , abdominal distention, and 16 lbs of weight loss. \par \par WBC: 9.62,  Hgb: 8.6,  Hct: 24.8,  MCV: 111.2,  Plts: 100.\par PT: 20.1, PTT: 37.5, INR: 1.72\par  \par CTA Chest - negative for PE. Small L pleural effusion.\par CT A/P - Advanced cirrhosis with extensive esophageal and paraesophageal varices. Enlarged spleen measuring 15.6cm.  Moderate volume ascites. \par Diffuse lytic change and areas of cortical destruction involving the sternum suspicious for metastatic disease.\par \par 11/29/22 - Diagnostic paracentesis - consistent with portal HTN and no evidence of SBP.  Cytology was negative for malignant cells. \par EGD -  trace esophageal varices, portal gastropathy, gastric polyps - H. Pylori positive.\par Colonoscopy showed internal hemorrhoids.\par \par Started on  Lasix 40mg PO and spironolactone 100mg PO for management of ascites.  \par Biopsy of sternum not done due to INR not meeting below 1.5 per IR. \par \par 12/1/22 - WBC: 7.42,  Hgb: 7.9,  Hct: 23.4,  MCV: 112.0,  Plts: 106.\par Total bili was 8.1 on admission, 4.6 at discharge. \par B12: 909,  Folate: 12.7,  Ferritin: 784,  TSAT: 52%,  Albumin: 1.8,  Haptoglobin: 22,  LDH: 217. \par \par Feels OK. 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 Chronic fatigue - no recent drastic change. Lost about 25 lbs in 6 months- but states that she is eating OK. \par Noticed a " bump" in her sternum x several months. No pain.\par \par \par has Hx of breast cancer in 2018 left breast ( Dr Parson) Opted for bilat mastectomies. States that lymph nodes were clear. Not sure what stage breast cancer. No adjuvant chemo, hormonal tx , no RT.\par

## 2022-12-20 LAB
CANCER AG27-29 SERPL-ACNC: 40 U/ML — HIGH (ref 0–38.6)
IGA FLD-MCNC: 1117 MG/DL — HIGH (ref 84–499)
IGG FLD-MCNC: 2285 MG/DL — HIGH (ref 610–1660)
IGM SERPL-MCNC: 278 MG/DL — HIGH (ref 35–242)
KAPPA LC SER QL IFE: 9.77 MG/DL — HIGH (ref 0.33–1.94)
KAPPA LC SER QL IFE: 9.77 MG/DL — HIGH (ref 0.33–1.94)
KAPPA/LAMBDA FREE LIGHT CHAIN RATIO, SERUM: 1.75 RATIO — HIGH (ref 0.26–1.65)
KAPPA/LAMBDA FREE LIGHT CHAIN RATIO, SERUM: 1.75 RATIO — HIGH (ref 0.26–1.65)
LAMBDA LC SER QL IFE: 5.57 MG/DL — HIGH (ref 0.57–2.63)
LAMBDA LC SER QL IFE: 5.57 MG/DL — HIGH (ref 0.57–2.63)
PROT SERPL-MCNC: 8 G/DL — SIGNIFICANT CHANGE UP (ref 6–8.3)
PROT SERPL-MCNC: 8 G/DL — SIGNIFICANT CHANGE UP (ref 6–8.3)

## 2022-12-25 ENCOUNTER — NON-APPOINTMENT (OUTPATIENT)
Age: 52
End: 2022-12-25

## 2022-12-27 ENCOUNTER — RESULT REVIEW (OUTPATIENT)
Age: 52
End: 2022-12-27

## 2022-12-27 ENCOUNTER — OUTPATIENT (OUTPATIENT)
Dept: INPATIENT UNIT | Facility: HOSPITAL | Age: 52
LOS: 1 days | Discharge: HOME | End: 2022-12-27
Payer: COMMERCIAL

## 2022-12-27 ENCOUNTER — TRANSCRIPTION ENCOUNTER (OUTPATIENT)
Age: 52
End: 2022-12-27

## 2022-12-27 VITALS
HEIGHT: 68 IN | SYSTOLIC BLOOD PRESSURE: 120 MMHG | TEMPERATURE: 97 F | DIASTOLIC BLOOD PRESSURE: 68 MMHG | OXYGEN SATURATION: 100 % | RESPIRATION RATE: 14 BRPM | WEIGHT: 126.1 LBS | HEART RATE: 93 BPM

## 2022-12-27 VITALS
SYSTOLIC BLOOD PRESSURE: 99 MMHG | OXYGEN SATURATION: 100 % | HEART RATE: 95 BPM | DIASTOLIC BLOOD PRESSURE: 61 MMHG | RESPIRATION RATE: 14 BRPM

## 2022-12-27 DIAGNOSIS — M89.8X8 OTHER SPECIFIED DISORDERS OF BONE, OTHER SITE: ICD-10-CM

## 2022-12-27 DIAGNOSIS — Z98.891 HISTORY OF UTERINE SCAR FROM PREVIOUS SURGERY: Chronic | ICD-10-CM

## 2022-12-27 DIAGNOSIS — D61.818 OTHER PANCYTOPENIA: ICD-10-CM

## 2022-12-27 DIAGNOSIS — Z90.13 ACQUIRED ABSENCE OF BILATERAL BREASTS AND NIPPLES: Chronic | ICD-10-CM

## 2022-12-27 LAB
% ALBUMIN: 42.7 % — SIGNIFICANT CHANGE UP
% ALPHA 1: 3.7 % — SIGNIFICANT CHANGE UP
% ALPHA 2: 5.2 % — SIGNIFICANT CHANGE UP
% BETA: 14.8 % — SIGNIFICANT CHANGE UP
% GAMMA: 33.6 % — SIGNIFICANT CHANGE UP
ALBUMIN SERPL ELPH-MCNC: 3.4 G/DL — LOW (ref 3.6–5.5)
ALBUMIN/GLOB SERPL ELPH: 0.7 RATIO — SIGNIFICANT CHANGE UP
ALPHA1 GLOB SERPL ELPH-MCNC: 0.3 G/DL — SIGNIFICANT CHANGE UP (ref 0.1–0.4)
ALPHA2 GLOB SERPL ELPH-MCNC: 0.4 G/DL — LOW (ref 0.5–1)
ANION GAP SERPL CALC-SCNC: 7 MMOL/L — SIGNIFICANT CHANGE UP (ref 5–17)
B-GLOBULIN SERPL ELPH-MCNC: 1.2 G/DL — HIGH (ref 0.5–1)
BLD GP AB SCN SERPL QL: SIGNIFICANT CHANGE UP
BUN SERPL-MCNC: 13 MG/DL — SIGNIFICANT CHANGE UP (ref 7–23)
CALCIUM SERPL-MCNC: 10.2 MG/DL — HIGH (ref 8.5–10.1)
CHLORIDE SERPL-SCNC: 102 MMOL/L — SIGNIFICANT CHANGE UP (ref 96–108)
CO2 SERPL-SCNC: 27 MMOL/L — SIGNIFICANT CHANGE UP (ref 22–31)
CREAT SERPL-MCNC: 0.81 MG/DL — SIGNIFICANT CHANGE UP (ref 0.5–1.3)
EGFR: 88 ML/MIN/1.73M2 — SIGNIFICANT CHANGE UP
GAMMA GLOBULIN: 2.7 G/DL — HIGH (ref 0.6–1.6)
GLUCOSE SERPL-MCNC: 131 MG/DL — HIGH (ref 70–99)
HCG UR QL: NEGATIVE — SIGNIFICANT CHANGE UP
HCT VFR BLD CALC: 25.1 % — LOW (ref 34.5–45)
HGB BLD-MCNC: 8.7 G/DL — LOW (ref 11.5–15.5)
INR BLD: 1.6 RATIO — HIGH (ref 0.88–1.16)
INTERPRETATION SERPL IFE-IMP: SIGNIFICANT CHANGE UP
MCHC RBC-ENTMCNC: 34.7 GM/DL — SIGNIFICANT CHANGE UP (ref 32–36)
MCHC RBC-ENTMCNC: 37.5 PG — HIGH (ref 27–34)
MCV RBC AUTO: 108.2 FL — HIGH (ref 80–100)
PLATELET # BLD AUTO: 108 K/UL — LOW (ref 150–400)
POTASSIUM SERPL-MCNC: 4 MMOL/L — SIGNIFICANT CHANGE UP (ref 3.5–5.3)
POTASSIUM SERPL-SCNC: 4 MMOL/L — SIGNIFICANT CHANGE UP (ref 3.5–5.3)
PROT PATTERN SERPL ELPH-IMP: SIGNIFICANT CHANGE UP
PROTHROM AB SERPL-ACNC: 18.7 SEC — HIGH (ref 10.5–13.4)
RBC # BLD: 2.32 M/UL — LOW (ref 3.8–5.2)
RBC # FLD: 13.4 % — SIGNIFICANT CHANGE UP (ref 10.3–14.5)
SODIUM SERPL-SCNC: 136 MMOL/L — SIGNIFICANT CHANGE UP (ref 135–145)
WBC # BLD: 4.67 K/UL — SIGNIFICANT CHANGE UP (ref 3.8–10.5)
WBC # FLD AUTO: 4.67 K/UL — SIGNIFICANT CHANGE UP (ref 3.8–10.5)

## 2022-12-27 PROCEDURE — 86850 RBC ANTIBODY SCREEN: CPT

## 2022-12-27 PROCEDURE — 77012 CT SCAN FOR NEEDLE BIOPSY: CPT | Mod: 26

## 2022-12-27 PROCEDURE — 88305 TISSUE EXAM BY PATHOLOGIST: CPT | Mod: 26

## 2022-12-27 PROCEDURE — P9059: CPT

## 2022-12-27 PROCEDURE — 81025 URINE PREGNANCY TEST: CPT

## 2022-12-27 PROCEDURE — 77012 CT SCAN FOR NEEDLE BIOPSY: CPT

## 2022-12-27 PROCEDURE — 88342 IMHCHEM/IMCYTCHM 1ST ANTB: CPT

## 2022-12-27 PROCEDURE — 93010 ELECTROCARDIOGRAM REPORT: CPT

## 2022-12-27 PROCEDURE — 88305 TISSUE EXAM BY PATHOLOGIST: CPT

## 2022-12-27 PROCEDURE — 85027 COMPLETE CBC AUTOMATED: CPT

## 2022-12-27 PROCEDURE — 86900 BLOOD TYPING SEROLOGIC ABO: CPT

## 2022-12-27 PROCEDURE — 36415 COLL VENOUS BLD VENIPUNCTURE: CPT

## 2022-12-27 PROCEDURE — 88141 CYTOPATH C/V INTERPRET: CPT

## 2022-12-27 PROCEDURE — 85610 PROTHROMBIN TIME: CPT

## 2022-12-27 PROCEDURE — 86901 BLOOD TYPING SEROLOGIC RH(D): CPT

## 2022-12-27 PROCEDURE — 88341 IMHCHEM/IMCYTCHM EA ADD ANTB: CPT

## 2022-12-27 PROCEDURE — 88342 IMHCHEM/IMCYTCHM 1ST ANTB: CPT | Mod: 26

## 2022-12-27 PROCEDURE — 20220 BONE BIOPSY TROCAR/NDL SUPFC: CPT

## 2022-12-27 PROCEDURE — 80048 BASIC METABOLIC PNL TOTAL CA: CPT

## 2022-12-27 PROCEDURE — 88341 IMHCHEM/IMCYTCHM EA ADD ANTB: CPT | Mod: 26

## 2022-12-27 PROCEDURE — 93005 ELECTROCARDIOGRAM TRACING: CPT

## 2022-12-27 RX ORDER — ACETAMINOPHEN 500 MG
650 TABLET ORAL EVERY 6 HOURS
Refills: 0 | Status: DISCONTINUED | OUTPATIENT
Start: 2022-12-27 | End: 2022-12-27

## 2022-12-27 RX ORDER — ONDANSETRON 8 MG/1
8 TABLET, FILM COATED ORAL ONCE
Refills: 0 | Status: DISCONTINUED | OUTPATIENT
Start: 2022-12-27 | End: 2022-12-27

## 2022-12-27 RX ORDER — SODIUM CHLORIDE 9 MG/ML
1000 INJECTION, SOLUTION INTRAVENOUS
Refills: 0 | Status: DISCONTINUED | OUTPATIENT
Start: 2022-12-27 | End: 2022-12-27

## 2022-12-27 RX ORDER — OXYCODONE AND ACETAMINOPHEN 5; 325 MG/1; MG/1
1 TABLET ORAL ONCE
Refills: 0 | Status: DISCONTINUED | OUTPATIENT
Start: 2022-12-27 | End: 2022-12-27

## 2022-12-27 NOTE — CHART NOTE - NSCHARTNOTEFT_GEN_A_CORE
Vascular & Interventional Radiology Pre-Procedure Note    Procedure Name: CT guided sternum biopsy    HPI: 52y Female with pancytopenia and lytic sternal lesions    51 y/o female with alcoholic liver cirrhosis ( no longer drinking - in AA) pancytopenia, sternal mass and hx of left breast cancer in 2018 s/p b/l mastectomies ( no adjuvant chemo, no hormonal tx, details / stage not available). Now for sternal biopsy.    PMH/PSH  Other pancytopenia  Chronic alcohol abuse  History of cocaine use  Former smoker  Breast cancer  2019 novel coronavirus disease (COVID-19)  Cirrhosis  History of Multiple nevi (V13.89) (Z86.018)  History of neoplasm of uncertain behavior (V13.89) (Z86.03)  History of palpitations (V12.59) (Z87.898)  History of panic attacks (V11.8) (Z86.59)  History of varicose veins of lower extremity (V12.59) (Z86.79)  History of Skin tag (701.9) (L91.8)    History of bilateral mastectomy  History of       Allergies: No Known Allergies      Medications (Abx/Cardiac/Anticoagulation/Blood Products)  None    Data:  172.7  57.2    T(C): 36.4  HR: 79  BP: 104/63  RR: 16  SpO2: 100%      -WBC 4.67 / HgB 8.7 / Hct 25.1 / Plt 108  -Na 136 / Cl 102 / BUN 13 / Glucose 131  -K 4.0 / CO2 27 / Cr 0.81  -ALT -- / Alk Phos -- / T.Bili --  -INR1.60      Imaging: lytic sternal lesions    Plan:   -52y Female presents for CT guided sternal biopsy

## 2022-12-27 NOTE — ASU DISCHARGE PLAN (ADULT/PEDIATRIC) - NS MD DC FALL RISK RISK
For information on Fall & Injury Prevention, visit: [email or web address] OR  [email or web address] OR  https://www.cdc.gov/steadi/patient.html

## 2022-12-27 NOTE — BRIEF OPERATIVE NOTE - ASSISTANT(S)
Please review/sign referral.     Coumadin nurse is requesting we instruct patient on stopping xarelto and starting coumadin.  Will be seen on 4/18/18.     Please advise.    none

## 2023-01-10 ENCOUNTER — RESULT REVIEW (OUTPATIENT)
Age: 53
End: 2023-01-10

## 2023-01-10 ENCOUNTER — APPOINTMENT (OUTPATIENT)
Dept: HEMATOLOGY ONCOLOGY | Facility: CLINIC | Age: 53
End: 2023-01-10
Payer: COMMERCIAL

## 2023-01-10 VITALS
HEART RATE: 89 BPM | RESPIRATION RATE: 18 BRPM | SYSTOLIC BLOOD PRESSURE: 101 MMHG | TEMPERATURE: 98.6 F | OXYGEN SATURATION: 100 % | DIASTOLIC BLOOD PRESSURE: 65 MMHG

## 2023-01-10 DIAGNOSIS — C50.919 MALIGNANT NEOPLASM OF UNSPECIFIED SITE OF UNSPECIFIED FEMALE BREAST: ICD-10-CM

## 2023-01-10 LAB
BASOPHILS # BLD AUTO: 0.04 K/UL — SIGNIFICANT CHANGE UP (ref 0–0.2)
BASOPHILS NFR BLD AUTO: 0.8 % — SIGNIFICANT CHANGE UP (ref 0–2)
EOSINOPHIL # BLD AUTO: 0.1 K/UL — SIGNIFICANT CHANGE UP (ref 0–0.5)
EOSINOPHIL NFR BLD AUTO: 2 % — SIGNIFICANT CHANGE UP (ref 0–6)
HCT VFR BLD CALC: 22.2 % — LOW (ref 34.5–45)
HGB BLD-MCNC: 8 G/DL — LOW (ref 11.5–15.5)
IMM GRANULOCYTES NFR BLD AUTO: 0.4 % — SIGNIFICANT CHANGE UP (ref 0–0.9)
LYMPHOCYTES # BLD AUTO: 1.5 K/UL — SIGNIFICANT CHANGE UP (ref 1–3.3)
LYMPHOCYTES # BLD AUTO: 29.5 % — SIGNIFICANT CHANGE UP (ref 13–44)
MCHC RBC-ENTMCNC: 36 GM/DL — SIGNIFICANT CHANGE UP (ref 32–36)
MCHC RBC-ENTMCNC: 36.7 PG — HIGH (ref 27–34)
MCV RBC AUTO: 101.8 FL — HIGH (ref 80–100)
MONOCYTES # BLD AUTO: 0.48 K/UL — SIGNIFICANT CHANGE UP (ref 0–0.9)
MONOCYTES NFR BLD AUTO: 9.4 % — SIGNIFICANT CHANGE UP (ref 2–14)
NEUTROPHILS # BLD AUTO: 2.95 K/UL — SIGNIFICANT CHANGE UP (ref 1.8–7.4)
NEUTROPHILS NFR BLD AUTO: 57.9 % — SIGNIFICANT CHANGE UP (ref 43–77)
NRBC # BLD: 0 /100 WBCS — SIGNIFICANT CHANGE UP (ref 0–0)
PLATELET # BLD AUTO: 86 K/UL — LOW (ref 150–400)
RBC # BLD: 2.18 M/UL — LOW (ref 3.8–5.2)
RBC # FLD: 12.1 % — SIGNIFICANT CHANGE UP (ref 10.3–14.5)
WBC # BLD: 5.09 K/UL — SIGNIFICANT CHANGE UP (ref 3.8–10.5)
WBC # FLD AUTO: 5.09 K/UL — SIGNIFICANT CHANGE UP (ref 3.8–10.5)

## 2023-01-10 PROCEDURE — 99215 OFFICE O/P EST HI 40 MIN: CPT

## 2023-01-10 NOTE — HISTORY OF PRESENT ILLNESS
[de-identified] : MITESH Driscoll a 52 y.o. F with a PMH significant for Breast Ca in 2018 s/p mastectomy (Dr. Parson, no chemo/RT), hx of Cocaine use (quit in 1999), former smoker (quit in 2015), alcohol abuse disorder (no history of seizures/delirium tremens.), cirrhosis with esophageal varices, and COVID-19 10/2022,   who we are following for cytopenias and concern for metastatic breast cancer.   \par \par 11/28/22 - Presented to  ER with SOB, cough , abdominal distention, and 16 lbs of weight loss. \par \par WBC: 9.62,  Hgb: 8.6,  Hct: 24.8,  MCV: 111.2,  Plts: 100.\par PT: 20.1, PTT: 37.5, INR: 1.72\par  \par CTA Chest - negative for PE. Small L pleural effusion.\par CT A/P - Advanced cirrhosis with extensive esophageal and paraesophageal varices. Enlarged spleen measuring 15.6cm.  Moderate volume ascites. \par Diffuse lytic change and areas of cortical destruction involving the sternum suspicious for metastatic disease.\par \par 11/29/22 - Diagnostic paracentesis - consistent with portal HTN and no evidence of SBP.  Cytology was negative for malignant cells. \par EGD -  trace esophageal varices, portal gastropathy, gastric polyps - H. Pylori positive.\par Colonoscopy showed internal hemorrhoids.\par \par Started on  Lasix 40mg PO and spironolactone 100mg PO for management of ascites.  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 Chronic fatigue - no recent drastic change. Lost about 25 lbs in 6 months- but states that she is eating OK. \par Noticed a " bump" in her sternum x several months. No pain.\par \par has Hx of breast cancer in 2018 left breast ( Dr Parson) Opted for bilat mastectomies. States that lymph nodes were clear. Not sure what stage breast cancer. No adjuvant chemo, hormonal tx , no RT.\par \par Biopsy of sternal mass 12/27/22 metastatic adenocarcinoma c/w breast primary  ER positve strong, [place] negative  HER2 1-2 +  FISH negative.\par  [de-identified] : Feels OK . No bone pains. 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

## 2023-01-10 NOTE — ASSESSMENT
[FreeTextEntry1] : 51 y/o female with  alcoholic liver cirrhosis ( no longer drinking - in AA) pancytopenia/ anmeia , sternal mass and hx of left breast cancer in 2018 s/p b/l mastectomies  ( no adjuvant chemo, no hormonal tx, details / stage not available).\par \par CT scans chest abd and pelvis done in hospital except for destructive sternal  mass showed no other sites of metastatic disease. ? bone mets on scans\par \par Biopsy of sternal mass c/w recurrent breast cancer ER positive CT negative HER2 negative.\par \par Long discussion re diagnosis, treatment options. Discussed hormonal therapy/ AI, CDK4/6 inhibitors , bone targeted therapy. Limited role for chemotherapy in initial tx of metasttaic HR positive breast cancer.\par \par Will obtain PET scam for extend of disease/ especially extend of active metastatic bone disease.\par \par No menses for almost one year. Will obtain estradiol, FSH and LH to confirm postmenopausal state. \par \par Dental clearance for  denosumab.\par \par \par Pancytopenia- at least partially due to liver disease/ cirrhosis. Anemia of chronic disease in setting of malignancy. \par No iron deficiency. Mild degree of hemolysis - common in liver disease.\par \par Return visit in 7-10 days  ( after PET scan) to discuss details of tx

## 2023-01-10 NOTE — REVIEW OF SYSTEMS
[Patient Intake Form Reviewed] : Patient intake form was reviewed [Fatigue] : fatigue [Negative] : Endocrine [Recent Change In Weight] : ~T no recent weight change [FreeTextEntry6] : better  [FreeTextEntry7] : per HPI

## 2023-01-10 NOTE — PHYSICAL EXAM
[Restricted in physically strenuous activity but ambulatory and able to carry out work of a light or sedentary nature] : Status 1- Restricted in physically strenuous activity but ambulatory and able to carry out work of a light or sedentary nature, e.g., light house work, office work [Normal] : affect appropriate [de-identified] : s/p bilat mastectomies with implants , no axillary adenopathy [de-identified] : slightly distended with mild ascites  [de-identified] : anterior sternal mass non tender  [de-identified] : no rashes

## 2023-01-11 LAB
ALBUMIN SERPL ELPH-MCNC: 3.9 G/DL — SIGNIFICANT CHANGE UP (ref 3.3–5)
ALP SERPL-CCNC: 70 U/L — SIGNIFICANT CHANGE UP (ref 40–120)
ALT FLD-CCNC: 28 U/L — SIGNIFICANT CHANGE UP (ref 10–45)
ANION GAP SERPL CALC-SCNC: 13 MMOL/L — SIGNIFICANT CHANGE UP (ref 5–17)
AST SERPL-CCNC: 61 U/L — HIGH (ref 10–40)
BILIRUB SERPL-MCNC: 3.1 MG/DL — HIGH (ref 0.2–1.2)
BUN SERPL-MCNC: 12 MG/DL — SIGNIFICANT CHANGE UP (ref 7–23)
CALCIUM SERPL-MCNC: 9.8 MG/DL — SIGNIFICANT CHANGE UP (ref 8.4–10.5)
CHLORIDE SERPL-SCNC: 99 MMOL/L — SIGNIFICANT CHANGE UP (ref 96–108)
CO2 SERPL-SCNC: 25 MMOL/L — SIGNIFICANT CHANGE UP (ref 22–31)
CREAT SERPL-MCNC: 0.54 MG/DL — SIGNIFICANT CHANGE UP (ref 0.5–1.3)
EGFR: 112 ML/MIN/1.73M2 — SIGNIFICANT CHANGE UP
ESTRADIOL FREE SERPL-MCNC: 15 PG/ML — SIGNIFICANT CHANGE UP
FSH SERPL-MCNC: 38.4 IU/L — SIGNIFICANT CHANGE UP
GLUCOSE SERPL-MCNC: 108 MG/DL — HIGH (ref 70–99)
LH SERPL-ACNC: 17.4 IU/L — SIGNIFICANT CHANGE UP
POTASSIUM SERPL-MCNC: 3.9 MMOL/L — SIGNIFICANT CHANGE UP (ref 3.5–5.3)
POTASSIUM SERPL-SCNC: 3.9 MMOL/L — SIGNIFICANT CHANGE UP (ref 3.5–5.3)
PROT SERPL-MCNC: 7.8 G/DL — SIGNIFICANT CHANGE UP (ref 6–8.3)
SODIUM SERPL-SCNC: 137 MMOL/L — SIGNIFICANT CHANGE UP (ref 135–145)

## 2023-01-17 ENCOUNTER — OUTPATIENT (OUTPATIENT)
Dept: OUTPATIENT SERVICES | Facility: HOSPITAL | Age: 53
LOS: 1 days | End: 2023-01-17

## 2023-01-17 ENCOUNTER — APPOINTMENT (OUTPATIENT)
Dept: NUCLEAR MEDICINE | Facility: CLINIC | Age: 53
End: 2023-01-17
Payer: COMMERCIAL

## 2023-01-17 DIAGNOSIS — Z90.13 ACQUIRED ABSENCE OF BILATERAL BREASTS AND NIPPLES: Chronic | ICD-10-CM

## 2023-01-17 DIAGNOSIS — Z98.891 HISTORY OF UTERINE SCAR FROM PREVIOUS SURGERY: Chronic | ICD-10-CM

## 2023-01-17 DIAGNOSIS — C50.919 MALIGNANT NEOPLASM OF UNSPECIFIED SITE OF UNSPECIFIED FEMALE BREAST: ICD-10-CM

## 2023-01-17 PROCEDURE — 78815 PET IMAGE W/CT SKULL-THIGH: CPT | Mod: 26,PI

## 2023-01-20 ENCOUNTER — APPOINTMENT (OUTPATIENT)
Dept: HEMATOLOGY ONCOLOGY | Facility: CLINIC | Age: 53
End: 2023-01-20
Payer: COMMERCIAL

## 2023-01-20 VITALS
DIASTOLIC BLOOD PRESSURE: 61 MMHG | BODY MASS INDEX: 20.74 KG/M2 | RESPIRATION RATE: 17 BRPM | TEMPERATURE: 98.2 F | WEIGHT: 136.38 LBS | SYSTOLIC BLOOD PRESSURE: 98 MMHG | HEART RATE: 80 BPM | OXYGEN SATURATION: 98 %

## 2023-01-20 PROCEDURE — 99215 OFFICE O/P EST HI 40 MIN: CPT

## 2023-01-20 RX ORDER — CHROMIUM 200 MCG
TABLET ORAL
Refills: 0 | Status: ACTIVE | COMMUNITY

## 2023-01-20 NOTE — REVIEW OF SYSTEMS
[Patient Intake Form Reviewed] : Patient intake form was reviewed [Negative] : Gastrointestinal [Fatigue] : no fatigue [Recent Change In Weight] : ~T no recent weight change no

## 2023-01-20 NOTE — PHYSICAL EXAM
[Restricted in physically strenuous activity but ambulatory and able to carry out work of a light or sedentary nature] : Status 1- Restricted in physically strenuous activity but ambulatory and able to carry out work of a light or sedentary nature, e.g., light house work, office work [Normal] : affect appropriate [de-identified] : looks well  [de-identified] : s/p bilat mastectomies with implants , no axillary adenopathy [de-identified] : slightly distended  [de-identified] : anterior sternal mass non tender  [de-identified] : no rashes

## 2023-01-20 NOTE — ASSESSMENT
[FreeTextEntry1] : 51 y/o female with alcoholic liver cirrhosis ( no longer drinking- In AA) ; hx of bilat mastectomies 2018 for left breast cancer ( no details available, no RT chemo or hormonal therapy) .\par Now with breast  cancer recurrence - destructive sternal mass and likely L 5 lesion; ER positive, [place] negative and HER2 negative. \par Oligometastatic disease.\par \par Will start hormonal therapy. \par Postmenopausal - no menses for ~ one year, blood work c/w postmenopausal state.\par Rx letrozole 2.5 mg daily. Explained side effects.\par \par Will discuss  with Rad Onc if she is a candidate for RT to sites of oligometastatic disease. If RT possible- would continue AI only. If no RT option- consider adding CDK4/6 inhibitor but will need to monitor for toxicity in setting of advanced liver disease.\par \par Return visit in 6 weeks. \par

## 2023-01-20 NOTE — HISTORY OF PRESENT ILLNESS
[de-identified] : MITESH Driscoll a 52 y.o. F with a PMH significant for Breast Ca in 2018 s/p mastectomy (Dr. Parson, no chemo/RT), hx of Cocaine use (quit in 1999), former smoker (quit in 2015), alcohol abuse disorder (no history of seizures/delirium tremens.), cirrhosis with esophageal varices, and COVID-19 10/2022,   who we are following for cytopenias and concern for metastatic breast cancer.   \par \par 11/28/22 - Presented to  ER with SOB, cough , abdominal distention, and 16 lbs of weight loss. \par \par WBC: 9.62,  Hgb: 8.6,  Hct: 24.8,  MCV: 111.2,  Plts: 100.\par PT: 20.1, PTT: 37.5, INR: 1.72\par  \par CTA Chest - negative for PE. Small L pleural effusion.\par CT A/P - Advanced cirrhosis with extensive esophageal and paraesophageal varices. Enlarged spleen measuring 15.6cm.  Moderate volume ascites. \par Diffuse lytic change and areas of cortical destruction involving the sternum suspicious for metastatic disease.\par \par 11/29/22 - Diagnostic paracentesis - consistent with portal HTN and no evidence of SBP.  Cytology was negative for malignant cells. \par EGD -  trace esophageal varices, portal gastropathy, gastric polyps - H. Pylori positive.\par Colonoscopy showed internal hemorrhoids.\par On  Lasix 40mg PO and spironolactone 100mg PO for management of ascites.  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 Chronic fatigue - no recent drastic change. Lost about 25 lbs in 6 months- but states that she is eating OK. \par Noticed a " bump" in her sternum x several months. No pain.\par \par has Hx of breast cancer in 2018 left breast ( Dr Parson) Opted for bilat mastectomies. States that lymph nodes were clear. Not sure what stage breast cancer. No adjuvant chemo, hormonal tx , no RT.\par \par Biopsy of sternal mass 12/27/22 metastatic adenocarcinoma c/w breast primary  ER positive strong, [place] negative  HER2 1-2 +  FISH negative.\par \par PET 1/17/23  destructive avid sternal mass. L5 with focal avidity and lucent changes- suspect met. Resolution of L pleural effusion, marked improvement in ascites . No abnormal FDG uptake in abd pelvis \par  [de-identified] : Feels OK . No bone pains. 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

## 2023-01-26 ENCOUNTER — APPOINTMENT (OUTPATIENT)
Dept: RADIATION ONCOLOGY | Facility: CLINIC | Age: 53
End: 2023-01-26
Payer: COMMERCIAL

## 2023-01-26 ENCOUNTER — NON-APPOINTMENT (OUTPATIENT)
Age: 53
End: 2023-01-26

## 2023-01-26 VITALS
DIASTOLIC BLOOD PRESSURE: 62 MMHG | RESPIRATION RATE: 18 BRPM | BODY MASS INDEX: 19.85 KG/M2 | OXYGEN SATURATION: 99 % | SYSTOLIC BLOOD PRESSURE: 108 MMHG | HEIGHT: 68 IN | HEART RATE: 82 BPM | WEIGHT: 131 LBS

## 2023-01-26 DIAGNOSIS — Z87.891 PERSONAL HISTORY OF NICOTINE DEPENDENCE: ICD-10-CM

## 2023-01-26 DIAGNOSIS — Z80.3 FAMILY HISTORY OF MALIGNANT NEOPLASM OF BREAST: ICD-10-CM

## 2023-01-26 PROCEDURE — 99204 OFFICE O/P NEW MOD 45 MIN: CPT | Mod: 25

## 2023-01-26 PROCEDURE — 77263 THER RADIOLOGY TX PLNG CPLX: CPT

## 2023-01-26 RX ORDER — MULTIVITAMIN
CAPSULE ORAL
Refills: 0 | Status: ACTIVE | COMMUNITY

## 2023-01-26 NOTE — HISTORY OF PRESENT ILLNESS
[FreeTextEntry1] : This is a 52 year old female diagnosed with metastatic breast cancer referred by Dr. Hughes. \par \par She was first diagnosed with node-negative left breast cancer in 2018 and underwent bilateral mastectomies with Dr. Parson. No chemo, hormonal therapy, or radiation given. She has a history of cocaine abuse (quit in 1999), alcohol abuse (quit and is in AA), and cirrhosis with esophageal varices. \par \par She presented to  in November 2022 with SOB, coughing, abdominal distention, and a 16 pound weight loss. \par \par CT A/P and CT angio chest performed on 11/28/22 showed no PE. Small left pleural effusion. Cirrhosis, splenomegaly, portal hypertension and moderate ascites. Esophageal varices. Diffuse lytic change of the sternum suspicious for metastatic disease. \par \par Diagnostic paracentesis performed on 11/29/22 was negative for malignant cells. \par \par Colonoscopy/EGD performed on 12/1/22 was negative. \par \par Sternal mass biopsy performed on 12/27/22 showed metastatic adenocarcinoma consistent with patient's known breast primary. ER positive, [place] negative, HER2 negative by FISH. \par \par PET scan performed on 1/17/23 showed no evidence of locally recurrent FDG avid breast malignancy. Destructive lesion in the sternum with increased activity compatible with biopsied osseous metastasis. L5 with lucent changes and focal activity where additional site of disease involvement is suspected. Interval resolution of left pleural effusion and marked improvement in abdominopelvic ascites from 11/2022 CT. Mild splenomegaly with physiologic degree of activity.\par \par She is to begin Letrozole and denosumab with Dr. Hughes. Denies chest pain or low back pain. States she has lost a total of 25 pounds over the past one year. She presents to discuss the role of radiation therapy in her care.

## 2023-01-26 NOTE — DISEASE MANAGEMENT
[Clinical] : TNM Stage: c [N/A] : Currently not applicable [FreeTextEntry4] : left breast cancer, recurrent [TTNM] : - [NTNM] : - [MTNM] : -

## 2023-01-26 NOTE — PHYSICAL EXAM
[Normal] : oriented to person, place and time, the affect was normal, the mood was normal and not anxious [de-identified] : Bilateral mastecomy with implant rconstruction

## 2023-01-26 NOTE — VITALS
[Date: ____________] : Patient's last distress assessment performed on [unfilled]. [1 - Distress Level] : Distress Level: 1 [Maximal Pain Intensity: 0/10] : 0/10 [90: Able to carry normal activity; minor signs or symptoms of disease.] : 90: Able to carry normal activity; minor signs or symptoms of disease.

## 2023-01-26 NOTE — OB/GYN HISTORY
[Menopause Age: ____] : patient was [unfilled] years old at menopause [History of Birth Control Pills] : Patient has a history of taking birth control pills [___] : Full Term: [unfilled] [History of Hormone Replacement Therapy] : no history of hormone replacement therapy

## 2023-01-27 PROCEDURE — 77333 RADIATION TREATMENT AID(S): CPT

## 2023-01-27 PROCEDURE — 77290 THER RAD SIMULAJ FIELD CPLX: CPT

## 2023-01-31 ENCOUNTER — RESULT REVIEW (OUTPATIENT)
Age: 53
End: 2023-01-31

## 2023-02-03 PROCEDURE — 77370 RADIATION PHYSICS CONSULT: CPT

## 2023-02-06 PROCEDURE — 77300 RADIATION THERAPY DOSE PLAN: CPT

## 2023-02-06 PROCEDURE — 77295 3-D RADIOTHERAPY PLAN: CPT

## 2023-02-06 PROCEDURE — 77334 RADIATION TREATMENT AID(S): CPT

## 2023-02-14 ENCOUNTER — APPOINTMENT (OUTPATIENT)
Dept: MRI IMAGING | Facility: CLINIC | Age: 53
End: 2023-02-14
Payer: COMMERCIAL

## 2023-02-14 ENCOUNTER — OUTPATIENT (OUTPATIENT)
Dept: OUTPATIENT SERVICES | Facility: HOSPITAL | Age: 53
LOS: 1 days | End: 2023-02-14
Payer: COMMERCIAL

## 2023-02-14 DIAGNOSIS — C50.919 MALIGNANT NEOPLASM OF UNSPECIFIED SITE OF UNSPECIFIED FEMALE BREAST: ICD-10-CM

## 2023-02-14 DIAGNOSIS — Z98.891 HISTORY OF UTERINE SCAR FROM PREVIOUS SURGERY: Chronic | ICD-10-CM

## 2023-02-14 DIAGNOSIS — Z90.13 ACQUIRED ABSENCE OF BILATERAL BREASTS AND NIPPLES: Chronic | ICD-10-CM

## 2023-02-14 PROCEDURE — 72158 MRI LUMBAR SPINE W/O & W/DYE: CPT | Mod: 26

## 2023-02-14 PROCEDURE — 72158 MRI LUMBAR SPINE W/O & W/DYE: CPT

## 2023-02-22 ENCOUNTER — NON-APPOINTMENT (OUTPATIENT)
Age: 53
End: 2023-02-22

## 2023-02-22 ENCOUNTER — APPOINTMENT (OUTPATIENT)
Dept: RADIATION ONCOLOGY | Facility: CLINIC | Age: 53
End: 2023-02-22
Payer: COMMERCIAL

## 2023-02-22 PROCEDURE — 99214 OFFICE O/P EST MOD 30 MIN: CPT | Mod: 25

## 2023-02-22 PROCEDURE — 77290 THER RAD SIMULAJ FIELD CPLX: CPT

## 2023-02-22 PROCEDURE — 77333 RADIATION TREATMENT AID(S): CPT

## 2023-02-22 NOTE — REASON FOR VISIT
[Consideration for Non-Curative Therapy] : consideration for non-curative therapy for [Bone Metastasis] : bone metastasis

## 2023-02-22 NOTE — HISTORY OF PRESENT ILLNESS
[FreeTextEntry1] : This is a 52 year old female diagnosed with metastatic breast cancer referred by Dr. Hughes. \par \par She was first diagnosed with node-negative left breast cancer in 2018 and underwent bilateral mastectomies with Dr. Parson. No chemo, hormonal therapy, or radiation given. She has a history of cocaine abuse (quit in 1999), alcohol abuse (quit and is in AA), and cirrhosis with esophageal varices. \par \par She presented to  in November 2022 with SOB, coughing, abdominal distention, and a 16 pound weight loss. \par \par CT A/P and CT angio chest performed on 11/28/22 showed no PE. Small left pleural effusion. Cirrhosis, splenomegaly, portal hypertension and moderate ascites. Esophageal varices. Diffuse lytic change of the sternum suspicious for metastatic disease. \par \par Diagnostic paracentesis performed on 11/29/22 was negative for malignant cells. \par \par Colonoscopy/EGD performed on 12/1/22 was negative. \par \par Sternal mass biopsy performed on 12/27/22 showed metastatic adenocarcinoma consistent with patient's known breast primary. ER positive, NE negative, HER2 negative by FISH. \par \par PET scan performed on 1/17/23 showed no evidence of locally recurrent FDG avid breast malignancy. Destructive lesion in the sternum with increased activity compatible with biopsied osseous metastasis. L5 with lucent changes and focal activity where additional site of disease involvement is suspected. Interval resolution of left pleural effusion and marked improvement in abdominopelvic ascites from 11/2022 CT. Mild splenomegaly with physiologic degree of activity.\par \par States she has lost a total of 25 pounds over the past one year. \par \par In summary, she has a history of left breast cancer s/p mastectomy with implant reconstruction in 2018. Prophylactic right mastectomy also done at that time. She received no adjuvant postoperative RT, chemotx, or hormonal therapy. Now found to have metastatic disease in sternum, biopsy-proven to be breast ca ER+ NE neg Her2 neg, and in L5 vertebra. \par \par Patient has started Letrozole and denosumab.\par \par She states she has pains throughout her body, as well as in the lower back and sternum. She is to speak with Dr. Hughes regarding her Letrozole and body pains. States she is not able to get into a comfortable position and is not sleeping. Not taking pain medication as she has a previous history of substance abuse and does not wish to take anything. She presents for radiation therapy treatment planning.

## 2023-02-22 NOTE — VITALS
[Maximal Pain Intensity: 8/10] : 8/10 [80: Normal activity with effort; some signs or symptoms of disease.] : 80: Normal activity with effort; some signs or symptoms of disease.

## 2023-02-22 NOTE — PHYSICAL EXAM
[Normal] : oriented to person, place and time, the affect was normal, the mood was normal and not anxious [de-identified] : Tenderness over lumbar spine and sternum

## 2023-02-25 ENCOUNTER — OUTPATIENT (OUTPATIENT)
Dept: OUTPATIENT SERVICES | Facility: HOSPITAL | Age: 53
LOS: 1 days | Discharge: HOME | End: 2023-02-25

## 2023-02-25 DIAGNOSIS — Z98.891 HISTORY OF UTERINE SCAR FROM PREVIOUS SURGERY: Chronic | ICD-10-CM

## 2023-02-25 DIAGNOSIS — Z90.13 ACQUIRED ABSENCE OF BILATERAL BREASTS AND NIPPLES: Chronic | ICD-10-CM

## 2023-02-25 DIAGNOSIS — D64.9 ANEMIA, UNSPECIFIED: ICD-10-CM

## 2023-03-02 PROCEDURE — 77373 STRTCTC BDY RAD THER TX DLVR: CPT

## 2023-03-03 ENCOUNTER — APPOINTMENT (OUTPATIENT)
Dept: HEMATOLOGY ONCOLOGY | Facility: CLINIC | Age: 53
End: 2023-03-03
Payer: COMMERCIAL

## 2023-03-03 VITALS
HEART RATE: 96 BPM | HEIGHT: 68 IN | BODY MASS INDEX: 21.22 KG/M2 | SYSTOLIC BLOOD PRESSURE: 108 MMHG | OXYGEN SATURATION: 98 % | TEMPERATURE: 98.7 F | WEIGHT: 140 LBS | RESPIRATION RATE: 18 BRPM | DIASTOLIC BLOOD PRESSURE: 70 MMHG

## 2023-03-03 PROCEDURE — 99215 OFFICE O/P EST HI 40 MIN: CPT

## 2023-03-03 NOTE — REVIEW OF SYSTEMS
[Patient Intake Form Reviewed] : Patient intake form was reviewed [Negative] : Endocrine [Fatigue] : no fatigue [Recent Change In Weight] : ~T no recent weight change [Joint Pain] : joint pain [Joint Stiffness] : joint stiffness

## 2023-03-03 NOTE — PHYSICAL EXAM
[Restricted in physically strenuous activity but ambulatory and able to carry out work of a light or sedentary nature] : Status 1- Restricted in physically strenuous activity but ambulatory and able to carry out work of a light or sedentary nature, e.g., light house work, office work [Normal] : affect appropriate [de-identified] : looks well  [de-identified] : s/p bilat mastectomies with implants , no axillary adenopathy [de-identified] : not distended   [de-identified] : anterior sternal mass non tender  [de-identified] : no rashes

## 2023-03-03 NOTE — HISTORY OF PRESENT ILLNESS
[de-identified] : MITESH Driscoll a 52 y.o. F with a PMH significant for Breast Ca in 2018 s/p mastectomy (Dr. Parson, no chemo/RT), hx of Cocaine use (quit in 1999), former smoker (quit in 2015), alcohol abuse disorder (no history of seizures/delirium tremens.), cirrhosis with esophageal varices, and COVID-19 10/2022,   who we are following for cytopenias and concern for metastatic breast cancer.   \par \par 11/28/22 - Presented to  [facility] with SOB, cough , abdominal distention, and 16 lbs of weight loss. \par WBC: 9.62,  Hgb: 8.6,  Hct: 24.8,  MCV: 111.2,  Plts: 100.\par PT: 20.1, PTT: 37.5, INR: 1.72\par CT A/P - Advanced cirrhosis with extensive esophageal and paraesophageal varices. Enlarged spleen measuring 15.6cm.  Moderate volume ascites. \par Diffuse lytic change and areas of cortical destruction involving the sternum suspicious for metastatic disease.\par 11/29/22 - Diagnostic paracentesis - consistent with portal HTN and no evidence of SBP.  Cytology was negative for malignant cells. \par EGD -  trace esophageal varices, portal gastropathy, gastric polyps - H. Pylori positive.\par Colonoscopy showed internal hemorrhoids. On  Lasix 40mg PO and spironolactone 100mg PO for management of ascites.  \par \par HAs  Hx of breast cancer in 2018 left breast ( Dr Parson) Opted for bilat mastectomies. \par Invasive ductal 2.2 cm, N0  ( 0/15)    GA 20-30 HEr2 neg Ki 67 > 25  Did not have adjuvant chemo, hormonal tx , no RT ( ? did not follow up).\par \par Biopsy of sternal mass 12/27/22 metastatic adenocarcinoma c/w breast primary  ER positive strong, GA negative  HER2 1-2 +  FISH negative.\par \par PET 1/17/23  destructive avid sternal mass. L5 with focal avidity and lucent changes- suspect met. Resolution of L pleural effusion, marked improvement in ascites . No abnormal FDG uptake in abd pelvis \par \par \par Started Letrozole in Jan 2023 but did not tolerate ( severe disabling arthralgias) .\par \par Currently receiving Rad Onc  for RT to sites of oligometastatic disease ( sternum and L 5) ( Dr Ferrera ). \par  [de-identified] : Joint pains much better since she stopped letrozole. \par No GI c/o No reaccumulation of ascites.

## 2023-03-03 NOTE — ASSESSMENT
[FreeTextEntry1] : 53 y/o female with  alcoholic liver cirrhosis ( no longer drinking - in AA) pancytopenia/ anemia , sternal mass and hx of left breast cancer in 2018 s/p b/l mastectomies T2 N0  ER / ND positive HER2  negative  ( did not have adjuvant therapy)\par \par Jan 2023: biopsy proven recurrence in bone- sternal  mass and L5 met. No other bone lesions on PET, no visceral disease.\par \par Tried letrozole letrozole  Jan 2023 - did not tolerate.\par Will  start exemestane in 1-2 weeks ( after radiation ) \par \par Currently receiving radiation to sites of oligometastatic disease( sternum, L5) \par \par Will need dental clearance for  denosumab.\par \par \par Discussed CDK4/6 inhibitor. Due to liver dysfunction- will need to reduce to daily. Hold off for now- she ahs to be able to tolerate hormonal therapy first before trying any additional meds \par \par Exam in 6 weeks \par \par \par Pancytopenia- at least partially due to liver disease/ cirrhosis. Anemia of chronic disease in setting of malignancy. \par No iron deficiency. Mild degree of hemolysis - common in liver disease.\par Monitor .\par She did not want to have labs done today- will come back next week. \par \par

## 2023-03-06 ENCOUNTER — NON-APPOINTMENT (OUTPATIENT)
Age: 53
End: 2023-03-06

## 2023-03-06 ENCOUNTER — APPOINTMENT (OUTPATIENT)
Dept: HEMATOLOGY ONCOLOGY | Facility: CLINIC | Age: 53
End: 2023-03-06

## 2023-03-06 DIAGNOSIS — D53.9 NUTRITIONAL ANEMIA, UNSPECIFIED: ICD-10-CM

## 2023-03-06 DIAGNOSIS — K70.31 ALCOHOLIC CIRRHOSIS OF LIVER WITH ASCITES: ICD-10-CM

## 2023-03-06 DIAGNOSIS — C79.51 SECONDARY MALIGNANT NEOPLASM OF BONE: ICD-10-CM

## 2023-03-06 DIAGNOSIS — C50.919 MALIGNANT NEOPLASM OF UNSPECIFIED SITE OF UNSPECIFIED FEMALE BREAST: ICD-10-CM

## 2023-03-06 PROCEDURE — 77373 STRTCTC BDY RAD THER TX DLVR: CPT

## 2023-03-06 NOTE — DISEASE MANAGEMENT
[Clinical] : TNM Stage: c [N/A] : Currently not applicable [FreeTextEntry4] : left breast cancer, recurrent [TTNM] : - [NTNM] : - [MTNM] : - [de-identified] : 900 [de-identified] : 7032 [de-identified] : Sternum/ L5

## 2023-03-06 NOTE — HISTORY OF PRESENT ILLNESS
[FreeTextEntry1] : This is a 52 year old female diagnosed with metastatic breast cancer referred by Dr. Hughes. \par \par She was first diagnosed with node-negative left breast cancer in 2018 and underwent bilateral mastectomies with Dr. Parson. No chemo, hormonal therapy, or radiation given. She has a history of cocaine abuse (quit in 1999), alcohol abuse (quit and is in AA), and cirrhosis with esophageal varices. \par \par She presented to  in November 2022 with SOB, coughing, abdominal distention, and a 16 pound weight loss. \par \par CT A/P and CT angio chest performed on 11/28/22 showed no PE. Small left pleural effusion. Cirrhosis, splenomegaly, portal hypertension and moderate ascites. Esophageal varices. Diffuse lytic change of the sternum suspicious for metastatic disease. \par \par Diagnostic paracentesis performed on 11/29/22 was negative for malignant cells. \par \par Colonoscopy/EGD performed on 12/1/22 was negative. \par \par Sternal mass biopsy performed on 12/27/22 showed metastatic adenocarcinoma consistent with patient's known breast primary. ER positive, TN negative, HER2 negative by FISH. \par \par PET scan performed on 1/17/23 showed no evidence of locally recurrent FDG avid breast malignancy. Destructive lesion in the sternum with increased activity compatible with biopsied osseous metastasis. L5 with lucent changes and focal activity where additional site of disease involvement is suspected. Interval resolution of left pleural effusion and marked improvement in abdominopelvic ascites from 11/2022 CT. Mild splenomegaly with physiologic degree of activity.\par \par States she has lost a total of 25 pounds over the past one year. \par \par In summary, she has a history of left breast cancer s/p mastectomy with implant reconstruction in 2018. Prophylactic right mastectomy also done at that time. She received no adjuvant postoperative RT, chemotx, or hormonal therapy. Now found to have metastatic disease in sternum, biopsy-proven to be breast ca ER+ TN neg Her2 neg, and in L5 vertebra. \par \par Patient has started Letrozole and denosumab.\par \par She states she has pains throughout her body, as well as in the lower back and sternum. She is to speak with Dr. Hughes regarding her Letrozole and body pains. States she is not able to get into a comfortable position and is not sleeping. Not taking pain medication as she has a previous history of substance abuse and does not wish to take anything. \par \par She is currently undergoing SBRT to the sternum and L5. \par \par She presents for an OTV 2/3 fx. She states she has some pain, but has improved. Using Phys Assist Oncology cream. She is no longer taking Letrozole and will begin exemestane after RT.

## 2023-03-06 NOTE — PHYSICAL EXAM
[FreeTextEntry1] : WDWN.  In no acute distress.\par Skin in RT portals intact.\par Mild erythema and rash over sternum.\par Lumbar spine has no erythema\par

## 2023-03-08 PROCEDURE — 77336 RADIATION PHYSICS CONSULT: CPT

## 2023-03-08 PROCEDURE — 77373 STRTCTC BDY RAD THER TX DLVR: CPT

## 2023-03-08 PROCEDURE — 77435 SBRT MANAGEMENT: CPT

## 2023-03-27 NOTE — CDI QUERY NOTE - NSCDIOTHERTXTBX_GEN_ALL_CORE_HH
Patient noted to have weight loss of 16 pounds of the past year.  Order received for Dietician to see patient and patient was seen and evaluated by the Registered Dietician on 12/1/22 and noted to have "Severe protein calorie malnutrition in context of chronic illness"    Please clarify, after further study, evaluation, review of Registered Dietician Assessment, and treatment if you feel/agree that the patient had:      --Severe Protein Calorie Malnutrition  --Other Please Specify  --Not Clinically Significant        Medical Record Documentation Location:    11/28 History and Physical noting:  CONSTITUTIONAL: + weakness/fatigue, + fevers. No chills +weight loss 16    12/1/22 RD ASSESSMENT:  Pt meets criteria for "severe malnutrition in context of chronic illness"  Etiology r/t inability to consume sufficient energy/protein 2/2 chronic ETOH abuse    Change in Usual Weight Prior to Admission	yes  Was weight change intentional or unintentional?	unintentional  Reason for Weight Change	decreased po intake  other (specify)  ETOH abuse most likely  Usual Weight Prior to Admission (lbs)	155 Swan(s)  Date of Usual Weight (prior to admission)	01-Jun-2022  Weight Change:  Loss  Pounds Lost/Gained:  20 Pound(s)  % Change	12.9 %  Time Frame	6 mos clin sig  Ideal Body Weight (lbs)	140  Ideal Body Weight (kg)	63.5  Physical Assessment	underweight; typical appearance of ETOH abuse, wasting with enlarged abdomen  Nutriton Focused Physical Exam	yes...  Muscle Mass (Loss of Muscle)	Temples...  Clavicles...  Shoulders...  Thigh...  Calf...  Temples Depletion is	severe  Clavicles Depletion is	severe  Shoulders Depletion is	severe  Thigh Depletion is	moderate  Calf Depletion is	moderate  Body Fat (loss of subcutaneous fat)	Orbital...  Triceps...  Buccal...  Orbital Depletion is	moderate  Triceps Depletion is	moderate  Buccal Depletion is	severe  Fluid Accumulation	none documented  Skin	none documented  GI/IO	BM 11/30 loose    Patient meets criteria for malnutrition	yes  Etiology-Basis	Chronic illness  Nutrition Diagnosis	yes...  Nutrition Diagnositc Terminology #1	Malnutrition...  Malnutrition	Pt meets criteria for severe malnutrition in context of chronic illness  Etiology	r/t inability to consume sufficient energy/protein 2/2 chronic ETOH abuse  Signs/Symptoms	AEB moderate/severe muscle/fat wasting , >10% wt loss in 6 mos  Goal/Expected Outcome	Pt will consume/receive >/= 80% of all meals and supplements via tolerated route    PLAN/INTERVENTION:  Obtain Current Weight	yes  Obtain Weekly Weight	yes  Lab (specify)	Obtain vitamin D 25OH level to assess nutriture and supplement prn  Continue Current Nutrition Care Plan	Liberalize diet to Regular to optimize po/nutrient intake.  Change Diet To	regular; supplement (specify); Liberalize diet to Regular to optimize po/nutrient intake.  Oral Nutrition Supplements	Add Ensure Plus Hi Pro BID to help pt optimize ENN po and pro intake.  RD To Remain Available	yes  RD Name and Phone # Left With Pt/Family for Necessary Followup	Richelle Juarez MS, RDN, CDN, FAND [phone]  Additional Recommendations	1. Liberalize diet to Regular to optimize po/nutrient intake.   2. Add Ensure Plus Hi Pro BID to help pt optimize ENN po and pro intake.   3. Consider adding thiamine 100 mg daily 2/2 poor PO intake/ malnutrition and MVI w/ minerals and folate daily to ensure 100% RDA met   4. Monitor bowel movements, if no BM for >3 days, consider implementing bowel regimen.   5. RDN will continue to monitor PO intake, labs, hydration, and wt prn.

## 2023-04-12 ENCOUNTER — APPOINTMENT (OUTPATIENT)
Dept: RADIATION ONCOLOGY | Facility: CLINIC | Age: 53
End: 2023-04-12
Payer: COMMERCIAL

## 2023-04-12 ENCOUNTER — NON-APPOINTMENT (OUTPATIENT)
Age: 53
End: 2023-04-12

## 2023-04-12 VITALS
HEART RATE: 102 BPM | DIASTOLIC BLOOD PRESSURE: 64 MMHG | OXYGEN SATURATION: 97 % | SYSTOLIC BLOOD PRESSURE: 108 MMHG | WEIGHT: 135.6 LBS | RESPIRATION RATE: 18 BRPM | HEIGHT: 68 IN | BODY MASS INDEX: 20.55 KG/M2

## 2023-04-12 PROCEDURE — 99024 POSTOP FOLLOW-UP VISIT: CPT

## 2023-04-12 NOTE — HISTORY OF PRESENT ILLNESS
[FreeTextEntry1] : This is a 52 year old female diagnosed with metastatic breast cancer referred by Dr. Hughes. \par \par She was first diagnosed with node-negative left breast cancer in 2018 and underwent bilateral mastectomies with Dr. Parson. No chemo, hormonal therapy, or radiation given. She has a history of cocaine abuse (quit in 1999), alcohol abuse (quit and is in AA), and cirrhosis with esophageal varices. \par \par She presented to  in November 2022 with SOB, coughing, abdominal distention, and a 16 pound weight loss. \par \par CT A/P and CT angio chest performed on 11/28/22 showed no PE. Small left pleural effusion. Cirrhosis, splenomegaly, portal hypertension and moderate ascites. Esophageal varices. Diffuse lytic change of the sternum suspicious for metastatic disease. \par \par Diagnostic paracentesis performed on 11/29/22 was negative for malignant cells. \par \par Colonoscopy/EGD performed on 12/1/22 was negative. \par \par Sternal mass biopsy performed on 12/27/22 showed metastatic adenocarcinoma consistent with patient's known breast primary. ER positive, NV negative, HER2 negative by FISH. \par \par PET scan performed on 1/17/23 showed no evidence of locally recurrent FDG avid breast malignancy. Destructive lesion in the sternum with increased activity compatible with biopsied osseous metastasis. L5 with lucent changes and focal activity where additional site of disease involvement is suspected. Interval resolution of left pleural effusion and marked improvement in abdominopelvic ascites from 11/2022 CT. Mild splenomegaly with physiologic degree of activity.\par \par States she has lost a total of 25 pounds over the past one year. \par \par In summary, she has a history of left breast cancer s/p mastectomy with implant reconstruction in 2018. Prophylactic right mastectomy also done at that time. She received no adjuvant postoperative RT, chemotx, or hormonal therapy. Now found to have metastatic disease in sternum, biopsy-proven to be breast ca ER+ NV neg Her2 neg, and in L5 vertebra. \par \par Patient has started Letrozole and denosumab.\par \par She states she has pains throughout her body, as well as in the lower back and sternum. She is to speak with Dr. Hughes regarding her Letrozole and body pains. States she is not able to get into a comfortable position and is not sleeping. Not taking pain medication as she has a previous history of substance abuse and does not wish to take anything. \par \par She completed SBRT for oligometastatic disease in the sternum and L5, total dose of 2700 cGy in 3 fx, on 3/8/23. \par \par She presents for a one month PTE. She states she continues to have back pain. Not taking pain medication. Using Phys Assist Oncology cream. She is no longer taking Letrozole (unable to tolerate), began exemestane.Dr. Mendoza plans denosumab as well, pending dental clearance.  Chang states she is having anxiety about her prognosis and is more emotional.

## 2023-05-21 ENCOUNTER — INPATIENT (INPATIENT)
Facility: HOSPITAL | Age: 53
LOS: 2 days | Discharge: LEFT BEFORE DISCHARGE | DRG: 280 | End: 2023-05-24
Attending: INTERNAL MEDICINE | Admitting: INTERNAL MEDICINE
Payer: COMMERCIAL

## 2023-05-21 VITALS — WEIGHT: 119.93 LBS | HEIGHT: 70 IN

## 2023-05-21 DIAGNOSIS — Z98.891 HISTORY OF UTERINE SCAR FROM PREVIOUS SURGERY: Chronic | ICD-10-CM

## 2023-05-21 DIAGNOSIS — Z90.13 ACQUIRED ABSENCE OF BILATERAL BREASTS AND NIPPLES: Chronic | ICD-10-CM

## 2023-05-21 DIAGNOSIS — K92.2 GASTROINTESTINAL HEMORRHAGE, UNSPECIFIED: ICD-10-CM

## 2023-05-21 LAB
ALBUMIN SERPL ELPH-MCNC: 3.1 G/DL — LOW (ref 3.3–5)
ALP SERPL-CCNC: 84 U/L — SIGNIFICANT CHANGE UP (ref 40–120)
ALT FLD-CCNC: 30 U/L — SIGNIFICANT CHANGE UP (ref 12–78)
ANION GAP SERPL CALC-SCNC: 10 MMOL/L — SIGNIFICANT CHANGE UP (ref 5–17)
ANION GAP SERPL CALC-SCNC: 9 MMOL/L — SIGNIFICANT CHANGE UP (ref 5–17)
APPEARANCE UR: CLEAR — SIGNIFICANT CHANGE UP
APTT BLD: 33.5 SEC — SIGNIFICANT CHANGE UP (ref 27.5–35.5)
AST SERPL-CCNC: 88 U/L — HIGH (ref 15–37)
BASOPHILS # BLD AUTO: 0 K/UL — SIGNIFICANT CHANGE UP (ref 0–0.2)
BASOPHILS NFR BLD AUTO: 0 % — SIGNIFICANT CHANGE UP (ref 0–2)
BILIRUB SERPL-MCNC: 2.2 MG/DL — HIGH (ref 0.2–1.2)
BILIRUB UR-MCNC: NEGATIVE — SIGNIFICANT CHANGE UP
BLD GP AB SCN SERPL QL: SIGNIFICANT CHANGE UP
BUN SERPL-MCNC: 19 MG/DL — SIGNIFICANT CHANGE UP (ref 7–23)
BUN SERPL-MCNC: 19 MG/DL — SIGNIFICANT CHANGE UP (ref 7–23)
CALCIUM SERPL-MCNC: 7.6 MG/DL — LOW (ref 8.5–10.1)
CALCIUM SERPL-MCNC: 8 MG/DL — LOW (ref 8.5–10.1)
CHLORIDE SERPL-SCNC: 103 MMOL/L — SIGNIFICANT CHANGE UP (ref 96–108)
CHLORIDE SERPL-SCNC: 107 MMOL/L — SIGNIFICANT CHANGE UP (ref 96–108)
CO2 SERPL-SCNC: 21 MMOL/L — LOW (ref 22–31)
CO2 SERPL-SCNC: 25 MMOL/L — SIGNIFICANT CHANGE UP (ref 22–31)
COLOR SPEC: YELLOW — SIGNIFICANT CHANGE UP
CREAT SERPL-MCNC: 0.45 MG/DL — LOW (ref 0.5–1.3)
CREAT SERPL-MCNC: 0.5 MG/DL — SIGNIFICANT CHANGE UP (ref 0.5–1.3)
DIFF PNL FLD: NEGATIVE — SIGNIFICANT CHANGE UP
EGFR: 114 ML/MIN/1.73M2 — SIGNIFICANT CHANGE UP
EGFR: 117 ML/MIN/1.73M2 — SIGNIFICANT CHANGE UP
EOSINOPHIL # BLD AUTO: 0.16 K/UL — SIGNIFICANT CHANGE UP (ref 0–0.5)
EOSINOPHIL NFR BLD AUTO: 5 % — SIGNIFICANT CHANGE UP (ref 0–6)
ETHANOL SERPL-MCNC: 13 MG/DL — HIGH (ref 0–10)
GLUCOSE SERPL-MCNC: 104 MG/DL — HIGH (ref 70–99)
GLUCOSE SERPL-MCNC: 116 MG/DL — HIGH (ref 70–99)
GLUCOSE UR QL: NEGATIVE — SIGNIFICANT CHANGE UP
HCT VFR BLD CALC: 22.8 % — LOW (ref 34.5–45)
HCT VFR BLD CALC: 24.1 % — LOW (ref 34.5–45)
HCT VFR BLD CALC: 24.5 % — LOW (ref 34.5–45)
HGB BLD-MCNC: 7.6 G/DL — LOW (ref 11.5–15.5)
HGB BLD-MCNC: 8 G/DL — LOW (ref 11.5–15.5)
HGB BLD-MCNC: 8.1 G/DL — LOW (ref 11.5–15.5)
INR BLD: 1.44 RATIO — HIGH (ref 0.88–1.16)
KETONES UR-MCNC: ABNORMAL
LACTATE SERPL-SCNC: 1.2 MMOL/L — SIGNIFICANT CHANGE UP (ref 0.7–2)
LACTATE SERPL-SCNC: 2.3 MMOL/L — HIGH (ref 0.7–2)
LACTATE SERPL-SCNC: 3.7 MMOL/L — HIGH (ref 0.7–2)
LEUKOCYTE ESTERASE UR-ACNC: NEGATIVE — SIGNIFICANT CHANGE UP
LYMPHOCYTES # BLD AUTO: 0.78 K/UL — LOW (ref 1–3.3)
LYMPHOCYTES # BLD AUTO: 24 % — SIGNIFICANT CHANGE UP (ref 13–44)
MAGNESIUM SERPL-MCNC: 1.3 MG/DL — LOW (ref 1.6–2.6)
MAGNESIUM SERPL-MCNC: 2.3 MG/DL — SIGNIFICANT CHANGE UP (ref 1.6–2.6)
MANUAL SMEAR VERIFICATION: SIGNIFICANT CHANGE UP
MCHC RBC-ENTMCNC: 32.1 PG — SIGNIFICANT CHANGE UP (ref 27–34)
MCHC RBC-ENTMCNC: 32.5 PG — SIGNIFICANT CHANGE UP (ref 27–34)
MCHC RBC-ENTMCNC: 33 PG — SIGNIFICANT CHANGE UP (ref 27–34)
MCHC RBC-ENTMCNC: 33.1 GM/DL — SIGNIFICANT CHANGE UP (ref 32–36)
MCHC RBC-ENTMCNC: 33.2 GM/DL — SIGNIFICANT CHANGE UP (ref 32–36)
MCHC RBC-ENTMCNC: 33.3 GM/DL — SIGNIFICANT CHANGE UP (ref 32–36)
MCV RBC AUTO: 97.2 FL — SIGNIFICANT CHANGE UP (ref 80–100)
MCV RBC AUTO: 98 FL — SIGNIFICANT CHANGE UP (ref 80–100)
MCV RBC AUTO: 99.1 FL — SIGNIFICANT CHANGE UP (ref 80–100)
MONOCYTES # BLD AUTO: 0.23 K/UL — SIGNIFICANT CHANGE UP (ref 0–0.9)
MONOCYTES NFR BLD AUTO: 7 % — SIGNIFICANT CHANGE UP (ref 2–14)
NEUTROPHILS # BLD AUTO: 2.09 K/UL — SIGNIFICANT CHANGE UP (ref 1.8–7.4)
NEUTROPHILS NFR BLD AUTO: 64 % — SIGNIFICANT CHANGE UP (ref 43–77)
NITRITE UR-MCNC: NEGATIVE — SIGNIFICANT CHANGE UP
NRBC # BLD: 0 /100 — SIGNIFICANT CHANGE UP (ref 0–0)
NRBC # BLD: SIGNIFICANT CHANGE UP /100 WBCS (ref 0–0)
PH UR: 5 — SIGNIFICANT CHANGE UP (ref 5–8)
PHOSPHATE SERPL-MCNC: 3.3 MG/DL — SIGNIFICANT CHANGE UP (ref 2.5–4.5)
PLAT MORPH BLD: NORMAL — SIGNIFICANT CHANGE UP
PLATELET # BLD AUTO: 31 K/UL — LOW (ref 150–400)
PLATELET # BLD AUTO: 33 K/UL — LOW (ref 150–400)
PLATELET # BLD AUTO: 41 K/UL — LOW (ref 150–400)
POTASSIUM SERPL-MCNC: 3 MMOL/L — LOW (ref 3.5–5.3)
POTASSIUM SERPL-MCNC: 4.6 MMOL/L — SIGNIFICANT CHANGE UP (ref 3.5–5.3)
POTASSIUM SERPL-SCNC: 3 MMOL/L — LOW (ref 3.5–5.3)
POTASSIUM SERPL-SCNC: 4.6 MMOL/L — SIGNIFICANT CHANGE UP (ref 3.5–5.3)
PROT SERPL-MCNC: 8 GM/DL — SIGNIFICANT CHANGE UP (ref 6–8.3)
PROT UR-MCNC: NEGATIVE — SIGNIFICANT CHANGE UP
PROTHROM AB SERPL-ACNC: 16.8 SEC — HIGH (ref 10.5–13.4)
RBC # BLD: 2.3 M/UL — LOW (ref 3.8–5.2)
RBC # BLD: 2.46 M/UL — LOW (ref 3.8–5.2)
RBC # BLD: 2.52 M/UL — LOW (ref 3.8–5.2)
RBC # FLD: 15.4 % — HIGH (ref 10.3–14.5)
RBC # FLD: 16.1 % — HIGH (ref 10.3–14.5)
RBC # FLD: 17.9 % — HIGH (ref 10.3–14.5)
RBC BLD AUTO: NORMAL — SIGNIFICANT CHANGE UP
SODIUM SERPL-SCNC: 137 MMOL/L — SIGNIFICANT CHANGE UP (ref 135–145)
SODIUM SERPL-SCNC: 138 MMOL/L — SIGNIFICANT CHANGE UP (ref 135–145)
SP GR SPEC: 1.01 — SIGNIFICANT CHANGE UP (ref 1.01–1.02)
TROPONIN I, HIGH SENSITIVITY RESULT: 10.05 NG/L — SIGNIFICANT CHANGE UP
UROBILINOGEN FLD QL: 1
WBC # BLD: 2.93 K/UL — LOW (ref 3.8–10.5)
WBC # BLD: 3.27 K/UL — LOW (ref 3.8–10.5)
WBC # BLD: 4.41 K/UL — SIGNIFICANT CHANGE UP (ref 3.8–10.5)
WBC # FLD AUTO: 2.93 K/UL — LOW (ref 3.8–10.5)
WBC # FLD AUTO: 3.27 K/UL — LOW (ref 3.8–10.5)
WBC # FLD AUTO: 4.41 K/UL — SIGNIFICANT CHANGE UP (ref 3.8–10.5)

## 2023-05-21 PROCEDURE — 84100 ASSAY OF PHOSPHORUS: CPT

## 2023-05-21 PROCEDURE — 93010 ELECTROCARDIOGRAM REPORT: CPT

## 2023-05-21 PROCEDURE — P9016: CPT

## 2023-05-21 PROCEDURE — 93005 ELECTROCARDIOGRAM TRACING: CPT

## 2023-05-21 PROCEDURE — 43244 EGD VARICES LIGATION: CPT

## 2023-05-21 PROCEDURE — 83735 ASSAY OF MAGNESIUM: CPT

## 2023-05-21 PROCEDURE — 99291 CRITICAL CARE FIRST HOUR: CPT

## 2023-05-21 PROCEDURE — 97116 GAIT TRAINING THERAPY: CPT | Mod: GP

## 2023-05-21 PROCEDURE — P9037: CPT

## 2023-05-21 PROCEDURE — C1889: CPT

## 2023-05-21 PROCEDURE — 82140 ASSAY OF AMMONIA: CPT

## 2023-05-21 PROCEDURE — 36430 TRANSFUSION BLD/BLD COMPNT: CPT

## 2023-05-21 PROCEDURE — 86923 COMPATIBILITY TEST ELECTRIC: CPT

## 2023-05-21 PROCEDURE — 81003 URINALYSIS AUTO W/O SCOPE: CPT

## 2023-05-21 PROCEDURE — C9113: CPT

## 2023-05-21 PROCEDURE — 74177 CT ABD & PELVIS W/CONTRAST: CPT | Mod: 26,MA

## 2023-05-21 PROCEDURE — 80048 BASIC METABOLIC PNL TOTAL CA: CPT

## 2023-05-21 PROCEDURE — P9100: CPT

## 2023-05-21 PROCEDURE — 36415 COLL VENOUS BLD VENIPUNCTURE: CPT

## 2023-05-21 PROCEDURE — 83605 ASSAY OF LACTIC ACID: CPT

## 2023-05-21 PROCEDURE — 71260 CT THORAX DX C+: CPT | Mod: 26,MA

## 2023-05-21 PROCEDURE — 97161 PT EVAL LOW COMPLEX 20 MIN: CPT | Mod: GP

## 2023-05-21 PROCEDURE — 85027 COMPLETE CBC AUTOMATED: CPT

## 2023-05-21 PROCEDURE — 80307 DRUG TEST PRSMV CHEM ANLYZR: CPT

## 2023-05-21 PROCEDURE — 87086 URINE CULTURE/COLONY COUNT: CPT

## 2023-05-21 PROCEDURE — P9040: CPT

## 2023-05-21 RX ORDER — CEFTRIAXONE 500 MG/1
1000 INJECTION, POWDER, FOR SOLUTION INTRAMUSCULAR; INTRAVENOUS EVERY 24 HOURS
Refills: 0 | Status: DISCONTINUED | OUTPATIENT
Start: 2023-05-22 | End: 2023-05-22

## 2023-05-21 RX ORDER — PANTOPRAZOLE SODIUM 20 MG/1
40 TABLET, DELAYED RELEASE ORAL ONCE
Refills: 0 | Status: COMPLETED | OUTPATIENT
Start: 2023-05-21 | End: 2023-05-21

## 2023-05-21 RX ORDER — OCTREOTIDE ACETATE 200 UG/ML
50 INJECTION, SOLUTION INTRAVENOUS; SUBCUTANEOUS ONCE
Refills: 0 | Status: COMPLETED | OUTPATIENT
Start: 2023-05-21 | End: 2023-05-21

## 2023-05-21 RX ORDER — THIAMINE MONONITRATE (VIT B1) 100 MG
100 TABLET ORAL DAILY
Refills: 0 | Status: DISCONTINUED | OUTPATIENT
Start: 2023-05-21 | End: 2023-05-22

## 2023-05-21 RX ORDER — POTASSIUM CHLORIDE 20 MEQ
10 PACKET (EA) ORAL
Refills: 0 | Status: COMPLETED | OUTPATIENT
Start: 2023-05-21 | End: 2023-05-21

## 2023-05-21 RX ORDER — ONDANSETRON 8 MG/1
4 TABLET, FILM COATED ORAL ONCE
Refills: 0 | Status: COMPLETED | OUTPATIENT
Start: 2023-05-21 | End: 2023-05-21

## 2023-05-21 RX ORDER — FOLIC ACID 0.8 MG
1 TABLET ORAL DAILY
Refills: 0 | Status: DISCONTINUED | OUTPATIENT
Start: 2023-05-21 | End: 2023-05-22

## 2023-05-21 RX ORDER — PANTOPRAZOLE SODIUM 20 MG/1
8 TABLET, DELAYED RELEASE ORAL
Qty: 80 | Refills: 0 | Status: DISCONTINUED | OUTPATIENT
Start: 2023-05-21 | End: 2023-05-23

## 2023-05-21 RX ORDER — BNT162B2 ORIGINAL AND OMICRON BA.4/BA.5 .1125; .1125 MG/2.25ML; MG/2.25ML
0.3 INJECTION, SUSPENSION INTRAMUSCULAR ONCE
Refills: 0 | Status: DISCONTINUED | OUTPATIENT
Start: 2023-05-21 | End: 2023-05-21

## 2023-05-21 RX ORDER — METOCLOPRAMIDE HCL 10 MG
10 TABLET ORAL ONCE
Refills: 0 | Status: COMPLETED | OUTPATIENT
Start: 2023-05-21 | End: 2023-05-21

## 2023-05-21 RX ORDER — MAGNESIUM SULFATE 500 MG/ML
2 VIAL (ML) INJECTION ONCE
Refills: 0 | Status: COMPLETED | OUTPATIENT
Start: 2023-05-21 | End: 2023-05-21

## 2023-05-21 RX ORDER — CEFTRIAXONE 500 MG/1
1000 INJECTION, POWDER, FOR SOLUTION INTRAMUSCULAR; INTRAVENOUS ONCE
Refills: 0 | Status: COMPLETED | OUTPATIENT
Start: 2023-05-21 | End: 2023-05-21

## 2023-05-21 RX ORDER — SODIUM CHLORIDE 9 MG/ML
1000 INJECTION INTRAMUSCULAR; INTRAVENOUS; SUBCUTANEOUS ONCE
Refills: 0 | Status: COMPLETED | OUTPATIENT
Start: 2023-05-21 | End: 2023-05-21

## 2023-05-21 RX ORDER — OCTREOTIDE ACETATE 200 UG/ML
50 INJECTION, SOLUTION INTRAVENOUS; SUBCUTANEOUS
Qty: 500 | Refills: 0 | Status: DISCONTINUED | OUTPATIENT
Start: 2023-05-21 | End: 2023-05-23

## 2023-05-21 RX ADMIN — Medication 2 MILLIGRAM(S): at 18:30

## 2023-05-21 RX ADMIN — PANTOPRAZOLE SODIUM 10 MG/HR: 20 TABLET, DELAYED RELEASE ORAL at 23:46

## 2023-05-21 RX ADMIN — SODIUM CHLORIDE 1000 MILLILITER(S): 9 INJECTION INTRAMUSCULAR; INTRAVENOUS; SUBCUTANEOUS at 06:10

## 2023-05-21 RX ADMIN — Medication 100 MILLIGRAM(S): at 09:52

## 2023-05-21 RX ADMIN — PANTOPRAZOLE SODIUM 10 MG/HR: 20 TABLET, DELAYED RELEASE ORAL at 17:01

## 2023-05-21 RX ADMIN — PANTOPRAZOLE SODIUM 10 MG/HR: 20 TABLET, DELAYED RELEASE ORAL at 06:55

## 2023-05-21 RX ADMIN — Medication 100 MILLIEQUIVALENT(S): at 09:49

## 2023-05-21 RX ADMIN — Medication 2 MILLIGRAM(S): at 09:51

## 2023-05-21 RX ADMIN — Medication 10 MILLIGRAM(S): at 07:34

## 2023-05-21 RX ADMIN — Medication 100 MILLIEQUIVALENT(S): at 08:15

## 2023-05-21 RX ADMIN — ONDANSETRON 4 MILLIGRAM(S): 8 TABLET, FILM COATED ORAL at 06:09

## 2023-05-21 RX ADMIN — Medication 1 MILLIGRAM(S): at 10:33

## 2023-05-21 RX ADMIN — Medication 25 GRAM(S): at 13:29

## 2023-05-21 RX ADMIN — OCTREOTIDE ACETATE 10 MICROGRAM(S)/HR: 200 INJECTION, SOLUTION INTRAVENOUS; SUBCUTANEOUS at 20:31

## 2023-05-21 RX ADMIN — Medication 100 MILLIEQUIVALENT(S): at 10:57

## 2023-05-21 RX ADMIN — Medication 2 MILLIGRAM(S): at 14:30

## 2023-05-21 RX ADMIN — PANTOPRAZOLE SODIUM 40 MILLIGRAM(S): 20 TABLET, DELAYED RELEASE ORAL at 06:10

## 2023-05-21 RX ADMIN — Medication 2 MILLIGRAM(S): at 22:03

## 2023-05-21 RX ADMIN — CEFTRIAXONE 1000 MILLIGRAM(S): 500 INJECTION, POWDER, FOR SOLUTION INTRAMUSCULAR; INTRAVENOUS at 06:55

## 2023-05-21 RX ADMIN — OCTREOTIDE ACETATE 50 MICROGRAM(S): 200 INJECTION, SOLUTION INTRAVENOUS; SUBCUTANEOUS at 06:29

## 2023-05-21 NOTE — ED ADULT TRIAGE NOTE - CHIEF COMPLAINT QUOTE
patient reports she woke up and vomited large amount of dark red blood with clots. history of metastatic cancer. pale and feeling weak after vomiting. denies anticoagulant use patient reports she woke up and vomited large amount of dark red blood with clots. history of metastatic cancer, last radiation treatment 2 months ago. pale and feeling weak after vomiting. denies anticoagulant use

## 2023-05-21 NOTE — PROGRESS NOTE ADULT - SUBJECTIVE AND OBJECTIVE BOX
ICU Progress Note    HPI:    S:    Pt seen and examined  HD # 1  FULL CODE  PMHx chronic alcohol abuse, cirrhosis, esophageal varices, metastatic breast cancer status post radiation treatment most recently 2 weeks ago   presents for evaluation of gross hematemesis that she noted just prior to arrival shortly after waking up.  Patient states that she was feeling dizzy and fatigued and then had 1 large episode of vomiting of bright red blood with clots.  Patient denies any abdominal pain.  Patient denies noting any melena or bright red blood per rectum recently.  Patient is not on any anticoagulation.  During my evaluation of the patient, she had 1 large episode of hematemesis that was greater than 500 cc.  Her gastroenterologist is Dr. Joseph.    Admitted to ICU, resuscitated with blood products, volume, optimized for EGD    : s/p EGD with cauterization of gastric vessel/ulcer which shoed stigmata of recent bleeding and banding of varices. Ativan ATC taper for CORNELIO/DT's.     ROS: + hematemesis    Allergies    No Known Allergies    Intolerances    MEDICATIONS  (STANDING):    folic acid Injectable 1 milliGRAM(s) IV Push daily  LORazepam   Injectable 2 milliGRAM(s) IV Push every 4 hours  LORazepam   Injectable   IV Push   octreotide  Infusion 50 MICROgram(s)/Hr (10 mL/Hr) IV Continuous <Continuous>  pantoprazole Infusion 8 mG/Hr (10 mL/Hr) IV Continuous <Continuous>  thiamine Injectable 100 milliGRAM(s) IV Push daily    MEDICATIONS  (PRN):    Drug Dosing Weight    Height (cm): 177.8 (21 May 2023 05:24)  Weight (kg): 54.4 (21 May 2023 05:24)  BMI (kg/m2): 17.2 (21 May 2023 05:24)  BSA (m2): 1.68 (21 May 2023 05:24)    PAST MEDICAL & SURGICAL HISTORY:    Chronic alcohol abuse  History of cocaine use  quit in   Former smoker  quit in   Breast cancer  s/p bilateral mastectomy in 2018  2019 novel coronavirus disease (COVID-19)  Cirrhosis  History of bilateral mastectomy  History of     FAMILY HISTORY:    Family history of heart disease (Father)    Family history of breast cancer in mother (Mother)    Family history of cervical cancer (Mother, Sibling)    FH: diabetes mellitus    Family hx of hypertension    ROS: See HPI; otherwise, all systems reviewed and negative.    O:    ICU Vital Signs Last 24 Hrs  T(C): 37.2 (21 May 2023 13:00), Max: 37.3 (21 May 2023 11:00)  T(F): 98.9 (21 May 2023 13:00), Max: 99.1 (21 May 2023 11:00)  HR: 100 (21 May 2023 16:00) (93 - 121)  BP: 110/61 (21 May 2023 16:00) (86/74 - 120/61)  BP(mean): 72 (21 May 2023 16:00) (60 - 78)  ABP: --  ABP(mean): --  RR: 17 (21 May 2023 16:00) (13 - 21)  SpO2: 94% (21 May 2023 16:00) (93% - 100%)    O2 Parameters below as of 21 May 2023 16:00  Patient On (Oxygen Delivery Method): room air    I&O's Detail    20 May 2023 07:01  -  21 May 2023 07:00  --------------------------------------------------------  IN:  Total IN: 0 mL    OUT:    Emesis (mL): 650 mL  Total OUT: 650 mL    Total NET: -650 mL      21 May 2023 07:01  -  21 May 2023 17:43  --------------------------------------------------------  IN:    IV PiggyBack: 350 mL    Platelets - Single Donor: 222 mL    PRBCs (Packed Red Blood Cells): 309 mL  Total IN: 881 mL    OUT:    Voided (mL): 800 mL  Total OUT: 800 mL    Total NET: 81 mL    PE:    Adult F lying in bed  No JVD trachea midline  S1S2+ borderline inus tachycardia  CTA B/L  Abd soft NTND  No leg swelling/edema noted  Awake and alert  Skin pink and warm    LABS:    CBC Full  -  ( 21 May 2023 10:32 )  WBC Count : 4.41 K/uL  RBC Count : 2.46 M/uL  Hemoglobin : 8.0 g/dL  Hematocrit : 24.1 %  Platelet Count - Automated : 31 K/uL  Mean Cell Volume : 98.0 fl  Mean Cell Hemoglobin : 32.5 pg  Mean Cell Hemoglobin Concentration : 33.2 gm/dL  Auto Neutrophil # : x  Auto Lymphocyte # : x  Auto Monocyte # : x  Auto Eosinophil # : x  Auto Basophil # : x  Auto Neutrophil % : x  Auto Lymphocyte % : x  Auto Monocyte % : x  Auto Eosinophil % : x  Auto Basophil % : x        138  |  107  |  19  ----------------------------<  104<H>  4.6   |  21<L>  |  0.45<L>    Ca    7.6<L>      21 May 2023 10:32  Phos  3.3       Mg     1.3         TPro  8.0  /  Alb  3.1<L>  /  TBili  2.2<H>  /  DBili  x   /  AST  88<H>  /  ALT  30  /  AlkPhos  84      PT/INR - ( 21 May 2023 05:48 )   PT: 16.8 sec;   INR: 1.44 ratio      PTT - ( 21 May 2023 05:48 )  PTT:33.5 sec  Urinalysis Basic - ( 21 May 2023 17:00 )    Color: Yellow / Appearance: Clear / S.010 / pH: x  Gluc: x / Ketone: Moderate  / Bili: Negative / Urobili: 1   Blood: x / Protein: Negative / Nitrite: Negative   Leuk Esterase: Negative / RBC: x / WBC x   Sq Epi: x / Non Sq Epi: x / Bacteria: x    CAPILLARY BLOOD GLUCOSE    LIVER FUNCTIONS - ( 21 May 2023 05:48 )  Alb: 3.1 g/dL / Pro: 8.0 gm/dL / ALK PHOS: 84 U/L / ALT: 30 U/L / AST: 88 U/L / GGT: x

## 2023-05-21 NOTE — CONSULT NOTE ADULT - SUBJECTIVE AND OBJECTIVE BOX
GI consult  pt of Dr. Joseph    HPI:  52 year old female with a PMH of Breast CA s/p mastectomy in 2018 dx'd with bone mets Dec 2022  Recent XRT    Alcohol abuse disorder with portal HTN and cirrhosis EGD done in Dec with:    Trace esophogeal  varices  Portal gastropathy  Gastric polyp  O/W normal    She had been sober, but started drinking heavily for the last month about 1/2 liter of vodka/day.     Presents with significant hematemesis ABLA  thrombocytopenia hypokalemia     Per the patient  "a liter of dark blood with clots"  at 5 am this morning.     In the ED  had an additional 500cc of dark blood with clots.      Pt seen in ICU, comfortable, not actively vomiting but nauseated s/p 2 u prbc.      PAST MEDICAL & SURGICAL HISTORY:  Chronic alcohol abuse      History of cocaine use  quit in       Former smoker  quit in       Breast cancer  s/p bilateral mastectomy in 2018 novel coronavirus disease (COVID-19)      Cirrhosis      History of bilateral mastectomy      History of           Home Medications:  Multiple Vitamins oral tablet: 1 tab(s) orally once a day (27 Dec 2022 07:28)      MEDICATIONS  (STANDING):  folic acid Injectable 1 milliGRAM(s) IV Push daily  LORazepam   Injectable 2 milliGRAM(s) IV Push every 4 hours  LORazepam   Injectable   IV Push   octreotide  Infusion 50 MICROgram(s)/Hr (10 mL/Hr) IV Continuous <Continuous>  pantoprazole Infusion 8 mG/Hr (10 mL/Hr) IV Continuous <Continuous>  potassium chloride  10 mEq/100 mL IVPB 10 milliEquivalent(s) IV Intermittent every 1 hour  thiamine Injectable 100 milliGRAM(s) IV Push daily    MEDICATIONS  (PRN):      Allergies    No Known Allergies    Intolerances        SOCIAL HISTORY: +etoh    FAMILY HISTORY:  Family history of heart disease (Father)    Family history of breast cancer in mother (Mother)    Family history of cervical cancer (Mother, Sibling)    FH: diabetes mellitus    Family hx of hypertension        ROS  As above  Otherwise unremarkable, all systems reviewed    PE:  Vital Signs Last 24 Hrs  T(C): 36.8 (21 May 2023 09:07), Max: 36.9 (21 May 2023 07:26)  T(F): 98.3 (21 May 2023 09:07), Max: 98.5 (21 May 2023 07:26)  HR: 110 (21 May 2023 09:07) (107 - 121)  BP: 113/50 (21 May 2023 09:07) (91/38 - 113/50)  BP(mean): 60 (21 May 2023 05:25) (60 - 60)  RR: 20 (21 May 2023 09:07) (19 - 21)  SpO2: 100% (21 May 2023 09:07) (98% - 100%)    Parameters below as of 21 May 2023 09:07  Patient On (Oxygen Delivery Method): room air        Constitutional: NAD, well-developed, A+Ox3  Anicteric   Respiratory: CTABL, breathing comfortably  Cardiovascular: S1 and S2, RRR  Gastrointestinal: +BS, soft, non tender, non distended, no mass  Extremities: warm, well perfused, no edema  Psychiatric: Normal mood, normal affect  Neuro: moves all extremities, grossly intact  Skin: No rashes or lesions    LABS:                        7.6    3.27  )-----------( 41       ( 21 May 2023 05:48 )             22.8     05-    137  |  103  |  19  ----------------------------<  116<H>  3.0<L>   |  25  |  0.50    Ca    8.0<L>      21 May 2023 05:48    TPro  8.0  /  Alb  3.1<L>  /  TBili  2.2<H>  /  DBili  x   /  AST  88<H>  /  ALT  30  /  AlkPhos  84  05-21    PT/INR - ( 21 May 2023 05:48 )   PT: 16.8 sec;   INR: 1.44 ratio         PTT - ( 21 May 2023 05:48 )  PTT:33.5 sec  LIVER FUNCTIONS - ( 21 May 2023 05:48 )  Alb: 3.1 g/dL / Pro: 8.0 gm/dL / ALK PHOS: 84 U/L / ALT: 30 U/L / AST: 88 U/L / GGT: x             RADIOLOGY & ADDITIONAL STUDIES:  CT cirrhosis/varices

## 2023-05-21 NOTE — CONSULT NOTE ADULT - ASSESSMENT
UGIB  likely variceal  active EtOH abuse    Rec:  -EGD today, likely will need banding  -cont PPI and octreotide gtts  -start abx  -trend CBC  -d/w Dr. Marcano  -d/w Dr. Fitzgerald  -Dr. Joseph to resume care in AM

## 2023-05-21 NOTE — PROGRESS NOTE ADULT - SUBJECTIVE AND OBJECTIVE BOX
Patient is a 52y old  Female who presents with a chief complaint of Hematemesis (21 May 2023 10:12)    24 hour events:     Allergies    No Known Allergies    Intolerances      REVIEW OF SYSTEMS: SEE BELOW       ICU Vital Signs Last 24 Hrs  T(C): 37.2 (21 May 2023 11:15), Max: 37.3 (21 May 2023 11:00)  T(F): 98.9 (21 May 2023 11:15), Max: 99.1 (21 May 2023 11:00)  HR: 104 (21 May 2023 11:15) (98 - 121)  BP: 112/70 (21 May 2023 11:15) (86/74 - 120/61)  BP(mean): 78 (21 May 2023 11:15) (60 - 78)  ABP: --  ABP(mean): --  RR: 19 (21 May 2023 11:15) (14 - 21)  SpO2: 97% (21 May 2023 11:15) (93% - 100%)    O2 Parameters below as of 21 May 2023 11:00  Patient On (Oxygen Delivery Method): room air            CAPILLARY BLOOD GLUCOSE          I&O's Summary    20 May 2023 07:01  -  21 May 2023 07:00  --------------------------------------------------------  IN: 0 mL / OUT: 650 mL / NET: -650 mL    21 May 2023 07:01  -  21 May 2023 11:42  --------------------------------------------------------  IN: 531 mL / OUT: 0 mL / NET: 531 mL            MEDICATIONS  (STANDING):  folic acid Injectable 1 milliGRAM(s) IV Push daily  LORazepam   Injectable 2 milliGRAM(s) IV Push every 4 hours  LORazepam   Injectable   IV Push   magnesium sulfate  IVPB 2 Gram(s) IV Intermittent once  octreotide  Infusion 50 MICROgram(s)/Hr (10 mL/Hr) IV Continuous <Continuous>  pantoprazole Infusion 8 mG/Hr (10 mL/Hr) IV Continuous <Continuous>  thiamine Injectable 100 milliGRAM(s) IV Push daily      MEDICATIONS  (PRN):      PHYSICAL EXAM: SEE BELOW                          8.0    4.41  )-----------( 31       ( 21 May 2023 10:32 )             24.1       05-21    138  |  107  |  19  ----------------------------<  104<H>  4.6   |  21<L>  |  0.45<L>    Ca    7.6<L>      21 May 2023 10:32  Phos  3.3     05-21  Mg     1.3     05-21    TPro  8.0  /  Alb  3.1<L>  /  TBili  2.2<H>  /  DBili  x   /  AST  88<H>  /  ALT  30  /  AlkPhos  84  05-21    Lactate 2.3           05-21 @ 10:32    Lactate 3.7           05-21 @ 05:48          PT/INR - ( 21 May 2023 05:48 )   PT: 16.8 sec;   INR: 1.44 ratio         PTT - ( 21 May 2023 05:48 )  PTT:33.5 sec

## 2023-05-21 NOTE — ED PROVIDER NOTE - CLINICAL SUMMARY MEDICAL DECISION MAKING FREE TEXT BOX
52-year-old female with history of metastatic breast cancer status post radiation, alcohol abuse with cirrhosis and esophageal varices presents with massive upper GI bleed with hematemesis.  Patient actively vomiting bright red blood with clots in the emergency department.  We will get stat CBC, type and screen, CMP, CT of the chest abdomen pelvis with IV contrast.  Will transfuse PRBCs administer Protonix, octreotide, stat GI consult and stat ICU consult

## 2023-05-21 NOTE — PATIENT PROFILE ADULT - FALL HARM RISK - UNIVERSAL INTERVENTIONS
Bed in lowest position, wheels locked, appropriate side rails in place/Call bell, personal items and telephone in reach/Instruct patient to call for assistance before getting out of bed or chair/Non-slip footwear when patient is out of bed/[place] to call system/Physically safe environment - no spills, clutter or unnecessary equipment/Purposeful Proactive Rounding/Room/bathroom lighting operational, light cord in reach

## 2023-05-21 NOTE — ED ADULT NURSE NOTE - NSFALLHARMRISKINTERV_ED_ALL_ED

## 2023-05-21 NOTE — H&P ADULT - HISTORY OF PRESENT ILLNESS
52 year old female with a PMH of Breast CA s/p mastectomy in 2018 dx'd with bone mets Dec 2022  Recent XRT    Alcohol abuse disorder with portal HTN and cirrhosis EGD done in Dec with:    Trace esophogeal  varices  Portal gastropathy  Gastric polyp  O/W normal    She had been sober, but started drinking heavily for the last month about 1/2 liter of vodka/day.     Presents with significant hematemesis ABLA  thrombocytopenia hypokalemia     Per the patient  "a liter of dark blood with clots"  at 5 am this morning.     In the ED  had an additional 500cc of dark blood with clots.

## 2023-05-21 NOTE — ED ADULT NURSE NOTE - OBJECTIVE STATEMENT
Pt presented to ER c/o gross hematemesis beginning this AM. Pt reports 1 episode of dark red clotty emesis. Pt denies blood thinner use. Pt also had 2 more episodes in ER of dark red clotty emesis totaling 650mL. MD River aware.

## 2023-05-21 NOTE — ED ADULT NURSE NOTE - CHIEF COMPLAINT QUOTE
patient reports she woke up and vomited large amount of dark red blood with clots. history of metastatic cancer, last radiation treatment 2 months ago. pale and feeling weak after vomiting. denies anticoagulant use

## 2023-05-21 NOTE — H&P ADULT - ASSESSMENT
52F AUD cirrhosis portal HTN metastatic breast CA     Here with UGIB  known varices and gastric polyp    She is tachycardiac and hgb has not equilibrated.      She already has tremors start standing ativan taper CIWA scoring   IV thiamine.        CT with patulous varices, and likely the source of bleeding.  No ascites.        BP on the low side  tachy, so hemorrhagic shock.    Large bore IV;s       Packed cells  careful not to over transfuse.    Plts   single donor  Check fibrinogen and transfuse cryo if low.  Any further bleeding give FFP too.   PPI drip till EGD  Octreatide IVP  with drip   CTX for variceal translocation   Serial CBC  Endoscopy per Dr Denis     CCT 75

## 2023-05-21 NOTE — ED PROVIDER NOTE - OBJECTIVE STATEMENT
52-year-old female with history of chronic alcohol abuse, cirrhosis, esophageal varices, metastatic breast cancer status post radiation treatment most recently 2 weeks ago presents for evaluation of gross hematemesis that she noted just prior to arrival shortly after waking up.  Patient states that she was feeling dizzy and fatigued and then had 1 large episode of vomiting of bright red blood with clots.  Patient denies any abdominal pain.  Patient denies noting any melena or bright red blood per rectum recently.  Patient is not on any anticoagulation.  During my evaluation of the patient, she had 1 large episode of hematemesis that was greater than 500 cc.  Her gastroenterologist is Dr. Joseph.

## 2023-05-21 NOTE — ED PROVIDER NOTE - NSICDXPASTMEDICALHX_GEN_ALL_CORE_FT
PAST MEDICAL HISTORY:  2019 novel coronavirus disease (COVID-19)     Breast cancer s/p bilateral mastectomy in 2018    Chronic alcohol abuse     Cirrhosis     Former smoker quit in 2015    History of cocaine use quit in 1999

## 2023-05-22 DIAGNOSIS — F41.8 OTHER SPECIFIED ANXIETY DISORDERS: ICD-10-CM

## 2023-05-22 LAB
AMMONIA BLD-MCNC: 37 UMOL/L — HIGH (ref 11–32)
ANION GAP SERPL CALC-SCNC: 5 MMOL/L — SIGNIFICANT CHANGE UP (ref 5–17)
BILIRUB SERPL-MCNC: 3.3 MG/DL — HIGH (ref 0.2–1.2)
BUN SERPL-MCNC: 19 MG/DL — SIGNIFICANT CHANGE UP (ref 7–23)
CALCIUM SERPL-MCNC: 8.4 MG/DL — LOW (ref 8.5–10.1)
CHLORIDE SERPL-SCNC: 104 MMOL/L — SIGNIFICANT CHANGE UP (ref 96–108)
CO2 SERPL-SCNC: 26 MMOL/L — SIGNIFICANT CHANGE UP (ref 22–31)
CREAT SERPL-MCNC: 0.63 MG/DL — SIGNIFICANT CHANGE UP (ref 0.5–1.3)
CULTURE RESULTS: SIGNIFICANT CHANGE UP
EGFR: 108 ML/MIN/1.73M2 — SIGNIFICANT CHANGE UP
GLUCOSE SERPL-MCNC: 148 MG/DL — HIGH (ref 70–99)
HCT VFR BLD CALC: 22.5 % — LOW (ref 34.5–45)
HCT VFR BLD CALC: 24.4 % — LOW (ref 34.5–45)
HGB BLD-MCNC: 7.3 G/DL — LOW (ref 11.5–15.5)
HGB BLD-MCNC: 8.1 G/DL — LOW (ref 11.5–15.5)
INR BLD: 1.53 RATIO — HIGH (ref 0.88–1.16)
MAGNESIUM SERPL-MCNC: 2.2 MG/DL — SIGNIFICANT CHANGE UP (ref 1.6–2.6)
MCHC RBC-ENTMCNC: 31.7 PG — SIGNIFICANT CHANGE UP (ref 27–34)
MCHC RBC-ENTMCNC: 32.3 PG — SIGNIFICANT CHANGE UP (ref 27–34)
MCHC RBC-ENTMCNC: 32.4 GM/DL — SIGNIFICANT CHANGE UP (ref 32–36)
MCHC RBC-ENTMCNC: 33.2 GM/DL — SIGNIFICANT CHANGE UP (ref 32–36)
MCV RBC AUTO: 97.2 FL — SIGNIFICANT CHANGE UP (ref 80–100)
MCV RBC AUTO: 97.8 FL — SIGNIFICANT CHANGE UP (ref 80–100)
MELD SCORE WITH DIALYSIS: 30 POINTS — SIGNIFICANT CHANGE UP
MELD SCORE WITHOUT DIALYSIS: 18 POINTS — SIGNIFICANT CHANGE UP
PHOSPHATE SERPL-MCNC: 2.7 MG/DL — SIGNIFICANT CHANGE UP (ref 2.5–4.5)
PLATELET # BLD AUTO: 38 K/UL — LOW (ref 150–400)
PLATELET # BLD AUTO: 43 K/UL — LOW (ref 150–400)
POTASSIUM SERPL-MCNC: 4.5 MMOL/L — SIGNIFICANT CHANGE UP (ref 3.5–5.3)
POTASSIUM SERPL-SCNC: 4.5 MMOL/L — SIGNIFICANT CHANGE UP (ref 3.5–5.3)
PROTHROM AB SERPL-ACNC: 17.8 SEC — HIGH (ref 10.5–13.4)
RBC # BLD: 2.3 M/UL — LOW (ref 3.8–5.2)
RBC # BLD: 2.51 M/UL — LOW (ref 3.8–5.2)
RBC # FLD: 17.7 % — HIGH (ref 10.3–14.5)
RBC # FLD: 17.8 % — HIGH (ref 10.3–14.5)
SODIUM SERPL-SCNC: 135 MMOL/L — SIGNIFICANT CHANGE UP (ref 135–145)
SPECIMEN SOURCE: SIGNIFICANT CHANGE UP
WBC # BLD: 3.53 K/UL — LOW (ref 3.8–10.5)
WBC # BLD: 4.72 K/UL — SIGNIFICANT CHANGE UP (ref 3.8–10.5)
WBC # FLD AUTO: 3.53 K/UL — LOW (ref 3.8–10.5)
WBC # FLD AUTO: 4.72 K/UL — SIGNIFICANT CHANGE UP (ref 3.8–10.5)

## 2023-05-22 PROCEDURE — 90792 PSYCH DIAG EVAL W/MED SRVCS: CPT

## 2023-05-22 PROCEDURE — 99233 SBSQ HOSP IP/OBS HIGH 50: CPT

## 2023-05-22 RX ORDER — THIAMINE MONONITRATE (VIT B1) 100 MG
100 TABLET ORAL DAILY
Refills: 0 | Status: DISCONTINUED | OUTPATIENT
Start: 2023-05-22 | End: 2023-05-24

## 2023-05-22 RX ORDER — ESCITALOPRAM OXALATE 10 MG/1
5 TABLET, FILM COATED ORAL DAILY
Refills: 0 | Status: DISCONTINUED | OUTPATIENT
Start: 2023-05-22 | End: 2023-05-24

## 2023-05-22 RX ORDER — SUCRALFATE 1 G
1 TABLET ORAL
Refills: 0 | Status: DISCONTINUED | OUTPATIENT
Start: 2023-05-22 | End: 2023-05-24

## 2023-05-22 RX ORDER — LACTULOSE 10 G/15ML
10 SOLUTION ORAL ONCE
Refills: 0 | Status: COMPLETED | OUTPATIENT
Start: 2023-05-22 | End: 2023-05-22

## 2023-05-22 RX ORDER — CEFTRIAXONE 500 MG/1
1000 INJECTION, POWDER, FOR SOLUTION INTRAMUSCULAR; INTRAVENOUS EVERY 24 HOURS
Refills: 0 | Status: DISCONTINUED | OUTPATIENT
Start: 2023-05-22 | End: 2023-05-22

## 2023-05-22 RX ORDER — CEFTRIAXONE 500 MG/1
1000 INJECTION, POWDER, FOR SOLUTION INTRAMUSCULAR; INTRAVENOUS EVERY 24 HOURS
Refills: 0 | Status: DISCONTINUED | OUTPATIENT
Start: 2023-05-22 | End: 2023-05-24

## 2023-05-22 RX ORDER — FOLIC ACID 0.8 MG
1 TABLET ORAL DAILY
Refills: 0 | Status: DISCONTINUED | OUTPATIENT
Start: 2023-05-22 | End: 2023-05-24

## 2023-05-22 RX ADMIN — Medication 1 GRAM(S): at 23:51

## 2023-05-22 RX ADMIN — Medication 1.5 MILLIGRAM(S): at 10:30

## 2023-05-22 RX ADMIN — Medication 100 MILLIGRAM(S): at 10:37

## 2023-05-22 RX ADMIN — OCTREOTIDE ACETATE 10 MICROGRAM(S)/HR: 200 INJECTION, SOLUTION INTRAVENOUS; SUBCUTANEOUS at 16:20

## 2023-05-22 RX ADMIN — Medication 1.5 MILLIGRAM(S): at 05:46

## 2023-05-22 RX ADMIN — Medication 1.5 MILLIGRAM(S): at 22:34

## 2023-05-22 RX ADMIN — Medication 2 MILLIGRAM(S): at 02:47

## 2023-05-22 RX ADMIN — LACTULOSE 10 GRAM(S): 10 SOLUTION ORAL at 12:22

## 2023-05-22 RX ADMIN — OCTREOTIDE ACETATE 10 MICROGRAM(S)/HR: 200 INJECTION, SOLUTION INTRAVENOUS; SUBCUTANEOUS at 06:21

## 2023-05-22 RX ADMIN — Medication 1 MILLIGRAM(S): at 10:37

## 2023-05-22 RX ADMIN — Medication 1 GRAM(S): at 18:30

## 2023-05-22 RX ADMIN — Medication 1.5 MILLIGRAM(S): at 14:30

## 2023-05-22 RX ADMIN — PANTOPRAZOLE SODIUM 10 MG/HR: 20 TABLET, DELAYED RELEASE ORAL at 10:36

## 2023-05-22 RX ADMIN — Medication 1.5 MILLIGRAM(S): at 18:30

## 2023-05-22 RX ADMIN — Medication 1 GRAM(S): at 12:21

## 2023-05-22 RX ADMIN — ESCITALOPRAM OXALATE 5 MILLIGRAM(S): 10 TABLET, FILM COATED ORAL at 16:20

## 2023-05-22 RX ADMIN — PANTOPRAZOLE SODIUM 10 MG/HR: 20 TABLET, DELAYED RELEASE ORAL at 20:59

## 2023-05-22 RX ADMIN — CEFTRIAXONE 1000 MILLIGRAM(S): 500 INJECTION, POWDER, FOR SOLUTION INTRAMUSCULAR; INTRAVENOUS at 10:37

## 2023-05-22 NOTE — BH CONSULTATION LIAISON ASSESSMENT NOTE - NSBHCHARTREVIEWLAB_PSY_A_CORE FT
8.1    4.72  )-----------( 43       ( 22 May 2023 11:45 )             24.4   05-22    135  |  104  |  19  ----------------------------<  148<H>  4.5   |  26  |  0.63    Ca    8.4<L>      22 May 2023 05:19  Phos  2.7     05-22  Mg     2.2     05-22    TPro  x   /  Alb  x   /  TBili  3.3<H>  /  DBili  x   /  AST  x   /  ALT  x   /  AlkPhos  x   05-22  chem

## 2023-05-22 NOTE — BH CONSULTATION LIAISON ASSESSMENT NOTE - RISK ASSESSMENT
Low acute risk: Patient denies any suicidal thinking at this time.  She does have depression, anxiety and insomnia.    Increase long-term risk patient has alcohol abuse and depression.    Protective factors: Tried to kill herself, no history of hospitalizations, endorsed reasons to live for her kids, supportive family.    Mitigation of risk: Dual diagnosis program with substance abuse and mood disorder treatment.

## 2023-05-22 NOTE — BH CONSULTATION LIAISON ASSESSMENT NOTE - SUMMARY
This is a 52 years old white  woman with history of alcohol abuse, depression anxiety, no history of psychiatric admissions or suicide attempts, no history of detoxes or rehabs was admitted for alcohol intoxication and detox.    Patient states that she does have depression and she is willing to start a medication.  She also states that she would not go to inpatient rehab but she would accept referral to outpatient rehab.    Patient is not at imminent risk of harm self and others and her acute risk is low.    Patient does not need inpatient psychiatry treatment.    Plan:    Plan discussed with patient and suggested start low-dose of antidepressant Lexapro 5 g daily.  Side effects benefit discussed and patient agrees with plan.    She can take melatonin 5 mg at bedtime for insomnia.     can refer patients to dual diagnosis program [facility].
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
## 2023-05-22 NOTE — PROGRESS NOTE ADULT - SUBJECTIVE AND OBJECTIVE BOX
Patient is a 52y old  Female who presents with a chief complaint of Hematemesis (22 May 2023 00:53)      Subective:  No further bleeding  No pain  Vomited a little bile    PAST MEDICAL & SURGICAL HISTORY:  Chronic alcohol abuse      History of cocaine use  quit in       Former smoker  quit in       Breast cancer  s/p bilateral mastectomy in 2018      2019 novel coronavirus disease (COVID-19)      Cirrhosis      History of bilateral mastectomy      History of           MEDICATIONS  (STANDING):  cefTRIAXone   IVPB 1000 milliGRAM(s) IV Intermittent every 24 hours  folic acid 1 milliGRAM(s) Oral daily  LORazepam   Injectable   IV Push   LORazepam   Injectable 1.5 milliGRAM(s) IV Push every 4 hours  octreotide  Infusion 50 MICROgram(s)/Hr (10 mL/Hr) IV Continuous <Continuous>  pantoprazole Infusion 8 mG/Hr (10 mL/Hr) IV Continuous <Continuous>  thiamine 100 milliGRAM(s) Oral daily    MEDICATIONS  (PRN):      REVIEW OF SYSTEMS:    RESPIRATORY: No shortness of breath  CARDIOVASCULAR: No chest pain  All other review of systems is negative unless indicated above.    Vital Signs Last 24 Hrs  T(C): 37.2 (22 May 2023 04:00), Max: 37.3 (21 May 2023 11:00)  T(F): 98.9 (22 May 2023 04:00), Max: 99.1 (21 May 2023 11:00)  HR: 109 (22 May 2023 07:00) (85 - 120)  BP: 105/61 (22 May 2023 07:00) (86/74 - 120/61)  BP(mean): 72 (22 May 2023 07:00) (55 - 78)  RR: 19 (22 May 2023 07:00) (12 - 24)  SpO2: 99% (22 May 2023 07:00) (91% - 100%)    Parameters below as of 22 May 2023 04:00  Patient On (Oxygen Delivery Method): room air        PHYSICAL EXAM:    Constitutional: NAD, well-developed  Respiratory: CTAB  Cardiovascular: S1 and S2, RRR  Gastrointestinal: BS+, soft, NT/ND  Extremities: No peripheral edema  Psychiatric: Normal mood, normal affect    LABS:                        7.3    3.53  )-----------( 38       ( 22 May 2023 05:19 )             22.5     05-22    135  |  104  |  19  ----------------------------<  148<H>  4.5   |  26  |  0.63    Ca    8.4<L>      22 May 2023 05:19  Phos  2.7       Mg     2.2         TPro  x   /  Alb  x   /  TBili  3.3<H>  /  DBili  x   /  AST  x   /  ALT  x   /  AlkPhos  x       PT/INR - ( 22 May 2023 05:19 )   PT: 17.8 sec;   INR: 1.53 ratio         PTT - ( 21 May 2023 05:48 )  PTT:33.5 sec  LIVER FUNCTIONS - ( 21 May 2023 05:48 )  Alb: 3.1 g/dL / Pro: 8.0 gm/dL / ALK PHOS: 84 U/L / ALT: 30 U/L / AST: 88 U/L / GGT: x             RADIOLOGY & ADDITIONAL STUDIES:

## 2023-05-22 NOTE — BH CONSULTATION LIAISON ASSESSMENT NOTE - NSBHCHARTREVIEWVS_PSY_A_CORE FT
Vital Signs Last 24 Hrs  T(C): 37 (22 May 2023 12:00), Max: 37.2 (22 May 2023 04:00)  T(F): 98.6 (22 May 2023 12:00), Max: 98.9 (22 May 2023 04:00)  HR: 92 (22 May 2023 12:00) (84 - 120)  BP: 85/55 (22 May 2023 12:00) (85/55 - 114/72)  BP(mean): 61 (22 May 2023 12:00) (54 - 78)  RR: 22 (22 May 2023 12:00) (12 - 24)  SpO2: 99% (22 May 2023 12:00) (91% - 100%)    Parameters below as of 22 May 2023 12:00  Patient On (Oxygen Delivery Method): room air

## 2023-05-22 NOTE — BH CONSULTATION LIAISON ASSESSMENT NOTE - NSBHCONSULTFOLLOWAFTERCARE_PSY_A_CORE FT
Patient agrees to be referred to outpatient rehab.   can refer patient to [facility] [place]

## 2023-05-22 NOTE — BH CONSULTATION LIAISON ASSESSMENT NOTE - NSBHSAALC_PSY_A_CORE FT
Patient has history of alcohol abuse, on and off  Sobriety is.  Denies any detoxes and rehabs in the past and denies symptoms of withdrawal in the past.  Currently she drinks every day she does not know how much she takes but she gets drunk every day.

## 2023-05-22 NOTE — BH CONSULTATION LIAISON ASSESSMENT NOTE - CURRENT MEDICATION
MEDICATIONS  (STANDING):  cefTRIAXone Injectable. 1000 milliGRAM(s) IV Push every 24 hours  escitalopram 5 milliGRAM(s) Oral daily  folic acid 1 milliGRAM(s) Oral daily  LORazepam   Injectable   IV Push   LORazepam   Injectable 1.5 milliGRAM(s) IV Push every 4 hours  octreotide  Infusion 50 MICROgram(s)/Hr (10 mL/Hr) IV Continuous <Continuous>  pantoprazole Infusion 8 mG/Hr (10 mL/Hr) IV Continuous <Continuous>  sucralfate suspension 1 Gram(s) Oral four times a day  thiamine 100 milliGRAM(s) Oral daily    MEDICATIONS  (PRN):

## 2023-05-22 NOTE — PROGRESS NOTE ADULT - SUBJECTIVE AND OBJECTIVE BOX
Patient is a 52y old  Female who presents with a chief complaint of Hematemesis (21 May 2023 17:42)    PAST MEDICAL & SURGICAL HISTORY:  Chronic alcohol abuse      History of cocaine use  quit in       Former smoker  quit in 2015      Breast cancer  s/p bilateral mastectomy in 2018      2019 novel coronavirus disease (COVID-19)      Cirrhosis      History of bilateral mastectomy      History of         MITESH HAYDEN   52y    Female    BRIEF HOSPITAL COURSE:    Review of Systems: No hematemesis                       All other ROS are negative.    Allergies    No Known Allergies    Intolerances      Weight (kg): 54.4 (23 @ 05:24)  BMI (kg/m2): 17.2 (23 @ 05:24)    ICU Vital Signs Last 24 Hrs  T(C): 36.8 (22 May 2023 00:00), Max: 37.3 (21 May 2023 11:00)  T(F): 98.3 (22 May 2023 00:00), Max: 99.1 (21 May 2023 11:00)  HR: 85 (22 May 2023 00:00) (85 - 121)  BP: 101/54 (22 May 2023 00:00) (86/74 - 120/61)  BP(mean): 65 (22 May 2023 00:00) (60 - 78)  ABP: --  ABP(mean): --  RR: 20 (22 May 2023 00:00) (12 - 21)  SpO2: 95% (22 May 2023 00:00) (92% - 100%)    O2 Parameters below as of 21 May 2023 20:00  Patient On (Oxygen Delivery Method): room air          Physical Examination:    General:  NAD    HEENT: No JVD    PULM: bilateral BS     CVS: s1 s2 reg    ABD: soft NT no fluid wave    EXT: no edema     SKIN: warm    Neuro: alert awake           LABS:                        8.1    2.93  )-----------( 33       ( 21 May 2023 17:30 )             24.5     05-21    138  |  107  |  19  ----------------------------<  104<H>  4.6   |  21<L>  |  0.45<L>    Ca    7.6<L>      21 May 2023 10:32  Phos  3.3     05-  Mg     2.3     05-    TPro  8.0  /  Alb  3.1<L>  /  TBili  2.2<H>  /  DBili  x   /  AST  88<H>  /  ALT  30  /  AlkPhos  84            CAPILLARY BLOOD GLUCOSE        PT/INR - ( 21 May 2023 05:48 )   PT: 16.8 sec;   INR: 1.44 ratio         PTT - ( 21 May 2023 05:48 )  PTT:33.5 sec  Urinalysis Basic - ( 21 May 2023 17:00 )    Color: Yellow / Appearance: Clear / S.010 / pH: x  Gluc: x / Ketone: Moderate  / Bili: Negative / Urobili: 1   Blood: x / Protein: Negative / Nitrite: Negative   Leuk Esterase: Negative / RBC: x / WBC x   Sq Epi: x / Non Sq Epi: x / Bacteria: x      CULTURES:      Medications:  MEDICATIONS  (STANDING):  cefTRIAXone Injectable. 1000 milliGRAM(s) IV Push every 24 hours  folic acid Injectable 1 milliGRAM(s) IV Push daily  LORazepam   Injectable 2 milliGRAM(s) IV Push every 4 hours  LORazepam   Injectable   IV Push   LORazepam   Injectable 1.5 milliGRAM(s) IV Push every 4 hours  octreotide  Infusion 50 MICROgram(s)/Hr (10 mL/Hr) IV Continuous <Continuous>  pantoprazole Infusion 8 mG/Hr (10 mL/Hr) IV Continuous <Continuous>  thiamine Injectable 100 milliGRAM(s) IV Push daily    MEDICATIONS  (PRN):         @ 07: @ 07:00  --------------------------------------------------------  IN: 0 mL / OUT: 650 mL / NET: -650 mL     @ :  -   @ 00:54  --------------------------------------------------------  IN: 881 mL / OUT: 800 mL / NET: 81 mL        RADIOLOGY/IMAGING/ECHO    < from: CT Abdomen and Pelvis w/ IV Cont (23 @ 06:46) >  IMPRESSION:  Stable pulmonary nodules measuring up to 5 mm.    Cirrhosis with sequela of portal hypertension, particularly patulous   paraesophageal and submucosal esophageal varices.    No acute bowel pathology.    Stable lytic metastases.    < end of copied text >      Assessment/Plan:    52 year old female with a PMH of Breast CA s/p mastectomy in  dx'd with bone mets Dec 2022  Recent XRT    Alcohol abuse disorder with portal HTN and cirrhosis.    Admit yesterday  with hematamesis  UGIB ABLA and thrombocytopenia   EGD with a varix which was banded and a gastric region with stigmata of bleeding that was clipped, (by report)  ?? Dieulafoy lesion         S/P 2 units of packed cells  and 2 SDU plts  no further bleeding.    GI wants to keep the PPI and Octreotide drips going for now.   Advance diet.      Ativan working well for ETOH withdrawal without DT's  no more tremors in the hands.  IV thiamine/folate changed to PO         Social service to assist with out reach.    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    Allergies    No Known Allergies    Intolerances      Weight (kg): 54.4 (23 @ 05:24)  BMI (kg/m2): 17.2 (23 @ 05:24)    ICU Vital Signs Last 24 Hrs  T(C): 36.8 (22 May 2023 00:00), Max: 37.3 (21 May 2023 11:00)  T(F): 98.3 (22 May 2023 00:00), Max: 99.1 (21 May 2023 11:00)  HR: 85 (22 May 2023 00:00) (85 - 121)  BP: 101/54 (22 May 2023 00:00) (86/74 - 120/61)  BP(mean): 65 (22 May 2023 00:00) (60 - 78)  ABP: --  ABP(mean): --  RR: 20 (22 May 2023 00:00) (12 - 21)  SpO2: 95% (22 May 2023 00:00) (92% - 100%)    O2 Parameters below as of 21 May 2023 20:00  Patient On (Oxygen Delivery Method): room air          Physical Examination:    General:  NAD    HEENT: No JVD    PULM: bilateral BS     CVS: s1 s2 reg    ABD: soft NT no fluid wave    EXT: no edema     SKIN: warm    Neuro: alert awake           LABS:                        8.1    2.93  )-----------( 33       ( 21 May 2023 17:30 )             24.5     05-21    138  |  107  |  19  ----------------------------<  104<H>  4.6   |  21<L>  |  0.45<L>    Ca    7.6<L>      21 May 2023 10:32  Phos  3.3     05-  Mg     2.3     05-    TPro  8.0  /  Alb  3.1<L>  /  TBili  2.2<H>  /  DBili  x   /  AST  88<H>  /  ALT  30  /  AlkPhos  84            CAPILLARY BLOOD GLUCOSE        PT/INR - ( 21 May 2023 05:48 )   PT: 16.8 sec;   INR: 1.44 ratio         PTT - ( 21 May 2023 05:48 )  PTT:33.5 sec  Urinalysis Basic - ( 21 May 2023 17:00 )    Color: Yellow / Appearance: Clear / S.010 / pH: x  Gluc: x / Ketone: Moderate  / Bili: Negative / Urobili: 1   Blood: x / Protein: Negative / Nitrite: Negative   Leuk Esterase: Negative / RBC: x / WBC x   Sq Epi: x / Non Sq Epi: x / Bacteria: x      CULTURES:      Medications:  MEDICATIONS  (STANDING):  cefTRIAXone Injectable. 1000 milliGRAM(s) IV Push every 24 hours  folic acid Injectable 1 milliGRAM(s) IV Push daily  LORazepam   Injectable 2 milliGRAM(s) IV Push every 4 hours  LORazepam   Injectable   IV Push   LORazepam   Injectable 1.5 milliGRAM(s) IV Push every 4 hours  octreotide  Infusion 50 MICROgram(s)/Hr (10 mL/Hr) IV Continuous <Continuous>  pantoprazole Infusion 8 mG/Hr (10 mL/Hr) IV Continuous <Continuous>  thiamine Injectable 100 milliGRAM(s) IV Push daily    MEDICATIONS  (PRN):         @ 07: @ 07:00  --------------------------------------------------------  IN: 0 mL / OUT: 650 mL / NET: -650 mL     @ :  -   @ 00:54  --------------------------------------------------------  IN: 881 mL / OUT: 800 mL / NET: 81 mL        RADIOLOGY/IMAGING/ECHO    < from: CT Abdomen and Pelvis w/ IV Cont (23 @ 06:46) >  IMPRESSION:  Stable pulmonary nodules measuring up to 5 mm.    Cirrhosis with sequela of portal hypertension, particularly patulous   paraesophageal and submucosal esophageal varices.    No acute bowel pathology.    Stable lytic metastases.    < end of copied text >      Assessment/Plan:    52 year old female with a PMH of Breast CA s/p mastectomy in  dx'd with bone mets Dec 2022  Recent XRT    Alcohol abuse disorder with portal HTN and cirrhosis.    Admit yesterday  with hematamesis  UGIB ABLA and thrombocytopenia   EGD with a varix which was banded and a gastric region erosion  with the  stigmata of bleeding that was clipped, ?? Dieulafoy lesion         S/P 2 units of packed cells  and 2 SDU plts  no further bleeding.    GI wants to keep the PPI and Octreotide drips going for now.   Advance diet.      Ativan working well for ETOH withdrawal without DT's  no more tremors in the hands.  IV thiamine/folate changed to PO     No active variceal bleeding just banding so will get rid of CTX.  She also has no acites on exam or CT scan         Social service to assist with out reach.    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

## 2023-05-22 NOTE — PROGRESS NOTE ADULT - SUBJECTIVE AND OBJECTIVE BOX
Patient is a 52y old  Female who presents with a chief complaint of Hematemesis (22 May 2023 11:23)    24 hour events:     Allergies    No Known Allergies    Intolerances      REVIEW OF SYSTEMS: SEE BELOW       ICU Vital Signs Last 24 Hrs  T(C): 37 (22 May 2023 12:00), Max: 37.2 (21 May 2023 13:00)  T(F): 98.6 (22 May 2023 12:00), Max: 98.9 (21 May 2023 13:00)  HR: 92 (22 May 2023 12:00) (84 - 120)  BP: 85/55 (22 May 2023 12:00) (85/55 - 115/54)  BP(mean): 61 (22 May 2023 12:00) (54 - 78)  ABP: --  ABP(mean): --  RR: 22 (22 May 2023 12:00) (12 - 24)  SpO2: 99% (22 May 2023 12:00) (91% - 100%)    O2 Parameters below as of 22 May 2023 12:00  Patient On (Oxygen Delivery Method): room air            CAPILLARY BLOOD GLUCOSE          I&O's Summary    21 May 2023 07:01  -  22 May 2023 07:00  --------------------------------------------------------  IN: 1423 mL / OUT: 1800 mL / NET: -377 mL    22 May 2023 07:01  -  22 May 2023 12:34  --------------------------------------------------------  IN: 0 mL / OUT: 700 mL / NET: -700 mL            MEDICATIONS  (STANDING):  cefTRIAXone Injectable. 1000 milliGRAM(s) IV Push every 24 hours  escitalopram 5 milliGRAM(s) Oral daily  folic acid 1 milliGRAM(s) Oral daily  LORazepam   Injectable   IV Push   LORazepam   Injectable 1.5 milliGRAM(s) IV Push every 4 hours  octreotide  Infusion 50 MICROgram(s)/Hr (10 mL/Hr) IV Continuous <Continuous>  pantoprazole Infusion 8 mG/Hr (10 mL/Hr) IV Continuous <Continuous>  sucralfate suspension 1 Gram(s) Oral four times a day  thiamine 100 milliGRAM(s) Oral daily      MEDICATIONS  (PRN):      PHYSICAL EXAM: SEE BELOW                          8.1    4.72  )-----------( 43       ( 22 May 2023 11:45 )             24.4           135  |  104  |  19  ----------------------------<  148<H>  4.5   |  26  |  0.63    Ca    8.4<L>      22 May 2023 05:19  Phos  2.7       Mg     2.2         TPro  x   /  Alb  x   /  TBili  3.3<H>  /  DBili  x   /  AST  x   /  ALT  x   /  AlkPhos  x       Lactate 1.2            @ 17:30          PT/INR - ( 22 May 2023 05:19 )   PT: 17.8 sec;   INR: 1.53 ratio         PTT - ( 21 May 2023 05:48 )  PTT:33.5 sec  Urinalysis Basic - ( 21 May 2023 17:00 )    Color: Yellow / Appearance: Clear / S.010 / pH: x  Gluc: x / Ketone: Moderate  / Bili: Negative / Urobili: 1   Blood: x / Protein: Negative / Nitrite: Negative   Leuk Esterase: Negative / RBC: x / WBC x   Sq Epi: x / Non Sq Epi: x / Bacteria: x      .Blood Blood-Peripheral   No growth to date. --  @ 05:56

## 2023-05-22 NOTE — PROGRESS NOTE ADULT - SUBJECTIVE AND OBJECTIVE BOX
Patient is a 52y old  Female who presents with a chief complaint of Hematemesis (21 May 2023 10:12)    No overnight events. Pt is stable this AM, reports constipation, no BM in 3 days.      Allergies    No Known Allergies    Intolerances      REVIEW OF SYSTEMS: negative except as above      ICU Vital Signs Last 24 Hrs  T(C): 36.9 (22 May 2023 08:00), Max: 37.2 (21 May 2023 13:00)  T(F): 98.4 (22 May 2023 08:00), Max: 98.9 (21 May 2023 13:00)  HR: 90 (22 May 2023 11:00) (84 - 120)  BP: 100/50 (22 May 2023 11:00) (90/46 - 115/54)  BP(mean): 62 (22 May 2023 11:00) (54 - 78)  ABP: --  ABP(mean): --  RR: 16 (22 May 2023 11:00) (12 - 24)  SpO2: 96% (22 May 2023 11:00) (91% - 100%)    O2 Parameters below as of 22 May 2023 11:00  Patient On (Oxygen Delivery Method): room air      I&O's Summary    21 May 2023 07:01  -  22 May 2023 07:00  --------------------------------------------------------  IN: 1423 mL / OUT: 1800 mL / NET: -377 mL    22 May 2023 07:01  -  22 May 2023 11:26  --------------------------------------------------------  IN: 0 mL / OUT: 700 mL / NET: -700 mL        MEDICATIONS  (STANDING):  cefTRIAXone Injectable. 1000 milliGRAM(s) IV Push every 24 hours  folic acid 1 milliGRAM(s) Oral daily  lactulose Syrup 10 Gram(s) Oral once  LORazepam   Injectable   IV Push   LORazepam   Injectable 1.5 milliGRAM(s) IV Push every 4 hours  octreotide  Infusion 50 MICROgram(s)/Hr (10 mL/Hr) IV Continuous <Continuous>  pantoprazole Infusion 8 mG/Hr (10 mL/Hr) IV Continuous <Continuous>  sucralfate suspension 1 Gram(s) Oral four times a day  thiamine 100 milliGRAM(s) Oral daily      PHYSICAL EXAM:       Constitutional: NAD, well-developed  Respiratory: CTAB  Cardiovascular: S1 and S2, RRR  Gastrointestinal: BS+, soft, NT/ND  Extremities: No peripheral edema  Psychiatric: Normal mood, normal affect                                              7.3    3.53  )-----------( 38       ( 22 May 2023 05:19 )             22.5     05-22    135  |  104  |  19  ----------------------------<  148<H>  4.5   |  26  |  0.63    Ca    8.4<L>      22 May 2023 05:19  Phos  2.7     05-22  Mg     2.2     05-22    TPro  x   /  Alb  x   /  TBili  3.3<H>  /  DBili  x   /  AST  x   /  ALT  x   /  AlkPhos  x   05-22    PT/INR - ( 22 May 2023 05:19 )   PT: 17.8 sec;   INR: 1.53 ratio         PTT - ( 21 May 2023 05:48 )  PTT:33.5 sec

## 2023-05-23 LAB
ANION GAP SERPL CALC-SCNC: 5 MMOL/L — SIGNIFICANT CHANGE UP (ref 5–17)
BUN SERPL-MCNC: 18 MG/DL — SIGNIFICANT CHANGE UP (ref 7–23)
CALCIUM SERPL-MCNC: 8.5 MG/DL — SIGNIFICANT CHANGE UP (ref 8.5–10.1)
CHLORIDE SERPL-SCNC: 102 MMOL/L — SIGNIFICANT CHANGE UP (ref 96–108)
CO2 SERPL-SCNC: 29 MMOL/L — SIGNIFICANT CHANGE UP (ref 22–31)
CREAT SERPL-MCNC: 0.67 MG/DL — SIGNIFICANT CHANGE UP (ref 0.5–1.3)
EGFR: 106 ML/MIN/1.73M2 — SIGNIFICANT CHANGE UP
GLUCOSE SERPL-MCNC: 117 MG/DL — HIGH (ref 70–99)
HCT VFR BLD CALC: 23.2 % — LOW (ref 34.5–45)
HCT VFR BLD CALC: 26.2 % — LOW (ref 34.5–45)
HGB BLD-MCNC: 7.6 G/DL — LOW (ref 11.5–15.5)
HGB BLD-MCNC: 8.8 G/DL — LOW (ref 11.5–15.5)
MAGNESIUM SERPL-MCNC: 2.1 MG/DL — SIGNIFICANT CHANGE UP (ref 1.6–2.6)
MCHC RBC-ENTMCNC: 31.9 PG — SIGNIFICANT CHANGE UP (ref 27–34)
MCHC RBC-ENTMCNC: 32.1 PG — SIGNIFICANT CHANGE UP (ref 27–34)
MCHC RBC-ENTMCNC: 32.8 GM/DL — SIGNIFICANT CHANGE UP (ref 32–36)
MCHC RBC-ENTMCNC: 33.6 GM/DL — SIGNIFICANT CHANGE UP (ref 32–36)
MCV RBC AUTO: 94.9 FL — SIGNIFICANT CHANGE UP (ref 80–100)
MCV RBC AUTO: 97.9 FL — SIGNIFICANT CHANGE UP (ref 80–100)
PHOSPHATE SERPL-MCNC: 2.5 MG/DL — SIGNIFICANT CHANGE UP (ref 2.5–4.5)
PLATELET # BLD AUTO: 43 K/UL — LOW (ref 150–400)
PLATELET # BLD AUTO: 45 K/UL — LOW (ref 150–400)
POTASSIUM SERPL-MCNC: 4 MMOL/L — SIGNIFICANT CHANGE UP (ref 3.5–5.3)
POTASSIUM SERPL-SCNC: 4 MMOL/L — SIGNIFICANT CHANGE UP (ref 3.5–5.3)
RBC # BLD: 2.37 M/UL — LOW (ref 3.8–5.2)
RBC # BLD: 2.76 M/UL — LOW (ref 3.8–5.2)
RBC # FLD: 17.4 % — HIGH (ref 10.3–14.5)
RBC # FLD: 19.6 % — HIGH (ref 10.3–14.5)
SODIUM SERPL-SCNC: 136 MMOL/L — SIGNIFICANT CHANGE UP (ref 135–145)
WBC # BLD: 4.15 K/UL — SIGNIFICANT CHANGE UP (ref 3.8–10.5)
WBC # BLD: 4.37 K/UL — SIGNIFICANT CHANGE UP (ref 3.8–10.5)
WBC # FLD AUTO: 4.15 K/UL — SIGNIFICANT CHANGE UP (ref 3.8–10.5)
WBC # FLD AUTO: 4.37 K/UL — SIGNIFICANT CHANGE UP (ref 3.8–10.5)

## 2023-05-23 PROCEDURE — 99233 SBSQ HOSP IP/OBS HIGH 50: CPT

## 2023-05-23 RX ORDER — PANTOPRAZOLE SODIUM 20 MG/1
40 TABLET, DELAYED RELEASE ORAL
Refills: 0 | Status: DISCONTINUED | OUTPATIENT
Start: 2023-05-23 | End: 2023-05-24

## 2023-05-23 RX ORDER — CARVEDILOL PHOSPHATE 80 MG/1
3.12 CAPSULE, EXTENDED RELEASE ORAL EVERY 12 HOURS
Refills: 0 | Status: DISCONTINUED | OUTPATIENT
Start: 2023-05-23 | End: 2023-05-23

## 2023-05-23 RX ADMIN — PANTOPRAZOLE SODIUM 40 MILLIGRAM(S): 20 TABLET, DELAYED RELEASE ORAL at 10:03

## 2023-05-23 RX ADMIN — Medication 1 MILLIGRAM(S): at 10:03

## 2023-05-23 RX ADMIN — Medication 1 MILLIGRAM(S): at 06:30

## 2023-05-23 RX ADMIN — Medication 1 MILLIGRAM(S): at 14:10

## 2023-05-23 RX ADMIN — Medication 100 MILLIGRAM(S): at 10:03

## 2023-05-23 RX ADMIN — Medication 1 GRAM(S): at 13:10

## 2023-05-23 RX ADMIN — ESCITALOPRAM OXALATE 5 MILLIGRAM(S): 10 TABLET, FILM COATED ORAL at 10:11

## 2023-05-23 RX ADMIN — Medication 1 MILLIGRAM(S): at 17:00

## 2023-05-23 RX ADMIN — CEFTRIAXONE 1000 MILLIGRAM(S): 500 INJECTION, POWDER, FOR SOLUTION INTRAMUSCULAR; INTRAVENOUS at 10:04

## 2023-05-23 RX ADMIN — Medication 1 GRAM(S): at 06:29

## 2023-05-23 RX ADMIN — Medication 1.5 MILLIGRAM(S): at 02:16

## 2023-05-23 NOTE — DIETITIAN INITIAL EVALUATION ADULT - ADD RECOMMEND
1) Premier protein shake BID to optimize nutritional needs (provides 160 kcal, 30 g protein/ shake), 2) Encourage protein-rich foods, maximize food preferences, 3) MVI w/ minerals daily to ensure 100% RDA met, 4) c/w thiamine 100 mg and folic acid daily 2/2 poor PO intake/ malnutrition/ ETOH abuse, 5) Monitor bowel movements, if no BM for >3 days, consider implementing bowel regimen, 6) monitor lytes/ min and replete prn, 7) daily wts to track/trend changes, 8) Confirm goals of care regarding nutrition support, 9) Consider adding appetite stimulant such as Remeron or Marinol 2/2 chronically poor appetite/ PO intake. RD will continue to monitor PO intake, labs, hydration, and wt prn.

## 2023-05-23 NOTE — DIETITIAN INITIAL EVALUATION ADULT - OTHER INFO
52 year old female with a PMH of Breast CA s/p mastectomy in 2018 dx'd with bone mets Dec 2022  Recent XRT. Alcohol abuse disorder with portal HTN and cirrhosis EGD done in Dec with: Trace esophogeal varices, Portal gastropathy, Gastric polyp.  She had been sober, but started drinking heavily for the last month about 1/2 liter of vodka/day. Presents with significant hematemesis ABLA  thrombocytopenia hypokalemia.   Admitted w/ UGIB w/ known varices and gastric polyp.     Pt appears very thin with NFPE revealing varied degrees of muscle/ fat wasting. Pt reports poor appetite, minimal PO intake - diet advanced today. Has not had BM for >3 days. Having intermittent vomiting. UBW reported at 135#, RD took bed scale wt on 5/22 at 133#. Pt is being followed by psych. Pt likes drinking ensure clear; willing to trial premier protein shakes BID. Pt meets criteria for PCM. See other recs below.

## 2023-05-23 NOTE — DIETITIAN INITIAL EVALUATION ADULT - ORAL INTAKE PTA/DIET HISTORY
eats multiple small meals throughout the day - states she is not able to consume big meals  over the last 3 days, has had poor PO/ appetite and vomiting  Pt likely not meeting ENN 2/2 ETOH abuse

## 2023-05-23 NOTE — PROGRESS NOTE ADULT - PROVIDER SPECIALTY LIST ADULT
Gastroenterology
Critical Care
Gastroenterology
Critical Care

## 2023-05-23 NOTE — PROGRESS NOTE ADULT - ASSESSMENT
51 y/o female PMH ETOH abuse (was in AA, recent relapse a month ago, drinks vodka daily), alcoholic cirrhosis with portal HTN, metastatic breast ca with b/l mastectomy in 2018, now presents with hematemesis     Admitted for:     Upper GI bleed, variceal   Acute blood loss anemia     Thrombocytopenia from alcoholism     Also has minor alcohol withdrawal syndrome, not in DTs       Plan:     C/W Octreotide and PPI drips, add sucralfate   S/P EGD with banding and clipping   Received 2 PRBCs,  1 Plt, H/H stable   Trend labs  Avoid AC, AP   FLD  Ceftriaxone   Standing and prn Ativan   Thiamine, FA PO  Replete lytes   OOB, PT    psychiatry eval  for  depression symptoms     Patient critically ill 
A:    52F  HD # 1  FULL CODE    Here for:    1. Hemorrhagic shock   2. 2/2 GIB s/p EGD  3. Liver cirrhosis 2/2 alcoholism  4. Portal HTN  5. Esophageal varices 2/2 ~3, 4  6. Alcohol withdrawal syndrome  7. Thrombocytopenia    This patient requires critical care for support of one or more vital organ systems with a high probability of imminent or life threatening deterioration in his/her condition    P:    s/p EGD today with hemostasis  Hemorrhagic shock, ABLA 2/2 GIB, suspected culprit lesion clipped    Hgb 7.6--> 8 s/p 2u pRBC  Plt count 41-->31 despite 1 pool SDP    Ideally maintain Hgb > 8 and Plt > 50k in this scenario    Alcohol withdrawal ativan taper ATC + PRN; hx of alcoholism with alcoholic liver cirrhosis; was sober, began drinking again recently  SW for SBIRT  Remains borderline tachycardic, but improved  Maintain adequate IV access for resuscitative efforts  MELD Na+ 27; GI involved  s/p EGD, appreciated  NPO for now, transition to clears if blood work stable, no further bleeding  Thrombocytopenia likely 2/2 marrow suppression from etoh; s/p 1u SDP, f/u, may require more  Borderline macrocytic anemia; supplement folate  Continue sandostatin in setting of varices to help reduce portal HTN  PPI drip continue  Thiamine  DVT ppx SCD only given GIB  BG < 180    Dispo: Cont critical care.    TOTAL CRITICAL CARE TIME: 42/117 minutes  (EXCLUSIVE of any non bundled procedures)    Note: This time spent INCLUDES time spent directly as this patient's bedside with evaluation, review of chart including review of laboratory and imaging studies, interpretation of vital signs and cardiac output measurements, any necessary ventilator management, and time spent discussing plan of care with patient and family, including goals of care discussion.  
Imp:  Upper GI bleed 2/2 esoph varices and/or gastric cardia dieulafoy's, better after banding and clipping  acknowledges etoh relapse about 1 month ago    Rec:  Cont octreotide and protonix  Add ceftriaxone  Check AFP  Full liquids  Carafate
Imp:  Upper GI bleed 2/2 esoph varices and/or gastric cardia dieulafoy's, better after banding and clipping  acknowledges etoh relapse about 1 month ago  No further bleeding    Rec:  D/C octreotide  Change protonix to PO  Cont ceftriaxone  Carafate  Advance diet  Hold no beta blocker -- BP borderline  Her insurance situation is unclear -- asked her to clarify whether we accept is as she obviously needs close outpatient follow up
51 y/o female PMH ETOH abuse (was in AA, recent relapse a month ago, drinks vodka daily), alcoholic cirrhosis with portal HTN, metastatic breast ca with b/l mastectomy in 2018, now presents with hematemesis     Admitted for:     Upper GI bleed, variceal   Acute blood loss anemia     Thrombocytopenia from alcoholism     Also has minor alcohol withdrawal syndrome, not in DTs       Plan:     Octreotide and PPI drips  EGD by GI today   Received 2 PRBCs, being transfused 1 Plt, H/H stable   Trend labs  Avoid AC, AP   NPO   Ceftriaxone   Standing and prn Ativan   Thiamine, FA   Replete sonal   Will consult psychiatry in am as she endorses depression symptoms     Patient critically ill 
51 y/o female PMH ETOH abuse (was in AA, recent relapse a month ago, drinks vodka daily), alcoholic cirrhosis with portal HTN, metastatic breast ca with b/l mastectomy in 2018, now presents with hematemesis     Admitted for:     Upper GI bleed from a gastric erosion with visible vessel   Acute blood loss anemia     Thrombocytopenia from alcoholism     Also has minor alcohol withdrawal syndrome, not in DTs     EGD 5/21, s/p 1 clip and 1 band       Plan:     No active bleeding but in view of low normal BP and dropping H/H will transfuse 1 PRBC today  Repeat CBC in pm   Off Octreotide and PPI drips per GI   On PPI q12, Carafate   Ceftriaxone   Avoid AC, AP   Diet advanced  Ativan taper   FA, thiamine   Lexapro added by psych   OOB    Will transfer out of ICU later today      updated at bedside 
53 y/o female PMH ETOH abuse (was in AA, recent relapse a month ago, drinks vodka daily), alcoholic cirrhosis with portal HTN, metastatic breast ca with b/l mastectomy in 2018, now presents with hematemesis     Admitted for:     Upper GI bleed, variceal   Acute blood loss anemia     Thrombocytopenia from alcoholism     Also has minor alcohol withdrawal syndrome, not in DTs     EGD 5/21 with bleeding from an erosive site in the stomach - clipped, also had a non-bleeding grade II varix banded       Plan:     No active bleeding   Continue octreotide and PPI drips  Ceftriaxone   Started on full liquid by GI   Order a dose of lactulose for constipation (patient prefers it)   H/H lower today, repeat in pm, transfuse for Hb<7  S/p 2 PRBCs and 2 Plt   Ativan taper   FA, thiamine   Psych eval   OOB, order PT     Keep in ICU today

## 2023-05-23 NOTE — DIETITIAN INITIAL EVALUATION ADULT - NSFNSGIIOFT_GEN_A_CORE
I&O's Detail    22 May 2023 07:01  -  23 May 2023 07:00  --------------------------------------------------------  IN:    IV PiggyBack: 100 mL    Octreotide: 234 mL    Pantoprazole: 231.4 mL  Total IN: 565.4 mL    OUT:    Voided (mL): 2350 mL  Total OUT: 2350 mL    Total NET: -1784.6 mL

## 2023-05-23 NOTE — DIETITIAN INITIAL EVALUATION ADULT - PERTINENT LABORATORY DATA
05-23    136  |  102  |  18  ----------------------------<  117<H>  4.0   |  29  |  0.67    Ca    8.5      23 May 2023 05:44  Phos  2.5     05-23  Mg     2.1     05-23    TPro  x   /  Alb  x   /  TBili  3.3<H>  /  DBili  x   /  AST  x   /  ALT  x   /  AlkPhos  x   05-22

## 2023-05-23 NOTE — PROGRESS NOTE ADULT - SUBJECTIVE AND OBJECTIVE BOX
Patient is a 52y old  Female who presents with a chief complaint of Hematemesis (22 May 2023 12:30)      Subective:  Feels good  no bleeding or pain  no N/V    PAST MEDICAL & SURGICAL HISTORY:  Chronic alcohol abuse      History of cocaine use  quit in       Former smoker  quit in       Breast cancer  s/p bilateral mastectomy in 2018      2019 novel coronavirus disease (COVID-19)      Cirrhosis      History of bilateral mastectomy      History of           MEDICATIONS  (STANDING):  cefTRIAXone Injectable. 1000 milliGRAM(s) IV Push every 24 hours  escitalopram 5 milliGRAM(s) Oral daily  folic acid 1 milliGRAM(s) Oral daily  LORazepam   Injectable   IV Push   LORazepam   Injectable 1 milliGRAM(s) IV Push every 4 hours  pantoprazole    Tablet 40 milliGRAM(s) Oral two times a day  sucralfate suspension 1 Gram(s) Oral four times a day  thiamine 100 milliGRAM(s) Oral daily    MEDICATIONS  (PRN):      REVIEW OF SYSTEMS:    RESPIRATORY: No shortness of breath  CARDIOVASCULAR: No chest pain  All other review of systems is negative unless indicated above.    Vital Signs Last 24 Hrs  T(C): 36.9 (23 May 2023 04:00), Max: 37 (22 May 2023 12:00)  T(F): 98.4 (23 May 2023 04:00), Max: 98.6 (22 May 2023 12:00)  HR: 76 (23 May 2023 07:00) (74 - 98)  BP: 94/56 (23 May 2023 07:00) (85/55 - 108/59)  BP(mean): 65 (23 May 2023 07:00) (53 - 76)  RR: 14 (23 May 2023 07:00) (13 - 22)  SpO2: 92% (23 May 2023 07:00) (92% - 99%)    Parameters below as of 23 May 2023 07:00  Patient On (Oxygen Delivery Method): room air        PHYSICAL EXAM:    Constitutional: NAD, well-developed  Respiratory: CTAB  Cardiovascular: S1 and S2, RRR  Gastrointestinal: BS+, soft, NT/ND  Extremities: No peripheral edema  Psychiatric: Normal mood, normal affect    LABS:                        7.6    4.15  )-----------( 43       ( 23 May 2023 05:44 )             23.2     05-23    136  |  102  |  18  ----------------------------<  117<H>  4.0   |  29  |  0.67    Ca    8.5      23 May 2023 05:44  Phos  2.5       Mg     2.1         TPro  x   /  Alb  x   /  TBili  3.3<H>  /  DBili  x   /  AST  x   /  ALT  x   /  AlkPhos  x       PT/INR - ( 22 May 2023 05:19 )   PT: 17.8 sec;   INR: 1.53 ratio               RADIOLOGY & ADDITIONAL STUDIES:

## 2023-05-23 NOTE — DIETITIAN INITIAL EVALUATION ADULT - PERTINENT MEDS FT
MEDICATIONS  (STANDING):  cefTRIAXone Injectable. 1000 milliGRAM(s) IV Push every 24 hours  escitalopram 5 milliGRAM(s) Oral daily  folic acid 1 milliGRAM(s) Oral daily  LORazepam   Injectable   IV Push   LORazepam   Injectable 1 milliGRAM(s) IV Push every 4 hours  pantoprazole    Tablet 40 milliGRAM(s) Oral two times a day  sucralfate suspension 1 Gram(s) Oral four times a day  thiamine 100 milliGRAM(s) Oral daily

## 2023-05-23 NOTE — PHYSICAL THERAPY INITIAL EVALUATION ADULT - PERTINENT HX OF CURRENT PROBLEM, REHAB EVAL
Presents with significant hematemesis, ABLA, thrombocytopenia, hypokalemia. h/o  Alcohol abuse disorder, had been sober, relapse 1 mo ago, drinking heavily. found w/ Upper GI bleed 2/2 esoph varices and/or gastric cardia dieulafoy's, underwent banding and clipping 5/21, transfused.   No further bleeding per GI note 5/22.  H/h today 7.6/23.2. transfusion ordered.   PMH of Breast CA s/p mastectomy in 2018 dx'd with bone mets Dec 2022  Recent XRT

## 2023-05-23 NOTE — PROGRESS NOTE ADULT - SUBJECTIVE AND OBJECTIVE BOX
Patient is a 52y old  Female who presents with a chief complaint of Hematemesis (23 May 2023 08:45)    24 hour events:     Allergies    No Known Allergies    Intolerances      REVIEW OF SYSTEMS: SEE BELOW       ICU Vital Signs Last 24 Hrs  T(C): 36.8 (23 May 2023 12:00), Max: 36.9 (23 May 2023 04:00)  T(F): 98.2 (23 May 2023 12:00), Max: 98.4 (23 May 2023 04:00)  HR: 75 (23 May 2023 12:00) (74 - 98)  BP: 94/47 (23 May 2023 12:00) (88/72 - 125/90)  BP(mean): 58 (23 May 2023 12:00) (53 - 99)  ABP: --  ABP(mean): --  RR: 13 (23 May 2023 12:00) (13 - 21)  SpO2: 96% (23 May 2023 12:00) (92% - 100%)    O2 Parameters below as of 23 May 2023 08:00  Patient On (Oxygen Delivery Method): room air            CAPILLARY BLOOD GLUCOSE          I&O's Summary    22 May 2023 07:01  -  23 May 2023 07:00  --------------------------------------------------------  IN: 565.4 mL / OUT: 2350 mL / NET: -1784.6 mL            MEDICATIONS  (STANDING):  cefTRIAXone Injectable. 1000 milliGRAM(s) IV Push every 24 hours  escitalopram 5 milliGRAM(s) Oral daily  folic acid 1 milliGRAM(s) Oral daily  LORazepam   Injectable   IV Push   LORazepam   Injectable 1 milliGRAM(s) IV Push every 4 hours  pantoprazole    Tablet 40 milliGRAM(s) Oral two times a day  sucralfate suspension 1 Gram(s) Oral four times a day  thiamine 100 milliGRAM(s) Oral daily      MEDICATIONS  (PRN):      PHYSICAL EXAM: SEE BELOW                          7.6    4.15  )-----------( 43       ( 23 May 2023 05:44 )             23.2       05-23    136  |  102  |  18  ----------------------------<  117<H>  4.0   |  29  |  0.67    Ca    8.5      23 May 2023 05:44  Phos  2.5       Mg     2.1         TPro  x   /  Alb  x   /  TBili  3.3<H>  /  DBili  x   /  AST  x   /  ALT  x   /  AlkPhos  x             PT/INR - ( 22 May 2023 05:19 )   PT: 17.8 sec;   INR: 1.53 ratio           Urinalysis Basic - ( 21 May 2023 17:00 )    Color: Yellow / Appearance: Clear / S.010 / pH: x  Gluc: x / Ketone: Moderate  / Bili: Negative / Urobili: 1   Blood: x / Protein: Negative / Nitrite: Negative   Leuk Esterase: Negative / RBC: x / WBC x   Sq Epi: x / Non Sq Epi: x / Bacteria: x      Clean Catch Clean Catch (Midstream)   <10,000 CFU/mL Normal Urogenital Aminta --  @ 17:00  .Blood Blood-Peripheral   No growth to date. --  @ 05:56

## 2023-05-24 VITALS — HEART RATE: 105 BPM | RESPIRATION RATE: 15 BRPM | DIASTOLIC BLOOD PRESSURE: 68 MMHG | SYSTOLIC BLOOD PRESSURE: 116 MMHG

## 2023-05-24 RX ORDER — EXEMESTANE 25 MG/1
1 TABLET, SUGAR COATED ORAL
Refills: 0 | DISCHARGE

## 2023-05-24 RX ORDER — ESCITALOPRAM OXALATE 10 MG/1
1 TABLET, FILM COATED ORAL
Qty: 30 | Refills: 0
Start: 2023-05-24 | End: 2023-06-22

## 2023-05-24 RX ORDER — SUCRALFATE 1 G
1 TABLET ORAL
Qty: 56 | Refills: 0
Start: 2023-05-24 | End: 2023-06-06

## 2023-05-24 RX ORDER — PANTOPRAZOLE SODIUM 20 MG/1
1 TABLET, DELAYED RELEASE ORAL
Qty: 28 | Refills: 0
Start: 2023-05-24 | End: 2023-06-06

## 2023-05-24 NOTE — CHART NOTE - NSCHARTNOTEFT_GEN_A_CORE
Patient wants to leave and go home, I explained to her that she is very close to being discharged by us however she does not want to wait and wants to leave now. She refused vital signs and blood work today. She is refusing meds. She is not confused or agitated but appears angry, says she has been here long enough and just wants to go home. I explained the risks of worsening ETOH symptoms, seizure, loss of consciousness, trauma, bleeding, death etc. however she is adamant on leaving. When I asked her how will she get home she said she will take a cab, I then called her  Spenser and explained the situation, he agreed to come pick her up and she decided to stay until then. Prescriptions for protonix, carafate and lexapro sent to her pharmacy. She is advised to f/u with GI, PCP and psychiatry in 1-2 weeks.  Spenser updated also.

## 2023-06-01 DIAGNOSIS — E43 UNSPECIFIED SEVERE PROTEIN-CALORIE MALNUTRITION: ICD-10-CM

## 2023-06-01 DIAGNOSIS — F32.A DEPRESSION, UNSPECIFIED: ICD-10-CM

## 2023-06-01 DIAGNOSIS — K59.00 CONSTIPATION, UNSPECIFIED: ICD-10-CM

## 2023-06-01 DIAGNOSIS — K22.89 OTHER SPECIFIED DISEASE OF ESOPHAGUS: ICD-10-CM

## 2023-06-01 DIAGNOSIS — E87.6 HYPOKALEMIA: ICD-10-CM

## 2023-06-01 DIAGNOSIS — Z92.3 PERSONAL HISTORY OF IRRADIATION: ICD-10-CM

## 2023-06-01 DIAGNOSIS — Z87.891 PERSONAL HISTORY OF NICOTINE DEPENDENCE: ICD-10-CM

## 2023-06-01 DIAGNOSIS — F10.230 ALCOHOL DEPENDENCE WITH WITHDRAWAL, UNCOMPLICATED: ICD-10-CM

## 2023-06-01 DIAGNOSIS — I85.11 SECONDARY ESOPHAGEAL VARICES WITH BLEEDING: ICD-10-CM

## 2023-06-01 DIAGNOSIS — K31.89 OTHER DISEASES OF STOMACH AND DUODENUM: ICD-10-CM

## 2023-06-01 DIAGNOSIS — D69.59 OTHER SECONDARY THROMBOCYTOPENIA: ICD-10-CM

## 2023-06-01 DIAGNOSIS — K70.30 ALCOHOLIC CIRRHOSIS OF LIVER WITHOUT ASCITES: ICD-10-CM

## 2023-06-01 DIAGNOSIS — K31.7 POLYP OF STOMACH AND DUODENUM: ICD-10-CM

## 2023-06-01 DIAGNOSIS — C79.51 SECONDARY MALIGNANT NEOPLASM OF BONE: ICD-10-CM

## 2023-06-01 DIAGNOSIS — Z90.13 ACQUIRED ABSENCE OF BILATERAL BREASTS AND NIPPLES: ICD-10-CM

## 2023-06-01 DIAGNOSIS — R57.8 OTHER SHOCK: ICD-10-CM

## 2023-06-01 DIAGNOSIS — D62 ACUTE POSTHEMORRHAGIC ANEMIA: ICD-10-CM

## 2023-06-01 DIAGNOSIS — F41.9 ANXIETY DISORDER, UNSPECIFIED: ICD-10-CM

## 2023-06-01 DIAGNOSIS — Z85.3 PERSONAL HISTORY OF MALIGNANT NEOPLASM OF BREAST: ICD-10-CM

## 2023-06-01 DIAGNOSIS — Z86.16 PERSONAL HISTORY OF COVID-19: ICD-10-CM

## 2023-06-01 DIAGNOSIS — K76.6 PORTAL HYPERTENSION: ICD-10-CM

## 2023-06-14 NOTE — CHART NOTE - NSCHARTNOTEFT_GEN_A_CORE
HIM Query received to clarify protein calorie malnutrition    Response - per RD's assessment on 5/23/23, patient has severe protein calorie malnutrition

## 2023-07-19 ENCOUNTER — EMERGENCY (EMERGENCY)
Facility: HOSPITAL | Age: 53
LOS: 0 days | Discharge: HOME | End: 2023-07-19
Attending: STUDENT IN AN ORGANIZED HEALTH CARE EDUCATION/TRAINING PROGRAM
Payer: COMMERCIAL

## 2023-07-19 VITALS — WEIGHT: 139.99 LBS | HEIGHT: 70 IN

## 2023-07-19 VITALS
HEART RATE: 105 BPM | TEMPERATURE: 98 F | RESPIRATION RATE: 18 BRPM | OXYGEN SATURATION: 96 % | DIASTOLIC BLOOD PRESSURE: 83 MMHG | SYSTOLIC BLOOD PRESSURE: 127 MMHG

## 2023-07-19 DIAGNOSIS — D18.01 HEMANGIOMA OF SKIN AND SUBCUTANEOUS TISSUE: ICD-10-CM

## 2023-07-19 DIAGNOSIS — Z90.13 ACQUIRED ABSENCE OF BILATERAL BREASTS AND NIPPLES: ICD-10-CM

## 2023-07-19 DIAGNOSIS — Z86.16 PERSONAL HISTORY OF COVID-19: ICD-10-CM

## 2023-07-19 DIAGNOSIS — Z98.891 HISTORY OF UTERINE SCAR FROM PREVIOUS SURGERY: Chronic | ICD-10-CM

## 2023-07-19 DIAGNOSIS — R04.0 EPISTAXIS: ICD-10-CM

## 2023-07-19 DIAGNOSIS — R21 RASH AND OTHER NONSPECIFIC SKIN ERUPTION: ICD-10-CM

## 2023-07-19 DIAGNOSIS — Z85.830 PERSONAL HISTORY OF MALIGNANT NEOPLASM OF BONE: ICD-10-CM

## 2023-07-19 DIAGNOSIS — Z85.3 PERSONAL HISTORY OF MALIGNANT NEOPLASM OF BREAST: ICD-10-CM

## 2023-07-19 DIAGNOSIS — F10.10 ALCOHOL ABUSE, UNCOMPLICATED: ICD-10-CM

## 2023-07-19 DIAGNOSIS — Z87.891 PERSONAL HISTORY OF NICOTINE DEPENDENCE: ICD-10-CM

## 2023-07-19 DIAGNOSIS — Z90.13 ACQUIRED ABSENCE OF BILATERAL BREASTS AND NIPPLES: Chronic | ICD-10-CM

## 2023-07-19 DIAGNOSIS — Z87.59 PERSONAL HISTORY OF OTHER COMPLICATIONS OF PREGNANCY, CHILDBIRTH AND THE PUERPERIUM: ICD-10-CM

## 2023-07-19 LAB
ALBUMIN SERPL ELPH-MCNC: 3.9 G/DL — SIGNIFICANT CHANGE UP (ref 3.3–5)
ALP SERPL-CCNC: 110 U/L — SIGNIFICANT CHANGE UP (ref 40–120)
ALT FLD-CCNC: 33 U/L — SIGNIFICANT CHANGE UP (ref 12–78)
ANION GAP SERPL CALC-SCNC: 8 MMOL/L — SIGNIFICANT CHANGE UP (ref 5–17)
APTT BLD: 35 SEC — SIGNIFICANT CHANGE UP (ref 27.5–35.5)
AST SERPL-CCNC: 74 U/L — HIGH (ref 15–37)
BASOPHILS # BLD AUTO: 0.03 K/UL — SIGNIFICANT CHANGE UP (ref 0–0.2)
BASOPHILS NFR BLD AUTO: 0.7 % — SIGNIFICANT CHANGE UP (ref 0–2)
BILIRUB SERPL-MCNC: 1.9 MG/DL — HIGH (ref 0.2–1.2)
BLD GP AB SCN SERPL QL: SIGNIFICANT CHANGE UP
BUN SERPL-MCNC: 11 MG/DL — SIGNIFICANT CHANGE UP (ref 7–23)
CALCIUM SERPL-MCNC: 8.9 MG/DL — SIGNIFICANT CHANGE UP (ref 8.5–10.1)
CHLORIDE SERPL-SCNC: 103 MMOL/L — SIGNIFICANT CHANGE UP (ref 96–108)
CO2 SERPL-SCNC: 26 MMOL/L — SIGNIFICANT CHANGE UP (ref 22–31)
CREAT SERPL-MCNC: 0.61 MG/DL — SIGNIFICANT CHANGE UP (ref 0.5–1.3)
EGFR: 109 ML/MIN/1.73M2 — SIGNIFICANT CHANGE UP
EOSINOPHIL # BLD AUTO: 0.07 K/UL — SIGNIFICANT CHANGE UP (ref 0–0.5)
EOSINOPHIL NFR BLD AUTO: 1.6 % — SIGNIFICANT CHANGE UP (ref 0–6)
GLUCOSE SERPL-MCNC: 116 MG/DL — HIGH (ref 70–99)
HCT VFR BLD CALC: 27.7 % — LOW (ref 34.5–45)
HGB BLD-MCNC: 9.3 G/DL — LOW (ref 11.5–15.5)
IMM GRANULOCYTES NFR BLD AUTO: 0.2 % — SIGNIFICANT CHANGE UP (ref 0–0.9)
INR BLD: 1.36 RATIO — HIGH (ref 0.88–1.16)
LYMPHOCYTES # BLD AUTO: 1.14 K/UL — SIGNIFICANT CHANGE UP (ref 1–3.3)
LYMPHOCYTES # BLD AUTO: 26 % — SIGNIFICANT CHANGE UP (ref 13–44)
MANUAL SMEAR VERIFICATION: SIGNIFICANT CHANGE UP
MCHC RBC-ENTMCNC: 30.4 PG — SIGNIFICANT CHANGE UP (ref 27–34)
MCHC RBC-ENTMCNC: 33.6 GM/DL — SIGNIFICANT CHANGE UP (ref 32–36)
MCV RBC AUTO: 90.5 FL — SIGNIFICANT CHANGE UP (ref 80–100)
MONOCYTES # BLD AUTO: 0.25 K/UL — SIGNIFICANT CHANGE UP (ref 0–0.9)
MONOCYTES NFR BLD AUTO: 5.7 % — SIGNIFICANT CHANGE UP (ref 2–14)
NEUTROPHILS # BLD AUTO: 2.88 K/UL — SIGNIFICANT CHANGE UP (ref 1.8–7.4)
NEUTROPHILS NFR BLD AUTO: 65.8 % — SIGNIFICANT CHANGE UP (ref 43–77)
PLAT MORPH BLD: NORMAL — SIGNIFICANT CHANGE UP
PLATELET # BLD AUTO: 77 K/UL — LOW (ref 150–400)
POTASSIUM SERPL-MCNC: 3.3 MMOL/L — LOW (ref 3.5–5.3)
POTASSIUM SERPL-SCNC: 3.3 MMOL/L — LOW (ref 3.5–5.3)
PROT SERPL-MCNC: 9.4 GM/DL — HIGH (ref 6–8.3)
PROTHROM AB SERPL-ACNC: 15.8 SEC — HIGH (ref 10.5–13.4)
RBC # BLD: 3.06 M/UL — LOW (ref 3.8–5.2)
RBC # FLD: 16.7 % — HIGH (ref 10.3–14.5)
RBC BLD AUTO: NORMAL — SIGNIFICANT CHANGE UP
SODIUM SERPL-SCNC: 137 MMOL/L — SIGNIFICANT CHANGE UP (ref 135–145)
WBC # BLD: 4.38 K/UL — SIGNIFICANT CHANGE UP (ref 3.8–10.5)
WBC # FLD AUTO: 4.38 K/UL — SIGNIFICANT CHANGE UP (ref 3.8–10.5)

## 2023-07-19 PROCEDURE — 86901 BLOOD TYPING SEROLOGIC RH(D): CPT

## 2023-07-19 PROCEDURE — 99284 EMERGENCY DEPT VISIT MOD MDM: CPT

## 2023-07-19 PROCEDURE — 85025 COMPLETE CBC W/AUTO DIFF WBC: CPT

## 2023-07-19 PROCEDURE — 93010 ELECTROCARDIOGRAM REPORT: CPT

## 2023-07-19 PROCEDURE — 36415 COLL VENOUS BLD VENIPUNCTURE: CPT

## 2023-07-19 PROCEDURE — 80053 COMPREHEN METABOLIC PANEL: CPT

## 2023-07-19 PROCEDURE — 99283 EMERGENCY DEPT VISIT LOW MDM: CPT

## 2023-07-19 PROCEDURE — 85610 PROTHROMBIN TIME: CPT

## 2023-07-19 PROCEDURE — 85730 THROMBOPLASTIN TIME PARTIAL: CPT

## 2023-07-19 PROCEDURE — 86850 RBC ANTIBODY SCREEN: CPT

## 2023-07-19 PROCEDURE — 93005 ELECTROCARDIOGRAM TRACING: CPT

## 2023-07-19 PROCEDURE — 86900 BLOOD TYPING SEROLOGIC ABO: CPT

## 2023-07-19 RX ORDER — OXYMETAZOLINE HYDROCHLORIDE 0.5 MG/ML
1 SPRAY NASAL ONCE
Refills: 0 | Status: COMPLETED | OUTPATIENT
Start: 2023-07-19 | End: 2023-07-19

## 2023-07-19 RX ORDER — POTASSIUM CHLORIDE 20 MEQ
20 PACKET (EA) ORAL ONCE
Refills: 0 | Status: COMPLETED | OUTPATIENT
Start: 2023-07-19 | End: 2023-07-19

## 2023-07-19 RX ADMIN — Medication 20 MILLIEQUIVALENT(S): at 14:24

## 2023-07-19 RX ADMIN — OXYMETAZOLINE HYDROCHLORIDE 1 SPRAY(S): 0.5 SPRAY NASAL at 14:23

## 2023-07-19 NOTE — ED STATDOCS - NS_ ATTENDINGSCRIBEDETAILS _ED_A_ED_FT
The scribe's documentation has been prepared under my direction and personally reviewed by me in its entirety. I confirm that the note above accurately reflects all work, treatment, procedures, and medical decision making performed by me.  GIGI Brush-MS, MD  Internal/Emergency/Critical Care Medicine

## 2023-07-19 NOTE — ED STATDOCS - ATTENDING APP SHARED VISIT CONTRIBUTION OF CARE
I personally saw the patient with the PA, and completed the key components of the history and physical exam. I then discussed the management plan with the PA.  GIGI Brush-MS, MD  Internal/Emergency/Critical Care Medicine

## 2023-07-19 NOTE — ED STATDOCS - OBJECTIVE STATEMENT
51 y/o female w/ PMHx of stage 4 metastatic breast CA, b/l mastectomy, liver cirrhosis, EtOH abuse presents to the ED c/o intermittent epistaxis x2 days. Denies blood thinners. Pt also endorses intermittent rash on chest. NKDA. Pt states she drinks alcohol almost daily. Denies hematemesis, denies bloody stools. Denies any recent trauma to her nose. Denies any B/B issues.

## 2023-07-19 NOTE — ED ADULT NURSE REASSESSMENT NOTE - NS ED NURSE REASSESS COMMENT FT1
report received from previous rn. color good, skin warm and dry, respirations even and unlabored. patient able to speak in full sentences. patient in no acute distress. patient pending medication administration and discharge.

## 2023-07-19 NOTE — ED STATDOCS - PATIENT PORTAL LINK FT
You can access the FollowMyHealth Patient Portal offered by [facility] by registering at the following website: [email or web address]. By joining [employer]’s FollowMyHealth portal, you will also be able to view your health information using other applications (apps) compatible with our system.

## 2023-07-19 NOTE — ED STATDOCS - CLINICAL SUMMARY MEDICAL DECISION MAKING FREE TEXT BOX
53 y/o female w/ PMHx of metastatic breast CA to bone and EtOH cirrhosis who p/w intermittent epistaxis, likely in the setting of chronic underlying coagulopathy from cirrhosis, currently no active bleeding, rash likely in the setting of her EtOH cirrhosis/ thrombocytopenia. Pt appearing well on exam.  Will get:  CBC, CMP, coags, T&S  Will order oxymetazoline as needed if having nose bleeding her  if INR above 2 will give one dose of PO vitamin K 5mg, as long as platelets not below 20 pt likely to be able to go home with instructions given for epistaxis control, possible vit K supplementation and f/u/w GI and oncologist as an o/p.

## 2023-07-19 NOTE — ED STATDOCS - CARE PROVIDER_API CALL
Jose Farris  Otolaryngology  [address] [place] [address], [address]  [place] [address]  Phone: [phone]  Fax: [phone]  Follow Up Time:

## 2023-07-19 NOTE — ED ADULT TRIAGE NOTE - CHIEF COMPLAINT QUOTE
Pt presented to the ER with c/o on and off nose bleed for the past 2 days. Pt reported that her INR has been high in the past. Pt denies any blood thinner use. Pt also reported SOB at times. Pt has an on and off rash to the front of her chest. Pt has a hx of stage 4 mets cancer.

## 2023-07-19 NOTE — ED STATDOCS - PROGRESS NOTE DETAILS
patient with improved platelets, INR and hemoglobin from previous visits here.  Advised ENT f/u and return precautions if persistent problems with nose bleeds.  Advised home management of nose bleeds -Milo Christie PA-C

## 2023-07-27 NOTE — ED ADULT NURSE NOTE - MUSCULOSKELETAL ASSESSMENT
Pt confirmed taking 5mg , 7/24 and 7/25, and took 1mg 2/26. No diet or medication changes, no s/sx of bleeding noted.  
INR not at goal. Medications, chart, and patient findings reviewed. See calendar for adjustments to dose and follow up plan.    
Pt advised to take 2mg on Friday and 1mg all other days. INR on 7/31. Pt verbalized understanding.     
- - -

## 2023-08-14 ENCOUNTER — APPOINTMENT (OUTPATIENT)
Dept: RADIATION ONCOLOGY | Facility: CLINIC | Age: 53
End: 2023-08-14

## 2023-08-18 NOTE — HISTORY OF PRESENT ILLNESS
[FreeTextEntry1] : This is a 53 year old female diagnosed with metastatic breast cancer referred by Dr. Hughes.   She was first diagnosed with node-negative left breast cancer in 2018 and underwent bilateral mastectomies with Dr. Parson. No chemo, hormonal therapy, or radiation given. She has a history of cocaine abuse (quit in 1999), alcohol abuse (quit and is in AA), and cirrhosis with esophageal varices.   She presented to  in November 2022 with SOB, coughing, abdominal distention, and a 16 pound weight loss.   CT A/P and CT angio chest performed on 11/28/22 showed no PE. Small left pleural effusion. Cirrhosis, splenomegaly, portal hypertension and moderate ascites. Esophageal varices. Diffuse lytic change of the sternum suspicious for metastatic disease.   Diagnostic paracentesis performed on 11/29/22 was negative for malignant cells.   Colonoscopy/EGD performed on 12/1/22 was negative.   Sternal mass biopsy performed on 12/27/22 showed metastatic adenocarcinoma consistent with patient's known breast primary. ER positive, VA negative, HER2 negative by FISH.   PET scan performed on 1/17/23 showed no evidence of locally recurrent FDG avid breast malignancy. Destructive lesion in the sternum with increased activity compatible with biopsied osseous metastasis. L5 with lucent changes and focal activity where additional site of disease involvement is suspected. Interval resolution of left pleural effusion and marked improvement in abdominopelvic ascites from 11/2022 CT. Mild splenomegaly with physiologic degree of activity.  States she has lost a total of 25 pounds over the past one year.   In summary, she has a history of left breast cancer s/p mastectomy with implant reconstruction in 2018. Prophylactic right mastectomy also done at that time. She received no adjuvant postoperative RT, chemotx, or hormonal therapy. Now found to have metastatic disease in sternum, biopsy-proven to be breast ca ER+ VA neg Her2 neg, and in L5 vertebra.   Patient has started Letrozole and denosumab.  She states she has pains throughout her body, as well as in the lower back and sternum. She is to speak with Dr. Hughes regarding her Letrozole and body pains. States she is not able to get into a comfortable position and is not sleeping. Not taking pain medication as she has a previous history of substance abuse and does not wish to take anything.   She completed SBRT for oligometastatic disease in the sternum and L5, total dose of 2700 cGy in 3 fx, on 3/8/23.   She presents for a 5 month follow up. She states she continues to have back pain. Not taking pain medication. Using Phys Assist Oncology cream. She is no longer taking Letrozole (unable to tolerate), began exemestane.Dr. Mendoza plans denosumab as well, pending dental clearance.  Patietn states she is having anxiety about her prognosis and is more emotional. Has not followed up with Dr. Hughes.

## 2023-08-23 ENCOUNTER — APPOINTMENT (OUTPATIENT)
Dept: RADIATION ONCOLOGY | Facility: CLINIC | Age: 53
End: 2023-08-23

## 2023-09-26 RX ORDER — LETROZOLE TABLETS 2.5 MG/1
2.5 TABLET, FILM COATED ORAL
Qty: 90 | Refills: 3 | Status: DISCONTINUED | COMMUNITY
Start: 2023-01-20 | End: 2023-09-26

## 2023-12-01 ENCOUNTER — OUTPATIENT (OUTPATIENT)
Dept: OUTPATIENT SERVICES | Facility: HOSPITAL | Age: 53
LOS: 1 days | Discharge: HOME | End: 2023-12-01

## 2023-12-01 DIAGNOSIS — Z90.13 ACQUIRED ABSENCE OF BILATERAL BREASTS AND NIPPLES: Chronic | ICD-10-CM

## 2023-12-01 DIAGNOSIS — D64.9 ANEMIA, UNSPECIFIED: ICD-10-CM

## 2023-12-01 DIAGNOSIS — Z98.891 HISTORY OF UTERINE SCAR FROM PREVIOUS SURGERY: Chronic | ICD-10-CM

## 2023-12-04 ENCOUNTER — APPOINTMENT (OUTPATIENT)
Dept: HEMATOLOGY ONCOLOGY | Facility: CLINIC | Age: 53
End: 2023-12-04
Payer: COMMERCIAL

## 2023-12-12 ENCOUNTER — NON-APPOINTMENT (OUTPATIENT)
Age: 53
End: 2023-12-12

## 2023-12-12 ENCOUNTER — APPOINTMENT (OUTPATIENT)
Dept: INTERNAL MEDICINE | Facility: CLINIC | Age: 53
End: 2023-12-12
Payer: COMMERCIAL

## 2023-12-12 VITALS
RESPIRATION RATE: 18 BRPM | HEIGHT: 68 IN | WEIGHT: 145 LBS | SYSTOLIC BLOOD PRESSURE: 110 MMHG | DIASTOLIC BLOOD PRESSURE: 80 MMHG | HEART RATE: 97 BPM | OXYGEN SATURATION: 99 % | TEMPERATURE: 98.5 F | BODY MASS INDEX: 21.98 KG/M2

## 2023-12-12 PROCEDURE — 99215 OFFICE O/P EST HI 40 MIN: CPT | Mod: 25

## 2023-12-12 PROCEDURE — 90677 PCV20 VACCINE IM: CPT

## 2023-12-12 PROCEDURE — G0009: CPT

## 2024-01-08 ENCOUNTER — APPOINTMENT (OUTPATIENT)
Dept: VASCULAR SURGERY | Facility: CLINIC | Age: 54
End: 2024-01-08

## 2024-01-17 ENCOUNTER — APPOINTMENT (OUTPATIENT)
Dept: GASTROENTEROLOGY | Facility: CLINIC | Age: 54
End: 2024-01-17
Payer: MEDICAID

## 2024-01-17 VITALS
WEIGHT: 138 LBS | HEIGHT: 68 IN | SYSTOLIC BLOOD PRESSURE: 104 MMHG | BODY MASS INDEX: 20.92 KG/M2 | DIASTOLIC BLOOD PRESSURE: 66 MMHG

## 2024-01-17 DIAGNOSIS — F10.90 ALCOHOL USE, UNSPECIFIED, UNCOMPLICATED: ICD-10-CM

## 2024-01-17 DIAGNOSIS — I85.00 ESOPHAGEAL VARICES W/OUT BLEEDING: ICD-10-CM

## 2024-01-17 DIAGNOSIS — K76.6 PORTAL HYPERTENSION: ICD-10-CM

## 2024-01-17 DIAGNOSIS — F32.A DEPRESSION, UNSPECIFIED: ICD-10-CM

## 2024-01-17 PROCEDURE — 99204 OFFICE O/P NEW MOD 45 MIN: CPT

## 2024-01-18 PROBLEM — K76.6 PORTAL HYPERTENSION: Status: ACTIVE | Noted: 2022-12-07

## 2024-01-18 PROBLEM — F32.A DEPRESSION, UNSPECIFIED DEPRESSION TYPE: Status: ACTIVE | Noted: 2023-12-12

## 2024-01-18 NOTE — CONSULT LETTER
[Dear  ___] : Dear  [unfilled], [Consult Letter:] : I had the pleasure of evaluating your patient, [unfilled]. [Please see my note below.] : Please see my note below. [Consult Closing:] : Thank you very much for allowing me to participate in the care of this patient.  If you have any questions, please do not hesitate to contact me. [Sincerely,] : Sincerely, [FreeTextEntry3] : Dr. Carolyn Hernandez

## 2024-01-18 NOTE — ASSESSMENT
[FreeTextEntry1] : Pleasant 53 year old woman with a Hx of breast Ca [in 2018 s/p mastectomy (Dr. Parson, no chemo/RT)], hx of Cocaine use (quit in 1999), former smoker (quit in 2015), alcohol abuse disorder (no history of seizures/delirium tremens.), cirrhosis with esophageal varices, and diagnosed with metastatic breast cancer in 2022 presents with abdominal pain. Pt on Spironolactone 25 mg qid and Furosemide 40 mg/day. Hemoglobin 8.0 g/dL on January 2023 blood work. Reports regular ETOH use. The pt wishes to enter a rehabilitation program as soon as is possible. Reports does not have regular BMs. Recommend that the pt limit salt intake to 2 g/day, avoid NSAIDs, avoid ETOH, aim to have 2-3 BMs/day using [employer] Milk of Magnesia, and continue plan to enter alcohol detox program at [facility] after today's visit. Also recommend that the pt schedule an EGD.  The patient will proceed with EGD. I explained to the patient the risks, alternatives and benefits to an EGD. Risk including but not limited to bleeding, perforation, infection adverse medication reaction. Questions were answered. agreeable to proceed with the planned procedures.   The patient will hold NSAIDs for 5 days prior. Otherwise continue medications as prescribed. The patient is not on diabetes medications or anticoagulation.   Patient seen and examined, overseeing documentation by Melissa PHELPS Will proceed with an EGD. Medications as usual. Reviewed and reconciled medications, allergies, PMHx, PSHx, SocHx, FMHx. Reviewed imaging, blood work, diagnostic testing, discussed with patient. Further recommendations pending results of above work-up and evaluation.   All questions answered Call with any questions or concerns Time spent before and after visit reviewing patient's chart

## 2024-01-18 NOTE — PHYSICAL EXAM
[Hearing Threshold Finger Rub Not [place]] : hearing was normal [Normal Appearance] : the appearance of the neck was normal [No Respiratory Distress] : no respiratory distress [Normal] : heart rate was normal and rhythm regular, normal S1 and S2, no murmurs [None] : no edema [Abdomen Tenderness] : non-tender [No Masses] : no abdominal mass palpated [Abdomen Soft] : soft [Oriented To Time, Place, And Person] : oriented to person, place, and time [Ascites: ___] : no ascites [de-identified] : no asterixis

## 2024-01-18 NOTE — HISTORY OF PRESENT ILLNESS
[FreeTextEntry1] : Pleasant 53 year old woman with a Hx of breast Ca [in 2018, s/p mastectomy (Dr. Parson, no chemo/RT)], hx of Cocaine use (quit in 1999), former smoker (quit in 2015), alcohol abuse disorder (no history of seizures/delirium tremens.), Cirrhosis with esophageal varices, and diagnosed with metastatic breast cancer in 2022 presents with abdominal pain. Pt on Spironolactone 25 mg qid and Furosemide 40 mg/day. Reports eats pickles occasionally. Hemoglobin 8.0 g/dL on January 2023 blood work. Reports regular ETOH use. The pt wishes to enter a rehabilitation program as soon as is possible. Reports does not have regular BMs.

## 2024-01-26 ENCOUNTER — OUTPATIENT (OUTPATIENT)
Dept: OUTPATIENT SERVICES | Facility: HOSPITAL | Age: 54
LOS: 1 days | Discharge: HOME | End: 2024-01-26

## 2024-01-26 DIAGNOSIS — Z98.891 HISTORY OF UTERINE SCAR FROM PREVIOUS SURGERY: Chronic | ICD-10-CM

## 2024-01-26 DIAGNOSIS — D64.9 ANEMIA, UNSPECIFIED: ICD-10-CM

## 2024-01-26 DIAGNOSIS — Z90.13 ACQUIRED ABSENCE OF BILATERAL BREASTS AND NIPPLES: Chronic | ICD-10-CM

## 2024-01-31 ENCOUNTER — RESULT REVIEW (OUTPATIENT)
Age: 54
End: 2024-01-31

## 2024-01-31 ENCOUNTER — APPOINTMENT (OUTPATIENT)
Dept: HEMATOLOGY ONCOLOGY | Facility: CLINIC | Age: 54
End: 2024-01-31
Payer: COMMERCIAL

## 2024-01-31 ENCOUNTER — NON-APPOINTMENT (OUTPATIENT)
Age: 54
End: 2024-01-31

## 2024-01-31 VITALS
RESPIRATION RATE: 17 BRPM | HEART RATE: 97 BPM | TEMPERATURE: 98.3 F | HEIGHT: 68 IN | BODY MASS INDEX: 20.69 KG/M2 | OXYGEN SATURATION: 99 % | DIASTOLIC BLOOD PRESSURE: 68 MMHG | WEIGHT: 136.5 LBS | SYSTOLIC BLOOD PRESSURE: 105 MMHG

## 2024-01-31 DIAGNOSIS — Z85.3 PERSONAL HISTORY OF MALIGNANT NEOPLASM OF BREAST: ICD-10-CM

## 2024-01-31 LAB
BASOPHILS # BLD AUTO: 0.03 K/UL — SIGNIFICANT CHANGE UP (ref 0–0.2)
BASOPHILS NFR BLD AUTO: 0.7 % — SIGNIFICANT CHANGE UP (ref 0–2)
EOSINOPHIL # BLD AUTO: 0.1 K/UL — SIGNIFICANT CHANGE UP (ref 0–0.5)
EOSINOPHIL NFR BLD AUTO: 2.2 % — SIGNIFICANT CHANGE UP (ref 0–6)
HCT VFR BLD CALC: 24.1 % — LOW (ref 34.5–45)
HGB BLD-MCNC: 8.1 G/DL — LOW (ref 11.5–15.5)
IMM GRANULOCYTES NFR BLD AUTO: 0 % — SIGNIFICANT CHANGE UP (ref 0–0.9)
LYMPHOCYTES # BLD AUTO: 0.63 K/UL — LOW (ref 1–3.3)
LYMPHOCYTES # BLD AUTO: 14.1 % — SIGNIFICANT CHANGE UP (ref 13–44)
MCHC RBC-ENTMCNC: 30.6 PG — SIGNIFICANT CHANGE UP (ref 27–34)
MCHC RBC-ENTMCNC: 33.6 GM/DL — SIGNIFICANT CHANGE UP (ref 32–36)
MCV RBC AUTO: 90.9 FL — SIGNIFICANT CHANGE UP (ref 80–100)
MONOCYTES # BLD AUTO: 0.62 K/UL — SIGNIFICANT CHANGE UP (ref 0–0.9)
MONOCYTES NFR BLD AUTO: 13.9 % — SIGNIFICANT CHANGE UP (ref 2–14)
NEUTROPHILS # BLD AUTO: 3.08 K/UL — SIGNIFICANT CHANGE UP (ref 1.8–7.4)
NEUTROPHILS NFR BLD AUTO: 69.1 % — SIGNIFICANT CHANGE UP (ref 43–77)
NRBC # BLD: 0 /100 WBCS — SIGNIFICANT CHANGE UP (ref 0–0)
PLATELET # BLD AUTO: 82 K/UL — LOW (ref 150–400)
RBC # BLD: 2.65 M/UL — LOW (ref 3.8–5.2)
RBC # FLD: 19.4 % — HIGH (ref 10.3–14.5)
WBC # BLD: 4.46 K/UL — SIGNIFICANT CHANGE UP (ref 3.8–10.5)
WBC # FLD AUTO: 4.46 K/UL — SIGNIFICANT CHANGE UP (ref 3.8–10.5)

## 2024-01-31 PROCEDURE — 99214 OFFICE O/P EST MOD 30 MIN: CPT

## 2024-01-31 NOTE — ASSESSMENT
[FreeTextEntry1] : 51 y/o female with alcoholic liver cirrhosis ( no longer drinking - in AA) pancytopenia/ anemia , sternal mass and hx of left breast cancer in 2018 s/p b/l mastectomies T2 N0 ER / MA positive HER2 negative ( did not have adjuvant therapy)  Jan 2023: biopsy proven recurrence in bone- sternal mass and L5 met. No other bone lesions on PET, no visceral disease.  Letrozole Jan 2023 - did not tolerate. On exemestane and tolerating well  RT to sites of oligomets (  L5, sternum )    completed 3/2/23  Discussed CDK4/6 inhibitor previously and again today . Would need reduced dose due to liver dysfunction. Patient did not follow up for almost a year- CDK4/6 not started. Clinically she is doing OK. Will get PET scan to assess response / current status of disease.  Anemia due to liver disease . Stable- Will check irons tudies. Thrombocytopenia due to liver disease stable. Follows with GI for management of ascites.  return visit in one months- after PET

## 2024-01-31 NOTE — PHYSICAL EXAM
[Restricted in physically strenuous activity but ambulatory and able to carry out work of a light or sedentary nature] : Status 1- Restricted in physically strenuous activity but ambulatory and able to carry out work of a light or sedentary nature, e.g., light house work, office work [Normal] : affect appropriate [de-identified] : looks well  [de-identified] : s/p bilat mastectomies with implants , no axillary adenopathy [de-identified] : not distended   [de-identified] : anterior sternal mass non tender  [de-identified] : no rashes

## 2024-01-31 NOTE — ASSESSMENT
[FreeTextEntry1] : 51 y/o female with alcoholic liver cirrhosis ( no longer drinking - in AA) pancytopenia/ anemia , sternal mass and hx of left breast cancer in 2018 s/p b/l mastectomies T2 N0 ER / MI positive HER2 negative ( did not have adjuvant therapy)    Jan 2023: biopsy proven recurrence in bone- sternal mass and L5 met. No other bone lesions on PET, no visceral disease.  Letrozole Jan 2023 - did not tolerate.  RT to sites of oligomets (  L5, sternum )    completed 3/2/23  march 2023 started  exemestane in 1-2 weeks ( after radiation )   Will need dental clearance for denosumab.   Discussed CDK4/6 inhibitor. Due to liver dysfunction- will need to reduce to daily. Hold off for now- she ahs to be able to tolerate hormonal therapy first before trying any additional meds    Exam in 6 weeks      Pancytopenia- at least partially due to liver disease/ cirrhosis. Anemia of chronic disease in setting of malignancy.  No iron deficiency. Mild degree of hemolysis - common in liver disease.  Monitor.

## 2024-01-31 NOTE — HISTORY OF PRESENT ILLNESS
[de-identified] : 54 y/o female with metastatic breast cancer.  Hx of breast Ca in 2018 s/p mastectomy (Dr. Parson, no chemo/RT), hx of Cocaine use (quit in 1999), former smoker (quit in 2015), alcohol abuse disorder (no history of seizures/delirium tremens.), cirrhosis with esophageal varices. Diagnosed with metastatic breast cancer in 2022.      11/28/22 - Presented to  ER with SOB, cough , abdominal distention, and 16 lbs of weight loss.  WBC: 9.62,  Hgb: 8.6,  Hct: 24.8,  MCV: 111.2,  Plts: 100. PT: 20.1, PTT: 37.5, INR: 1.72 CT A/P - Advanced cirrhosis with extensive esophageal and paraesophageal varices. Enlarged spleen measuring 15.6cm.  Moderate volume ascites.  Diffuse lytic change and areas of cortical destruction involving the sternum suspicious for metastatic disease. 11/29/22 - Diagnostic paracentesis - consistent with portal HTN and no evidence of SBP.  Cytology was negative for malignant cells.  EGD -  trace esophageal varices, portal gastropathy, gastric polyps - H. Pylori positive. Colonoscopy showed internal hemorrhoids. On  Lasix 40mg PO and spironolactone 100mg PO for management of ascites.    HAs  Hx of breast cancer in 2018 left breast ( Dr Parson) Opted for bilat mastectomies.  Invasive ductal 2.2 cm, N0  ( 0/15)    GA 20-30 HEr2 neg Ki 67 > 25  Did not have adjuvant chemo, hormonal tx , no RT ( ? did not follow up).  Biopsy of sternal mass 12/27/22 metastatic adenocarcinoma c/w breast primary  ER positive strong, GA negative  HER2 1-2 +  FISH negative.  PET 1/17/23  destructive avid sternal mass. L5 with focal avidity and lucent changes- suspect met. Resolution of L pleural effusion, marked improvement in ascites . No abnormal FDG uptake in abd pelvis    Started Letrozole in Jan 2023 but did not tolerate ( severe disabling arthralgias) .  s/p  RT to sites of oligometastatic disease ( sternum and L 5) ( Dr Ferrera ).  Completed  3/2/23   March 2023  started exemestane.    [de-identified] : metastatic biopsy ER  positive   MT negative HER2 1-2 +  FISH negative  [de-identified] : bone mets

## 2024-01-31 NOTE — HISTORY OF PRESENT ILLNESS
[de-identified] : 52 y/o female with metastatic breast cancer.  Hx of breast Ca in 2018 s/p mastectomy (Dr. Parson, no chemo/RT), hx of Cocaine use (quit in 1999), former smoker (quit in 2015), alcohol abuse disorder (no history of seizures/delirium tremens.), cirrhosis with esophageal varices. Diagnosed with metastatic breast cancer in 2022.      11/28/22 - Presented to  ER with SOB, cough , abdominal distention, and 16 lbs of weight loss.  WBC: 9.62,  Hgb: 8.6,  Hct: 24.8,  MCV: 111.2,  Plts: 100. PT: 20.1, PTT: 37.5, INR: 1.72 CT A/P - Advanced cirrhosis with extensive esophageal and paraesophageal varices. Enlarged spleen measuring 15.6cm.  Moderate volume ascites.  Diffuse lytic change and areas of cortical destruction involving the sternum suspicious for metastatic disease. 11/29/22 - Diagnostic paracentesis - consistent with portal HTN and no evidence of SBP.  Cytology was negative for malignant cells.  EGD -  trace esophageal varices, portal gastropathy, gastric polyps - H. Pylori positive. Colonoscopy showed internal hemorrhoids. On  Lasix 40mg PO and spironolactone 100mg PO for management of ascites.    HAs  Hx of breast cancer in 2018 left breast ( Dr Parson) Opted for bilat mastectomies.  Invasive ductal 2.2 cm, N0  ( 0/15)    AK 20-30 HEr2 neg Ki 67 > 25  Did not have adjuvant chemo, hormonal tx , no RT ( ? did not follow up).  Biopsy of sternal mass 12/27/22 metastatic adenocarcinoma c/w breast primary  ER positive strong, AK negative  HER2 1-2 +  FISH negative.  PET 1/17/23  destructive avid sternal mass. L5 with focal avidity and lucent changes- suspect met. Resolution of L pleural effusion, marked improvement in ascites . No abnormal FDG uptake in abd pelvis   Started Letrozole in Jan 2023 but did not tolerate ( severe disabling arthralgias) . Changed to exemestane in Aor[place] 2023.  s/p  RT to sites of oligometastatic disease ( sternum and L 5) ( Dr Ferrera ).  Completed  3/2/23    [de-identified] : metastatic biopsy ER  positive   MA negative HER2 1-2 +  FISH negative  [de-identified] : bone mets  [de-identified] : Has not been here  since March. On exemestane and tolerating OK. Overall doing well Low back discomfort better. Sternum met does not bother her.  Trying to maintain good nutrition. Continues to work Alcohol sporadically.

## 2024-01-31 NOTE — REVIEW OF SYSTEMS
[Patient Intake Form Reviewed] : Patient intake form was reviewed [Joint Pain] : joint pain [Joint Stiffness] : joint stiffness [Negative] : Endocrine [Fatigue] : no fatigue [Recent Change In Weight] : ~T no recent weight change

## 2024-02-01 ENCOUNTER — NON-APPOINTMENT (OUTPATIENT)
Age: 54
End: 2024-02-01

## 2024-02-01 DIAGNOSIS — D69.6 THROMBOCYTOPENIA, UNSPECIFIED: ICD-10-CM

## 2024-02-01 DIAGNOSIS — C79.51 SECONDARY MALIGNANT NEOPLASM OF BONE: ICD-10-CM

## 2024-02-01 DIAGNOSIS — E87.6 HYPOKALEMIA: ICD-10-CM

## 2024-02-01 DIAGNOSIS — K70.31 ALCOHOLIC CIRRHOSIS OF LIVER WITH ASCITES: ICD-10-CM

## 2024-02-01 DIAGNOSIS — C50.919 MALIGNANT NEOPLASM OF UNSPECIFIED SITE OF UNSPECIFIED FEMALE BREAST: ICD-10-CM

## 2024-02-01 DIAGNOSIS — D53.9 NUTRITIONAL ANEMIA, UNSPECIFIED: ICD-10-CM

## 2024-02-01 LAB
ALBUMIN SERPL ELPH-MCNC: 4.2 G/DL — SIGNIFICANT CHANGE UP (ref 3.3–5)
ALP SERPL-CCNC: 89 U/L — SIGNIFICANT CHANGE UP (ref 40–120)
ALT FLD-CCNC: 21 U/L — SIGNIFICANT CHANGE UP (ref 10–45)
ANION GAP SERPL CALC-SCNC: 18 MMOL/L — HIGH (ref 5–17)
AST SERPL-CCNC: 89 U/L — HIGH (ref 10–40)
BILIRUB SERPL-MCNC: 3.3 MG/DL — HIGH (ref 0.2–1.2)
BUN SERPL-MCNC: 21 MG/DL — SIGNIFICANT CHANGE UP (ref 7–23)
CALCIUM SERPL-MCNC: 8.5 MG/DL — SIGNIFICANT CHANGE UP (ref 8.4–10.5)
CANCER AG27-29 SERPL-ACNC: 38 U/ML — SIGNIFICANT CHANGE UP (ref 0–38.6)
CHLORIDE SERPL-SCNC: 85 MMOL/L — LOW (ref 96–108)
CO2 SERPL-SCNC: 28 MMOL/L — SIGNIFICANT CHANGE UP (ref 22–31)
CREAT SERPL-MCNC: 0.77 MG/DL — SIGNIFICANT CHANGE UP (ref 0.5–1.3)
EGFR: 93 ML/MIN/1.73M2 — SIGNIFICANT CHANGE UP
FERRITIN SERPL-MCNC: 78 NG/ML — SIGNIFICANT CHANGE UP (ref 13–330)
FOLATE SERPL-MCNC: >20 NG/ML — SIGNIFICANT CHANGE UP
GLUCOSE SERPL-MCNC: 112 MG/DL — HIGH (ref 70–99)
IRON SATN MFR SERPL: 32 UG/DL — SIGNIFICANT CHANGE UP (ref 30–160)
IRON SATN MFR SERPL: 7 % — LOW (ref 14–50)
POTASSIUM SERPL-MCNC: 2.8 MMOL/L — CRITICAL LOW (ref 3.5–5.3)
POTASSIUM SERPL-SCNC: 2.8 MMOL/L — CRITICAL LOW (ref 3.5–5.3)
PROT SERPL-MCNC: 8 G/DL — SIGNIFICANT CHANGE UP (ref 6–8.3)
SODIUM SERPL-SCNC: 131 MMOL/L — LOW (ref 135–145)
TIBC SERPL-MCNC: 469 UG/DL — HIGH (ref 220–430)
UIBC SERPL-MCNC: 438 UG/DL — HIGH (ref 110–370)
VIT B12 SERPL-MCNC: 1638 PG/ML — HIGH (ref 232–1245)

## 2024-02-12 ENCOUNTER — NON-APPOINTMENT (OUTPATIENT)
Age: 54
End: 2024-02-12

## 2024-02-16 ENCOUNTER — APPOINTMENT (OUTPATIENT)
Dept: NUCLEAR MEDICINE | Facility: CLINIC | Age: 54
End: 2024-02-16
Payer: COMMERCIAL

## 2024-02-16 ENCOUNTER — OUTPATIENT (OUTPATIENT)
Dept: OUTPATIENT SERVICES | Facility: HOSPITAL | Age: 54
LOS: 1 days | End: 2024-02-16

## 2024-02-16 DIAGNOSIS — Z98.891 HISTORY OF UTERINE SCAR FROM PREVIOUS SURGERY: Chronic | ICD-10-CM

## 2024-02-16 DIAGNOSIS — Z90.13 ACQUIRED ABSENCE OF BILATERAL BREASTS AND NIPPLES: Chronic | ICD-10-CM

## 2024-02-16 DIAGNOSIS — C50.919 MALIGNANT NEOPLASM OF UNSPECIFIED SITE OF UNSPECIFIED FEMALE BREAST: ICD-10-CM

## 2024-02-16 PROCEDURE — 78815 PET IMAGE W/CT SKULL-THIGH: CPT | Mod: 26,PS

## 2024-02-28 ENCOUNTER — APPOINTMENT (OUTPATIENT)
Dept: HEMATOLOGY ONCOLOGY | Facility: CLINIC | Age: 54
End: 2024-02-28
Payer: COMMERCIAL

## 2024-02-28 VITALS
TEMPERATURE: 97.7 F | SYSTOLIC BLOOD PRESSURE: 113 MMHG | DIASTOLIC BLOOD PRESSURE: 71 MMHG | HEIGHT: 68 IN | BODY MASS INDEX: 20.61 KG/M2 | OXYGEN SATURATION: 98 % | WEIGHT: 136 LBS | HEART RATE: 100 BPM

## 2024-02-28 DIAGNOSIS — D53.9 NUTRITIONAL ANEMIA, UNSPECIFIED: ICD-10-CM

## 2024-02-28 PROCEDURE — 99215 OFFICE O/P EST HI 40 MIN: CPT

## 2024-02-28 NOTE — PHYSICAL EXAM
[Restricted in physically strenuous activity but ambulatory and able to carry out work of a light or sedentary nature] : Status 1- Restricted in physically strenuous activity but ambulatory and able to carry out work of a light or sedentary nature, e.g., light house work, office work [Normal] : affect appropriate [de-identified] : s/p bilat mastectomies with implants , no axillary adenopathy [de-identified] : looks well  [de-identified] : not distended   [de-identified] : anterior sternal mass non tender  [de-identified] : no rashes

## 2024-02-28 NOTE — REVIEW OF SYSTEMS
[Patient Intake Form Reviewed] : Patient intake form was reviewed [Fatigue] : fatigue [Diarrhea: Grade 0] : Diarrhea: Grade 0 [Negative] : Allergic/Immunologic

## 2024-02-28 NOTE — ASSESSMENT
[FreeTextEntry1] : 54 y/o female with alcoholic liver cirrhosis ( no longer drinking - in AA) anemia/ thrombocytopenia ,  hx of left breast cancer in 2018 s/p b/l mastectomies T2 N0 ER / MN positive HER2 negative ( did not have adjuvant therapy)  Jan 2023: biopsy proven recurrence in bone- sternal mass and L5 met. No other bone lesions on PET, no visceral disease.  Letrozole Jan 2023 - did not tolerate. On exemestane and tolerating well  RT to sites of oligomets ( L5, sternum ) completed 3/2/23 Recent PET in Feb 2024 showed response to therapy Discussed CDK4/6 inhibitor previously and again today. Would need reduced dose due to liver dysfunction. Patient did not follow up for almost a year- CDK4/6 not started but now she is willing to try it and to be compliant with follow up. She is already responding to AI but addition of CDK4/6 inhibitor might help to maintain much longer duration of response/ longer time to progression.  Plan for Ibrance at reduced dose 100 mg daily Discussed Ibrance- benefits/ side efefcts and monitoring  Anemia  and thrombocytopenia due to liver disease. Stable.Follows with GI for management of ascites.  Rajwinder to start Ibrance  Mon 3/11 Labs ( CBC CMP ) on 3/15 Exam 4/3 before cycle 2.

## 2024-02-28 NOTE — HISTORY OF PRESENT ILLNESS
[de-identified] : 54 y/o female with metastatic breast cancer.  Hx of breast Ca in 2018 s/p mastectomy (Dr. Parson, no chemo/RT), hx of Cocaine use (quit in 1999), former smoker (quit in 2015), alcohol abuse disorder (no history of seizures/delirium tremens.), cirrhosis with esophageal varices. Diagnosed with metastatic breast cancer in 2022.      11/28/22 - Presented to  ER with SOB, cough , abdominal distention, and 16 lbs of weight loss.  WBC: 9.62,  Hgb: 8.6,  Hct: 24.8,  MCV: 111.2,  Plts: 100. PT: 20.1, PTT: 37.5, INR: 1.72 CT A/P - Advanced cirrhosis with extensive esophageal and paraesophageal varices. Enlarged spleen measuring 15.6cm.  Moderate volume ascites.  Diffuse lytic change and areas of cortical destruction involving the sternum suspicious for metastatic disease. 11/29/22 - Diagnostic paracentesis - consistent with portal HTN and no evidence of SBP.  Cytology was negative for malignant cells.  EGD -  trace esophageal varices, portal gastropathy, gastric polyps - H. Pylori positive. Colonoscopy showed internal hemorrhoids. On  Lasix 40mg PO and spironolactone 100mg PO for management of ascites.    HAs  Hx of breast cancer in 2018 left breast ( Dr Parson) Opted for bilat mastectomies.  Invasive ductal 2.2 cm, N0  ( 0/15)    AL 20-30 HEr2 neg Ki 67 > 25  Did not have adjuvant chemo, hormonal tx , no RT ( ? did not follow up).  Biopsy of sternal mass 12/27/22 metastatic adenocarcinoma c/w breast primary  ER positive strong, AL negative  HER2 1-2 +  FISH negative.  PET 1/17/23  destructive avid sternal mass. L5 with focal avidity and lucent changes- suspect met. Resolution of L pleural effusion, marked improvement in ascites . No abnormal FDG uptake in abd pelvis   Started Letrozole in Jan 2023 but did not tolerate ( severe disabling arthralgias) . Changed to exemestane in [place] 2023.  s/p  RT to sites of oligometastatic disease ( sternum and L 5) ( Dr Ferrera ).  Completed  3/2/23   Plan was to add CDK4/6 inhibitor but she did not follow up here for almost one year.  PET 2/16/24 Interval response to therapy with interval decrease in FDG avidity of previously seen bone lesions. Mild residual activity, for which attention on follow-up is recommended to evaluate for stability. Mildly FDG avid T9 lytic lesion, not evident on CT 5/21/2023 or prior PET CT 1/17/2023. It is unclear if this is also treated disease or potentially degenerative. Consider interval short-term follow-up to evaluate for stability. 2. Fracture in the posterior elements of L3, along with likely posttraumatic change in a left posterior rib and left posterior elements of L4. FDG uptake in paraspinous muscles. This may be due to recent fall.   [de-identified] : metastatic biopsy ER  positive   NH negative HER2 1-2 +  FISH negative  [de-identified] : bone mets  [de-identified] : Feels well. On exemestane and tolerating OK. Overall doing well Low back discomfort better. Sternum met does not bother her.  Trying to maintain good nutrition. Continues to work Alcohol sporadically.

## 2024-03-04 ENCOUNTER — NON-APPOINTMENT (OUTPATIENT)
Age: 54
End: 2024-03-04

## 2024-03-06 ENCOUNTER — NON-APPOINTMENT (OUTPATIENT)
Age: 54
End: 2024-03-06

## 2024-03-12 PROBLEM — E55.9 VITAMIN D DEFICIENCY: Status: ACTIVE | Noted: 2024-03-12

## 2024-03-12 PROBLEM — Z86.2 HISTORY OF PANCYTOPENIA: Status: RESOLVED | Noted: 2022-12-19 | Resolved: 2024-03-12

## 2024-03-12 RX ORDER — PALBOCICLIB 100 MG/1
100 TABLET, FILM COATED ORAL
Qty: 21 | Refills: 10 | Status: DISCONTINUED | COMMUNITY
Start: 2024-02-28 | End: 2024-03-12

## 2024-03-13 ENCOUNTER — RESULT REVIEW (OUTPATIENT)
Age: 54
End: 2024-03-13

## 2024-03-13 ENCOUNTER — APPOINTMENT (OUTPATIENT)
Dept: HEMATOLOGY ONCOLOGY | Facility: CLINIC | Age: 54
End: 2024-03-13
Payer: COMMERCIAL

## 2024-03-13 VITALS
TEMPERATURE: 98.3 F | HEART RATE: 101 BPM | BODY MASS INDEX: 18.83 KG/M2 | OXYGEN SATURATION: 93 % | HEIGHT: 72 IN | WEIGHT: 139 LBS | SYSTOLIC BLOOD PRESSURE: 109 MMHG | DIASTOLIC BLOOD PRESSURE: 64 MMHG

## 2024-03-13 DIAGNOSIS — F10.21 ALCOHOL DEPENDENCE, IN REMISSION: ICD-10-CM

## 2024-03-13 DIAGNOSIS — C79.51 PERSONAL HISTORY OF MALIGNANT NEOPLASM OF BREAST: ICD-10-CM

## 2024-03-13 DIAGNOSIS — Z86.2 PERSONAL HISTORY OF DISEASES OF THE BLOOD AND BLOOD-FORMING ORGANS AND CERTAIN DISORDERS INVOLVING THE IMMUNE MECHANISM: ICD-10-CM

## 2024-03-13 DIAGNOSIS — E55.9 VITAMIN D DEFICIENCY, UNSPECIFIED: ICD-10-CM

## 2024-03-13 DIAGNOSIS — Z85.3 PERSONAL HISTORY OF MALIGNANT NEOPLASM OF BREAST: ICD-10-CM

## 2024-03-13 LAB
24R-OH-CALCIDIOL SERPL-MCNC: 26 NG/ML — LOW (ref 30–80)
ALBUMIN SERPL ELPH-MCNC: 4.1 G/DL — SIGNIFICANT CHANGE UP (ref 3.3–5)
ALP SERPL-CCNC: 101 U/L — SIGNIFICANT CHANGE UP (ref 40–120)
ALT FLD-CCNC: 19 U/L — SIGNIFICANT CHANGE UP (ref 10–45)
ANION GAP SERPL CALC-SCNC: 15 MMOL/L — SIGNIFICANT CHANGE UP (ref 5–17)
AST SERPL-CCNC: 73 U/L — HIGH (ref 10–40)
BASOPHILS # BLD AUTO: 0.04 K/UL — SIGNIFICANT CHANGE UP (ref 0–0.2)
BASOPHILS NFR BLD AUTO: 1.1 % — SIGNIFICANT CHANGE UP (ref 0–2)
BILIRUB SERPL-MCNC: 1.1 MG/DL — SIGNIFICANT CHANGE UP (ref 0.2–1.2)
BUN SERPL-MCNC: 9 MG/DL — SIGNIFICANT CHANGE UP (ref 7–23)
CALCIUM SERPL-MCNC: 8.6 MG/DL — SIGNIFICANT CHANGE UP (ref 8.4–10.5)
CHLORIDE SERPL-SCNC: 100 MMOL/L — SIGNIFICANT CHANGE UP (ref 96–108)
CO2 SERPL-SCNC: 25 MMOL/L — SIGNIFICANT CHANGE UP (ref 22–31)
CREAT SERPL-MCNC: 0.5 MG/DL — SIGNIFICANT CHANGE UP (ref 0.5–1.3)
EGFR: 113 ML/MIN/1.73M2 — SIGNIFICANT CHANGE UP
EOSINOPHIL # BLD AUTO: 0.11 K/UL — SIGNIFICANT CHANGE UP (ref 0–0.5)
EOSINOPHIL NFR BLD AUTO: 2.9 % — SIGNIFICANT CHANGE UP (ref 0–6)
GLUCOSE SERPL-MCNC: 107 MG/DL — HIGH (ref 70–99)
HCT VFR BLD CALC: 24.5 % — LOW (ref 34.5–45)
HGB BLD-MCNC: 7.9 G/DL — LOW (ref 11.5–15.5)
IMM GRANULOCYTES NFR BLD AUTO: 0.3 % — SIGNIFICANT CHANGE UP (ref 0–0.9)
LYMPHOCYTES # BLD AUTO: 1.14 K/UL — SIGNIFICANT CHANGE UP (ref 1–3.3)
LYMPHOCYTES # BLD AUTO: 30.2 % — SIGNIFICANT CHANGE UP (ref 13–44)
MAGNESIUM SERPL-MCNC: 1.6 MG/DL — SIGNIFICANT CHANGE UP (ref 1.6–2.6)
MCHC RBC-ENTMCNC: 28.9 PG — SIGNIFICANT CHANGE UP (ref 27–34)
MCHC RBC-ENTMCNC: 32.2 GM/DL — SIGNIFICANT CHANGE UP (ref 32–36)
MCV RBC AUTO: 89.7 FL — SIGNIFICANT CHANGE UP (ref 80–100)
MONOCYTES # BLD AUTO: 0.35 K/UL — SIGNIFICANT CHANGE UP (ref 0–0.9)
MONOCYTES NFR BLD AUTO: 9.3 % — SIGNIFICANT CHANGE UP (ref 2–14)
NEUTROPHILS # BLD AUTO: 2.13 K/UL — SIGNIFICANT CHANGE UP (ref 1.8–7.4)
NEUTROPHILS NFR BLD AUTO: 56.2 % — SIGNIFICANT CHANGE UP (ref 43–77)
NRBC # BLD: 0 /100 WBCS — SIGNIFICANT CHANGE UP (ref 0–0)
PLATELET # BLD AUTO: 65 K/UL — LOW (ref 150–400)
POTASSIUM SERPL-MCNC: 3.4 MMOL/L — LOW (ref 3.5–5.3)
POTASSIUM SERPL-SCNC: 3.4 MMOL/L — LOW (ref 3.5–5.3)
PROT SERPL-MCNC: 8.1 G/DL — SIGNIFICANT CHANGE UP (ref 6–8.3)
RBC # BLD: 2.73 M/UL — LOW (ref 3.8–5.2)
RBC # FLD: 20.8 % — HIGH (ref 10.3–14.5)
SODIUM SERPL-SCNC: 140 MMOL/L — SIGNIFICANT CHANGE UP (ref 135–145)
WBC # BLD: 3.78 K/UL — LOW (ref 3.8–10.5)
WBC # FLD AUTO: 3.78 K/UL — LOW (ref 3.8–10.5)

## 2024-03-13 PROCEDURE — 99215 OFFICE O/P EST HI 40 MIN: CPT

## 2024-03-13 PROCEDURE — G2212 PROLONG OUTPT/OFFICE VIS: CPT

## 2024-03-13 RX ORDER — ESCITALOPRAM OXALATE 5 MG/1
TABLET, FILM COATED ORAL
Refills: 0 | Status: DISCONTINUED | COMMUNITY
End: 2024-03-13

## 2024-03-13 RX ORDER — SPIRONOLACTONE 50 MG/1
TABLET ORAL
Refills: 0 | Status: DISCONTINUED | COMMUNITY
End: 2024-03-13

## 2024-03-13 RX ORDER — FUROSEMIDE 80 MG/1
TABLET ORAL
Refills: 0 | Status: DISCONTINUED | COMMUNITY
End: 2024-03-13

## 2024-03-14 ENCOUNTER — NON-APPOINTMENT (OUTPATIENT)
Age: 54
End: 2024-03-14

## 2024-03-14 DIAGNOSIS — Z79.899 OTHER LONG TERM (CURRENT) DRUG THERAPY: ICD-10-CM

## 2024-03-15 RX ORDER — POTASSIUM CHLORIDE 1500 MG/1
20 TABLET, FILM COATED, EXTENDED RELEASE ORAL
Qty: 1 | Refills: 0 | Status: ACTIVE | COMMUNITY
Start: 2024-03-14 | End: 1900-01-01

## 2024-03-20 ENCOUNTER — NON-APPOINTMENT (OUTPATIENT)
Age: 54
End: 2024-03-20

## 2024-03-25 ENCOUNTER — APPOINTMENT (OUTPATIENT)
Dept: HEMATOLOGY ONCOLOGY | Facility: CLINIC | Age: 54
End: 2024-03-25

## 2024-04-02 ENCOUNTER — OUTPATIENT (OUTPATIENT)
Dept: OUTPATIENT SERVICES | Facility: HOSPITAL | Age: 54
LOS: 1 days | Discharge: HOME | End: 2024-04-02

## 2024-04-02 DIAGNOSIS — Z90.13 ACQUIRED ABSENCE OF BILATERAL BREASTS AND NIPPLES: Chronic | ICD-10-CM

## 2024-04-02 DIAGNOSIS — Z98.891 HISTORY OF UTERINE SCAR FROM PREVIOUS SURGERY: Chronic | ICD-10-CM

## 2024-04-02 DIAGNOSIS — D64.9 ANEMIA, UNSPECIFIED: ICD-10-CM

## 2024-04-03 ENCOUNTER — RESULT REVIEW (OUTPATIENT)
Age: 54
End: 2024-04-03

## 2024-04-03 ENCOUNTER — APPOINTMENT (OUTPATIENT)
Dept: HEMATOLOGY ONCOLOGY | Facility: CLINIC | Age: 54
End: 2024-04-03
Payer: COMMERCIAL

## 2024-04-03 VITALS
DIASTOLIC BLOOD PRESSURE: 68 MMHG | HEIGHT: 68 IN | TEMPERATURE: 98.3 F | WEIGHT: 137.38 LBS | SYSTOLIC BLOOD PRESSURE: 106 MMHG | BODY MASS INDEX: 20.82 KG/M2

## 2024-04-03 LAB
ANISOCYTOSIS BLD QL: SIGNIFICANT CHANGE UP
BASOPHILS # BLD AUTO: 0.03 K/UL — SIGNIFICANT CHANGE UP (ref 0–0.2)
BASOPHILS NFR BLD AUTO: 2 % — SIGNIFICANT CHANGE UP (ref 0–2)
EOSINOPHIL # BLD AUTO: 0.05 K/UL — SIGNIFICANT CHANGE UP (ref 0–0.5)
EOSINOPHIL NFR BLD AUTO: 3 % — SIGNIFICANT CHANGE UP (ref 0–6)
HCT VFR BLD CALC: 19.5 % — CRITICAL LOW (ref 34.5–45)
HGB BLD-MCNC: 6.4 G/DL — CRITICAL LOW (ref 11.5–15.5)
HYPOCHROMIA BLD QL: SIGNIFICANT CHANGE UP
LYMPHOCYTES # BLD AUTO: 0.39 K/UL — LOW (ref 1–3.3)
LYMPHOCYTES # BLD AUTO: 25 % — SIGNIFICANT CHANGE UP (ref 13–44)
MACROCYTES BLD QL: SLIGHT — SIGNIFICANT CHANGE UP
MCHC RBC-ENTMCNC: 30.5 PG — SIGNIFICANT CHANGE UP (ref 27–34)
MCHC RBC-ENTMCNC: 32.8 GM/DL — SIGNIFICANT CHANGE UP (ref 32–36)
MCV RBC AUTO: 92.9 FL — SIGNIFICANT CHANGE UP (ref 80–100)
MICROCYTES BLD QL: SLIGHT — SIGNIFICANT CHANGE UP
MONOCYTES # BLD AUTO: 0.11 K/UL — SIGNIFICANT CHANGE UP (ref 0–0.9)
MONOCYTES NFR BLD AUTO: 7 % — SIGNIFICANT CHANGE UP (ref 2–14)
NEUTROPHILS # BLD AUTO: 0.98 K/UL — LOW (ref 1.8–7.4)
NEUTROPHILS NFR BLD AUTO: 63 % — SIGNIFICANT CHANGE UP (ref 43–77)
NRBC # BLD: 0 /100 WBCS — SIGNIFICANT CHANGE UP (ref 0–0)
NRBC # BLD: SIGNIFICANT CHANGE UP /100 WBCS (ref 0–0)
PLAT MORPH BLD: NORMAL — SIGNIFICANT CHANGE UP
PLATELET # BLD AUTO: 23 K/UL — LOW (ref 150–400)
RBC # BLD: 2.1 M/UL — LOW (ref 3.8–5.2)
RBC # FLD: 21.1 % — HIGH (ref 10.3–14.5)
RBC BLD AUTO: ABNORMAL
ROULEAUX BLD QL SMEAR: PRESENT
STOMATOCYTES BLD QL SMEAR: SLIGHT — SIGNIFICANT CHANGE UP
WBC # BLD: 1.56 K/UL — LOW (ref 3.8–10.5)
WBC # FLD AUTO: 1.56 K/UL — LOW (ref 3.8–10.5)

## 2024-04-03 PROCEDURE — 99214 OFFICE O/P EST MOD 30 MIN: CPT

## 2024-04-03 PROCEDURE — G2211 COMPLEX E/M VISIT ADD ON: CPT | Mod: NC,1L

## 2024-04-04 DIAGNOSIS — C50.919 MALIGNANT NEOPLASM OF UNSPECIFIED SITE OF UNSPECIFIED FEMALE BREAST: ICD-10-CM

## 2024-04-04 DIAGNOSIS — C79.51 SECONDARY MALIGNANT NEOPLASM OF BONE: ICD-10-CM

## 2024-04-04 DIAGNOSIS — Z79.811 LONG TERM (CURRENT) USE OF AROMATASE INHIBITORS: ICD-10-CM

## 2024-04-04 DIAGNOSIS — D69.6 THROMBOCYTOPENIA, UNSPECIFIED: ICD-10-CM

## 2024-04-04 DIAGNOSIS — Z79.899 OTHER LONG TERM (CURRENT) DRUG THERAPY: ICD-10-CM

## 2024-04-04 LAB
ALBUMIN SERPL ELPH-MCNC: 4.2 G/DL — SIGNIFICANT CHANGE UP (ref 3.3–5)
ALP SERPL-CCNC: 86 U/L — SIGNIFICANT CHANGE UP (ref 40–120)
ALT FLD-CCNC: 17 U/L — SIGNIFICANT CHANGE UP (ref 10–45)
ANION GAP SERPL CALC-SCNC: 15 MMOL/L — SIGNIFICANT CHANGE UP (ref 5–17)
AST SERPL-CCNC: 59 U/L — HIGH (ref 10–40)
BILIRUB SERPL-MCNC: 2.5 MG/DL — HIGH (ref 0.2–1.2)
BUN SERPL-MCNC: 18 MG/DL — SIGNIFICANT CHANGE UP (ref 7–23)
CALCIUM SERPL-MCNC: 9.1 MG/DL — SIGNIFICANT CHANGE UP (ref 8.4–10.5)
CHLORIDE SERPL-SCNC: 94 MMOL/L — LOW (ref 96–108)
CO2 SERPL-SCNC: 21 MMOL/L — LOW (ref 22–31)
CREAT SERPL-MCNC: 0.89 MG/DL — SIGNIFICANT CHANGE UP (ref 0.5–1.3)
EGFR: 78 ML/MIN/1.73M2 — SIGNIFICANT CHANGE UP
GLUCOSE SERPL-MCNC: 132 MG/DL — HIGH (ref 70–99)
POTASSIUM SERPL-MCNC: 3.9 MMOL/L — SIGNIFICANT CHANGE UP (ref 3.5–5.3)
POTASSIUM SERPL-SCNC: 3.9 MMOL/L — SIGNIFICANT CHANGE UP (ref 3.5–5.3)
PROT SERPL-MCNC: 7.8 G/DL — SIGNIFICANT CHANGE UP (ref 6–8.3)
SODIUM SERPL-SCNC: 131 MMOL/L — LOW (ref 135–145)

## 2024-04-08 ENCOUNTER — NON-APPOINTMENT (OUTPATIENT)
Age: 54
End: 2024-04-08

## 2024-04-09 ENCOUNTER — NON-APPOINTMENT (OUTPATIENT)
Age: 54
End: 2024-04-09

## 2024-04-10 ENCOUNTER — APPOINTMENT (OUTPATIENT)
Dept: HEMATOLOGY ONCOLOGY | Facility: CLINIC | Age: 54
End: 2024-04-10

## 2024-04-10 ENCOUNTER — RESULT REVIEW (OUTPATIENT)
Age: 54
End: 2024-04-10

## 2024-04-10 ENCOUNTER — NON-APPOINTMENT (OUTPATIENT)
Age: 54
End: 2024-04-10

## 2024-04-10 LAB
ANISOCYTOSIS BLD QL: SIGNIFICANT CHANGE UP
BASOPHILS # BLD AUTO: 0.06 K/UL — SIGNIFICANT CHANGE UP (ref 0–0.2)
BASOPHILS NFR BLD AUTO: 2 % — SIGNIFICANT CHANGE UP (ref 0–2)
EOSINOPHIL # BLD AUTO: 0.04 K/UL — SIGNIFICANT CHANGE UP (ref 0–0.5)
EOSINOPHIL NFR BLD AUTO: 1.3 % — SIGNIFICANT CHANGE UP (ref 0–6)
HCT VFR BLD CALC: 20.2 % — CRITICAL LOW (ref 34.5–45)
HGB BLD-MCNC: 6.8 G/DL — CRITICAL LOW (ref 11.5–15.5)
HYPOCHROMIA BLD QL: SIGNIFICANT CHANGE UP
IMM GRANULOCYTES NFR BLD AUTO: 0.3 % — SIGNIFICANT CHANGE UP (ref 0–0.9)
LG PLATELETS BLD QL AUTO: SLIGHT — SIGNIFICANT CHANGE UP
LYMPHOCYTES # BLD AUTO: 1.11 K/UL — SIGNIFICANT CHANGE UP (ref 1–3.3)
LYMPHOCYTES # BLD AUTO: 37.2 % — SIGNIFICANT CHANGE UP (ref 13–44)
MACROCYTES BLD QL: SLIGHT — SIGNIFICANT CHANGE UP
MCHC RBC-ENTMCNC: 30.9 PG — SIGNIFICANT CHANGE UP (ref 27–34)
MCHC RBC-ENTMCNC: 33.7 GM/DL — SIGNIFICANT CHANGE UP (ref 32–36)
MCV RBC AUTO: 91.8 FL — SIGNIFICANT CHANGE UP (ref 80–100)
MICROCYTES BLD QL: SIGNIFICANT CHANGE UP
MONOCYTES # BLD AUTO: 0.43 K/UL — SIGNIFICANT CHANGE UP (ref 0–0.9)
MONOCYTES NFR BLD AUTO: 14.4 % — HIGH (ref 2–14)
NEUTROPHILS # BLD AUTO: 1.33 K/UL — LOW (ref 1.8–7.4)
NEUTROPHILS NFR BLD AUTO: 44.8 % — SIGNIFICANT CHANGE UP (ref 43–77)
NRBC # BLD: 0 /100 WBCS — SIGNIFICANT CHANGE UP (ref 0–0)
PLAT MORPH BLD: NORMAL — SIGNIFICANT CHANGE UP
PLATELET # BLD AUTO: 76 K/UL — LOW (ref 150–400)
RBC # BLD: 2.2 M/UL — LOW (ref 3.8–5.2)
RBC # FLD: 22.9 % — HIGH (ref 10.3–14.5)
RBC BLD AUTO: ABNORMAL
SCHISTOCYTES BLD QL AUTO: SLIGHT — SIGNIFICANT CHANGE UP
WBC # BLD: 2.98 K/UL — LOW (ref 3.8–10.5)
WBC # FLD AUTO: 2.98 K/UL — LOW (ref 3.8–10.5)

## 2024-04-11 ENCOUNTER — NON-APPOINTMENT (OUTPATIENT)
Age: 54
End: 2024-04-11

## 2024-04-17 ENCOUNTER — APPOINTMENT (OUTPATIENT)
Dept: HEMATOLOGY ONCOLOGY | Facility: CLINIC | Age: 54
End: 2024-04-17

## 2024-04-22 ENCOUNTER — APPOINTMENT (OUTPATIENT)
Dept: HEMATOLOGY ONCOLOGY | Facility: CLINIC | Age: 54
End: 2024-04-22

## 2024-05-06 ENCOUNTER — RESULT REVIEW (OUTPATIENT)
Age: 54
End: 2024-05-06

## 2024-05-06 ENCOUNTER — APPOINTMENT (OUTPATIENT)
Dept: HEMATOLOGY ONCOLOGY | Facility: CLINIC | Age: 54
End: 2024-05-06

## 2024-05-06 LAB
ALBUMIN SERPL ELPH-MCNC: 4.2 G/DL — SIGNIFICANT CHANGE UP (ref 3.3–5)
ALP SERPL-CCNC: 104 U/L — SIGNIFICANT CHANGE UP (ref 40–120)
ALT FLD-CCNC: 14 U/L — SIGNIFICANT CHANGE UP (ref 10–45)
ANION GAP SERPL CALC-SCNC: 16 MMOL/L — SIGNIFICANT CHANGE UP (ref 5–17)
AST SERPL-CCNC: 77 U/L — HIGH (ref 10–40)
BASOPHILS # BLD AUTO: 0.07 K/UL — SIGNIFICANT CHANGE UP (ref 0–0.2)
BASOPHILS NFR BLD AUTO: 1.2 % — SIGNIFICANT CHANGE UP (ref 0–2)
BILIRUB SERPL-MCNC: 2.7 MG/DL — HIGH (ref 0.2–1.2)
BUN SERPL-MCNC: 15 MG/DL — SIGNIFICANT CHANGE UP (ref 7–23)
CALCIUM SERPL-MCNC: 8.6 MG/DL — SIGNIFICANT CHANGE UP (ref 8.4–10.5)
CHLORIDE SERPL-SCNC: 91 MMOL/L — LOW (ref 96–108)
CO2 SERPL-SCNC: 26 MMOL/L — SIGNIFICANT CHANGE UP (ref 22–31)
CREAT SERPL-MCNC: 0.65 MG/DL — SIGNIFICANT CHANGE UP (ref 0.5–1.3)
EGFR: 107 ML/MIN/1.73M2 — SIGNIFICANT CHANGE UP
EOSINOPHIL # BLD AUTO: 0.1 K/UL — SIGNIFICANT CHANGE UP (ref 0–0.5)
EOSINOPHIL NFR BLD AUTO: 1.8 % — SIGNIFICANT CHANGE UP (ref 0–6)
GLUCOSE SERPL-MCNC: 118 MG/DL — HIGH (ref 70–99)
HCT VFR BLD CALC: 23 % — LOW (ref 34.5–45)
HGB BLD-MCNC: 7.5 G/DL — LOW (ref 11.5–15.5)
IMM GRANULOCYTES NFR BLD AUTO: 0.2 % — SIGNIFICANT CHANGE UP (ref 0–0.9)
LYMPHOCYTES # BLD AUTO: 1.16 K/UL — SIGNIFICANT CHANGE UP (ref 1–3.3)
LYMPHOCYTES # BLD AUTO: 20.5 % — SIGNIFICANT CHANGE UP (ref 13–44)
MCHC RBC-ENTMCNC: 28.5 PG — SIGNIFICANT CHANGE UP (ref 27–34)
MCHC RBC-ENTMCNC: 32.6 GM/DL — SIGNIFICANT CHANGE UP (ref 32–36)
MCV RBC AUTO: 87.5 FL — SIGNIFICANT CHANGE UP (ref 80–100)
MONOCYTES # BLD AUTO: 0.4 K/UL — SIGNIFICANT CHANGE UP (ref 0–0.9)
MONOCYTES NFR BLD AUTO: 7.1 % — SIGNIFICANT CHANGE UP (ref 2–14)
NEUTROPHILS # BLD AUTO: 3.93 K/UL — SIGNIFICANT CHANGE UP (ref 1.8–7.4)
NEUTROPHILS NFR BLD AUTO: 69.2 % — SIGNIFICANT CHANGE UP (ref 43–77)
NRBC # BLD: 0 /100 WBCS — SIGNIFICANT CHANGE UP (ref 0–0)
PLATELET # BLD AUTO: 83 K/UL — LOW (ref 150–400)
POTASSIUM SERPL-MCNC: 3.5 MMOL/L — SIGNIFICANT CHANGE UP (ref 3.5–5.3)
POTASSIUM SERPL-SCNC: 3.5 MMOL/L — SIGNIFICANT CHANGE UP (ref 3.5–5.3)
PROT SERPL-MCNC: 8.5 G/DL — HIGH (ref 6–8.3)
RBC # BLD: 2.63 M/UL — LOW (ref 3.8–5.2)
RBC # FLD: 19.9 % — HIGH (ref 10.3–14.5)
SODIUM SERPL-SCNC: 132 MMOL/L — LOW (ref 135–145)
WBC # BLD: 5.67 K/UL — SIGNIFICANT CHANGE UP (ref 3.8–10.5)
WBC # FLD AUTO: 5.67 K/UL — SIGNIFICANT CHANGE UP (ref 3.8–10.5)

## 2024-05-16 ENCOUNTER — NON-APPOINTMENT (OUTPATIENT)
Age: 54
End: 2024-05-16

## 2024-05-24 ENCOUNTER — RESULT REVIEW (OUTPATIENT)
Age: 54
End: 2024-05-24

## 2024-05-24 ENCOUNTER — APPOINTMENT (OUTPATIENT)
Dept: HEMATOLOGY ONCOLOGY | Facility: CLINIC | Age: 54
End: 2024-05-24

## 2024-05-24 ENCOUNTER — NON-APPOINTMENT (OUTPATIENT)
Age: 54
End: 2024-05-24

## 2024-05-24 LAB
BASOPHILS # BLD AUTO: 0.03 K/UL — SIGNIFICANT CHANGE UP (ref 0–0.2)
BASOPHILS NFR BLD AUTO: 1 % — SIGNIFICANT CHANGE UP (ref 0–2)
EOSINOPHIL # BLD AUTO: 0.06 K/UL — SIGNIFICANT CHANGE UP (ref 0–0.5)
EOSINOPHIL NFR BLD AUTO: 2 % — SIGNIFICANT CHANGE UP (ref 0–6)
HCT VFR BLD CALC: 21.3 % — LOW (ref 34.5–45)
HGB BLD-MCNC: 6.9 G/DL — CRITICAL LOW (ref 11.5–15.5)
HYPOCHROMIA BLD QL: SIGNIFICANT CHANGE UP
LYMPHOCYTES # BLD AUTO: 0.51 K/UL — LOW (ref 1–3.3)
LYMPHOCYTES # BLD AUTO: 16 % — SIGNIFICANT CHANGE UP (ref 13–44)
MACROCYTES BLD QL: SLIGHT — SIGNIFICANT CHANGE UP
MCHC RBC-ENTMCNC: 29.5 PG — SIGNIFICANT CHANGE UP (ref 27–34)
MCHC RBC-ENTMCNC: 32.4 GM/DL — SIGNIFICANT CHANGE UP (ref 32–36)
MCV RBC AUTO: 91 FL — SIGNIFICANT CHANGE UP (ref 80–100)
MICROCYTES BLD QL: SLIGHT — SIGNIFICANT CHANGE UP
MONOCYTES # BLD AUTO: 0.57 K/UL — SIGNIFICANT CHANGE UP (ref 0–0.9)
MONOCYTES NFR BLD AUTO: 18 % — HIGH (ref 2–14)
NEUTROPHILS # BLD AUTO: 1.99 K/UL — SIGNIFICANT CHANGE UP (ref 1.8–7.4)
NEUTROPHILS NFR BLD AUTO: 63 % — SIGNIFICANT CHANGE UP (ref 43–77)
NRBC # BLD: 0 /100 WBCS — SIGNIFICANT CHANGE UP (ref 0–0)
NRBC # BLD: SIGNIFICANT CHANGE UP /100 WBCS (ref 0–0)
PLAT MORPH BLD: NORMAL — SIGNIFICANT CHANGE UP
PLATELET # BLD AUTO: 31 K/UL — LOW (ref 150–400)
POLYCHROMASIA BLD QL SMEAR: SLIGHT — SIGNIFICANT CHANGE UP
RBC # BLD: 2.34 M/UL — LOW (ref 3.8–5.2)
RBC # FLD: 21.6 % — HIGH (ref 10.3–14.5)
RBC BLD AUTO: ABNORMAL
WBC # BLD: 3.16 K/UL — LOW (ref 3.8–10.5)
WBC # FLD AUTO: 3.16 K/UL — LOW (ref 3.8–10.5)

## 2024-05-25 LAB
ALBUMIN SERPL ELPH-MCNC: 4.2 G/DL — SIGNIFICANT CHANGE UP (ref 3.3–5)
ALP SERPL-CCNC: 91 U/L — SIGNIFICANT CHANGE UP (ref 40–120)
ALT FLD-CCNC: 16 U/L — SIGNIFICANT CHANGE UP (ref 10–45)
ANION GAP SERPL CALC-SCNC: 19 MMOL/L — HIGH (ref 5–17)
AST SERPL-CCNC: 83 U/L — HIGH (ref 10–40)
BILIRUB SERPL-MCNC: 2.7 MG/DL — HIGH (ref 0.2–1.2)
BUN SERPL-MCNC: 16 MG/DL — SIGNIFICANT CHANGE UP (ref 7–23)
CALCIUM SERPL-MCNC: 8.9 MG/DL — SIGNIFICANT CHANGE UP (ref 8.4–10.5)
CHLORIDE SERPL-SCNC: 89 MMOL/L — LOW (ref 96–108)
CO2 SERPL-SCNC: 22 MMOL/L — SIGNIFICANT CHANGE UP (ref 22–31)
CREAT SERPL-MCNC: 0.8 MG/DL — SIGNIFICANT CHANGE UP (ref 0.5–1.3)
EGFR: 89 ML/MIN/1.73M2 — SIGNIFICANT CHANGE UP
GLUCOSE SERPL-MCNC: 113 MG/DL — HIGH (ref 70–99)
POTASSIUM SERPL-MCNC: 3.2 MMOL/L — LOW (ref 3.5–5.3)
POTASSIUM SERPL-SCNC: 3.2 MMOL/L — LOW (ref 3.5–5.3)
PROT SERPL-MCNC: 8.3 G/DL — SIGNIFICANT CHANGE UP (ref 6–8.3)
SODIUM SERPL-SCNC: 130 MMOL/L — LOW (ref 135–145)

## 2024-05-31 ENCOUNTER — OUTPATIENT (OUTPATIENT)
Dept: OUTPATIENT SERVICES | Facility: HOSPITAL | Age: 54
LOS: 1 days | Discharge: HOME | End: 2024-05-31

## 2024-05-31 ENCOUNTER — NON-APPOINTMENT (OUTPATIENT)
Age: 54
End: 2024-05-31

## 2024-05-31 DIAGNOSIS — D64.9 ANEMIA, UNSPECIFIED: ICD-10-CM

## 2024-05-31 DIAGNOSIS — Z90.13 ACQUIRED ABSENCE OF BILATERAL BREASTS AND NIPPLES: Chronic | ICD-10-CM

## 2024-05-31 DIAGNOSIS — Z98.891 HISTORY OF UTERINE SCAR FROM PREVIOUS SURGERY: Chronic | ICD-10-CM

## 2024-06-11 RX ORDER — EXEMESTANE 25 MG/1
25 TABLET, FILM COATED ORAL
Qty: 90 | Refills: 3 | Status: ACTIVE | COMMUNITY
Start: 2023-03-03 | End: 1900-01-01

## 2024-06-12 ENCOUNTER — RESULT REVIEW (OUTPATIENT)
Age: 54
End: 2024-06-12

## 2024-06-12 ENCOUNTER — APPOINTMENT (OUTPATIENT)
Dept: HEMATOLOGY ONCOLOGY | Facility: CLINIC | Age: 54
End: 2024-06-12
Payer: COMMERCIAL

## 2024-06-12 VITALS
BODY MASS INDEX: 20.76 KG/M2 | HEART RATE: 112 BPM | SYSTOLIC BLOOD PRESSURE: 106 MMHG | DIASTOLIC BLOOD PRESSURE: 64 MMHG | HEIGHT: 68 IN | TEMPERATURE: 98.8 F | WEIGHT: 137 LBS | OXYGEN SATURATION: 97 %

## 2024-06-12 DIAGNOSIS — C79.51 SECONDARY MALIGNANT NEOPLASM OF BONE: ICD-10-CM

## 2024-06-12 DIAGNOSIS — Z79.899 OTHER LONG TERM (CURRENT) DRUG THERAPY: ICD-10-CM

## 2024-06-12 DIAGNOSIS — D50.9 IRON DEFICIENCY ANEMIA, UNSPECIFIED: ICD-10-CM

## 2024-06-12 DIAGNOSIS — D69.6 THROMBOCYTOPENIA, UNSPECIFIED: ICD-10-CM

## 2024-06-12 DIAGNOSIS — K70.31 ALCOHOLIC CIRRHOSIS OF LIVER WITH ASCITES: ICD-10-CM

## 2024-06-12 DIAGNOSIS — C50.919 MALIGNANT NEOPLASM OF UNSPECIFIED SITE OF UNSPECIFIED FEMALE BREAST: ICD-10-CM

## 2024-06-12 DIAGNOSIS — D64.9 ANEMIA, UNSPECIFIED: ICD-10-CM

## 2024-06-12 DIAGNOSIS — Z79.811 LONG TERM (CURRENT) USE OF AROMATASE INHIBITORS: ICD-10-CM

## 2024-06-12 LAB
BASOPHILS # BLD AUTO: 0.05 K/UL — SIGNIFICANT CHANGE UP (ref 0–0.2)
BASOPHILS NFR BLD AUTO: 1 % — SIGNIFICANT CHANGE UP (ref 0–2)
EOSINOPHIL # BLD AUTO: 0.08 K/UL — SIGNIFICANT CHANGE UP (ref 0–0.5)
EOSINOPHIL NFR BLD AUTO: 1.6 % — SIGNIFICANT CHANGE UP (ref 0–6)
HCT VFR BLD CALC: 23.3 % — LOW (ref 34.5–45)
HGB BLD-MCNC: 7.3 G/DL — LOW (ref 11.5–15.5)
IMM GRANULOCYTES NFR BLD AUTO: 0.2 % — SIGNIFICANT CHANGE UP (ref 0–0.9)
LYMPHOCYTES # BLD AUTO: 0.96 K/UL — LOW (ref 1–3.3)
LYMPHOCYTES # BLD AUTO: 19.2 % — SIGNIFICANT CHANGE UP (ref 13–44)
MCHC RBC-ENTMCNC: 28.4 PG — SIGNIFICANT CHANGE UP (ref 27–34)
MCHC RBC-ENTMCNC: 31.3 GM/DL — LOW (ref 32–36)
MCV RBC AUTO: 90.7 FL — SIGNIFICANT CHANGE UP (ref 80–100)
MONOCYTES # BLD AUTO: 0.51 K/UL — SIGNIFICANT CHANGE UP (ref 0–0.9)
MONOCYTES NFR BLD AUTO: 10.2 % — SIGNIFICANT CHANGE UP (ref 2–14)
NEUTROPHILS # BLD AUTO: 3.38 K/UL — SIGNIFICANT CHANGE UP (ref 1.8–7.4)
NEUTROPHILS NFR BLD AUTO: 67.8 % — SIGNIFICANT CHANGE UP (ref 43–77)
NRBC # BLD: 0 /100 WBCS — SIGNIFICANT CHANGE UP (ref 0–0)
PLATELET # BLD AUTO: 62 K/UL — LOW (ref 150–400)
RBC # BLD: 2.57 M/UL — LOW (ref 3.8–5.2)
RBC # FLD: 18.6 % — HIGH (ref 10.3–14.5)
WBC # BLD: 4.99 K/UL — SIGNIFICANT CHANGE UP (ref 3.8–10.5)
WBC # FLD AUTO: 4.99 K/UL — SIGNIFICANT CHANGE UP (ref 3.8–10.5)

## 2024-06-12 PROCEDURE — 99214 OFFICE O/P EST MOD 30 MIN: CPT

## 2024-06-12 PROCEDURE — G2211 COMPLEX E/M VISIT ADD ON: CPT | Mod: NC

## 2024-06-12 RX ORDER — POTASSIUM CHLORIDE 750 MG/1
10 TABLET, EXTENDED RELEASE ORAL DAILY
Qty: 90 | Refills: 0 | Status: DISCONTINUED | COMMUNITY
Start: 2024-02-01 | End: 2024-06-12

## 2024-06-12 RX ORDER — FUROSEMIDE 40 MG/1
40 TABLET ORAL
Qty: 90 | Refills: 0 | Status: ACTIVE | COMMUNITY
Start: 2023-12-12 | End: 1900-01-01

## 2024-06-12 RX ORDER — ESCITALOPRAM OXALATE 10 MG/1
10 TABLET ORAL
Qty: 90 | Refills: 3 | Status: ACTIVE | COMMUNITY
Start: 2023-12-12 | End: 1900-01-01

## 2024-06-12 RX ORDER — RIBOCICLIB 200 MG/1
200 TABLET, FILM COATED ORAL
Qty: 42 | Refills: 5 | Status: DISCONTINUED | COMMUNITY
Start: 2024-03-14 | End: 2024-06-12

## 2024-06-13 PROBLEM — Z79.811 USE OF EXEMESTANE (AROMASIN): Status: ACTIVE | Noted: 2024-03-13

## 2024-06-13 PROBLEM — D64.9 ANEMIA, UNSPECIFIED TYPE: Status: ACTIVE | Noted: 2024-03-12

## 2024-06-13 PROBLEM — Z79.899 ON ANTINEOPLASTIC CHEMOTHERAPY: Status: ACTIVE | Noted: 2024-03-13

## 2024-06-13 PROBLEM — K70.31 ALCOHOLIC CIRRHOSIS OF LIVER WITH ASCITES: Status: ACTIVE | Noted: 2022-12-07

## 2024-06-13 PROBLEM — C79.51 METASTATIC BONE TUMOR: Status: ACTIVE | Noted: 2023-03-03

## 2024-06-13 PROBLEM — D69.6 THROMBOCYTOPENIA: Status: ACTIVE | Noted: 2022-12-07

## 2024-06-13 PROBLEM — D50.9 IRON DEFICIENCY ANEMIA, UNSPECIFIED IRON DEFICIENCY ANEMIA TYPE: Status: ACTIVE | Noted: 2024-03-12

## 2024-06-13 PROBLEM — C50.919 BREAST CANCER, FEMALE: Status: ACTIVE | Noted: 2023-01-10

## 2024-06-13 LAB
ALBUMIN SERPL ELPH-MCNC: 4.2 G/DL — SIGNIFICANT CHANGE UP (ref 3.3–5)
ALP SERPL-CCNC: 86 U/L — SIGNIFICANT CHANGE UP (ref 40–120)
ALT FLD-CCNC: 20 U/L — SIGNIFICANT CHANGE UP (ref 10–45)
ANION GAP SERPL CALC-SCNC: 12 MMOL/L — SIGNIFICANT CHANGE UP (ref 5–17)
AST SERPL-CCNC: 123 U/L — HIGH (ref 10–40)
BILIRUB SERPL-MCNC: 1.7 MG/DL — HIGH (ref 0.2–1.2)
BUN SERPL-MCNC: 13 MG/DL — SIGNIFICANT CHANGE UP (ref 7–23)
CALCIUM SERPL-MCNC: 8.2 MG/DL — LOW (ref 8.4–10.5)
CHLORIDE SERPL-SCNC: 94 MMOL/L — LOW (ref 96–108)
CO2 SERPL-SCNC: 26 MMOL/L — SIGNIFICANT CHANGE UP (ref 22–31)
CREAT SERPL-MCNC: 0.77 MG/DL — SIGNIFICANT CHANGE UP (ref 0.5–1.3)
EGFR: 93 ML/MIN/1.73M2 — SIGNIFICANT CHANGE UP
ERYTHROCYTE [SEDIMENTATION RATE] IN BLOOD: 46 MM/HR — HIGH (ref 0–20)
FERRITIN SERPL-MCNC: 32 NG/ML — SIGNIFICANT CHANGE UP (ref 13–330)
GLUCOSE SERPL-MCNC: 119 MG/DL — HIGH (ref 70–99)
IRON SATN MFR SERPL: 24 UG/DL — LOW (ref 30–160)
IRON SATN MFR SERPL: 5 % — LOW (ref 14–50)
POTASSIUM SERPL-MCNC: 3.7 MMOL/L — SIGNIFICANT CHANGE UP (ref 3.5–5.3)
POTASSIUM SERPL-SCNC: 3.7 MMOL/L — SIGNIFICANT CHANGE UP (ref 3.5–5.3)
PROT SERPL-MCNC: 8.2 G/DL — SIGNIFICANT CHANGE UP (ref 6–8.3)
SODIUM SERPL-SCNC: 132 MMOL/L — LOW (ref 135–145)
TIBC SERPL-MCNC: 480 UG/DL — HIGH (ref 220–430)
UIBC SERPL-MCNC: 456 UG/DL — HIGH (ref 110–370)

## 2024-06-14 DIAGNOSIS — C79.51 SECONDARY MALIGNANT NEOPLASM OF BONE: ICD-10-CM

## 2024-06-14 DIAGNOSIS — C50.919 MALIGNANT NEOPLASM OF UNSPECIFIED SITE OF UNSPECIFIED FEMALE BREAST: ICD-10-CM

## 2024-06-14 NOTE — ASSESSMENT
[FreeTextEntry1] : Patient is a 53 y.o. with alcoholic liver cirrhosis (chronic anemia/ thrombocytopenia), hx of an ER+, HER2-, left breast cancer in 2018 s/p b/l mastectomies T2N0, no adjuvant therapy, now with biopsy proven bone mets.   12/2022 - Bone mets with sternal mass and L5 met. No other bone lesions on PET, no visceral disease. Biopsy of sternal met confirmed recurrent disease ER+, SD -, HER2 1+.   1/2023 - Started on Letrozole, did not tolerate. Changed to exemestane and tolerating well. s/p RT to sites of oligomets (L5, sternum) completed 3/2/23. Recent PET in Feb 2024 showed response to therapy.  3/21/24 - Started on Kisqali 400mg D1-21 Q28. Today D15 C1.  Unable to complete 1st cycle due to cytopenias. Also unable to complete 2nd cycle due to cytopenias despite dose reduction. Patient now back from a vacation - very fatigued, weak, depressed. Has no f/u with cirrhosis MD and needs diuretics refilled.   Plan:  1. Onc - Patient doing well on Exemestane.  Unable to tolerate standard or reduced doses of Kisqali due to underlying cirrhosis.  We were contemplating using with even lower doses due to her situation, however she is weak and depressed at the moment.  Will hold for now and reconsider restarting when she is feeling better.  2. Cirrhosis - Patient asked for a renewal of her diuretics.  Told we are not the ones to do this - but since she has no appt set up with the hepatologist was done x 1. The importance of seeing a hepatologist on a regular basis was reviewed.   3. Cytopenias - In regards to her anemia she may have some iron deficiency due to GI bleeding from varices - will check iron studies. Patient informed about R/B of IV iron and consent form signed.  Will call her to schedule IV iron if needed.  4. No tumor markers to follow - consider repeating scans in a few months.  D/W patient ? MRI brain. Both she and her  do not feel there is a need for this - they do not feel her falling was from brain lesions but truly due to weakness.  PE ~ 1 month.   Dr. Silver Briggs (PCP) Dr. Frida Hernandez (GI)

## 2024-06-14 NOTE — HISTORY OF PRESENT ILLNESS
[Disease: _____________________] : Disease: [unfilled] [AJCC Stage: ____] : AJCC Stage: [unfilled] [de-identified] : MITESH HAYDEN is a 53 y.o. F with a PMH significant for Breast Ca in 2018 s/p mastectomy (Dr. Parson, no chemo/RT), hx of Cocaine use (quit in 1999), former smoker (quit in 2015), alcohol abuse disorder (no history of seizures/delirium tremens), COVID-19 10/2022, and cirrhosis with esophageal varices, who we are following for cytopenias from her cirrhosis and metastatic and ER+, HER2-, metastatic breast cancer (bone mets).  2018 - Left breast cancer (Dr. Parson) Opted for bilat mastectomies. States that LN's were neg. Not sure what stage breast cancer. No adjuvant chemo, hormonal tx, no RT. ******** 11/28/22 - Presented to  ER with SOB, cough, abdominal distention, and a 16 lbs weight loss. WBC: 9.62, Hgb: 8.6, Hct: 24.8, MCV: 111.2, Plts: 100, PT: 20.1, PTT: 37.5, INR: 1.72 CTA Chest - negative for PE. Small L pleural effusion. CT A/P - Advanced cirrhosis with extensive esophageal and paraesophageal varices. Enlarged spleen measuring 15.6cm. Moderate volume ascites. Diffuse lytic change and areas of cortical destruction involving the sternum suspicious for metastatic disease. 11/29/22 - Diagnostic paracentesis - consistent with portal HTN and no evidence of SBP. Cytology was negative for malignant cells. EGD - trace esophageal varices, portal gastropathy, gastric polyps - H. Pylori positive. Colonoscopy showed internal hemorrhoids. Started on Lasix 40mg PO and spironolactone 100mg PO for management of ascites. Biopsy of sternum not done due to INR not meeting below 1.5 per IR.  12/1/22 - WBC: 7.42, Hgb: 7.9, Hct: 23.4, MCV: 112.0, Plts: 106. Total bili was 8.1 on admission, 4.6 at discharge. B12: 909, Folate: 12.7, Ferritin: 784, TSAT: 52%, Albumin: 1.8, Haptoglobin: 22, LDH: 217  12/27/22 - Biopsy of sternal mass - metastatic adenocarcinoma c/w breast primary ER positive strong, [place] negative HER2 1-2 + FISH negative.  1/17/23 - PET - destructive avid sternal mass. L5 with focal avidity and lucent changes- suspect met. Resolution of L pleural effusion. Marked improvement in ascites. No abnormal FDG uptake in abd pelvis.  1/2023 - Started Letrozole - did not tolerate (severe disabling arthralgias). 4/2023 - Changed to Exemestane. 3/2/23 - Completed RT to sites of oligometastatic disease (sternum and L5) (Dr. Ferrera). Plan was to add CDK4/6 inhibitor, but she did not follow up here for almost one year.  2/16/24 - PET/CT - Interval response to therapy with decrease in FDG avidity of previously seen bone lesions. Mild residual activity, for which attention on follow-up is recommended to evaluate for stability. Mildly FDG avid T9 lytic lesion, not evident on CT 5/21/23 or prior PET CT 1/17/23. It is unclear if this is also treated disease or potentially degenerative. Consider interval short-term follow-up to evaluate for stability. Fracture in the posterior elements of L3, along with likely posttraumatic change in a left posterior rib and left posterior elements of L4. FDG uptake in paraspinous muscles. This may be due to recent fall.  3/21/24 - Started Kisqali 400mg D1-21 Q28.  Unable to complete 1st cycle due to cytopenias.  Also unable to complete 2nd cycle due to cytopenias despite dose reduction.   [de-identified] : ER positive strong, LA negative HER2 1-2+/FISH negative [de-identified] : Patient here is wheelchair today accompanied by her .  She went on a cruise to the [facility] - had a good time.  She reports she has been using a wheelchair regularly since she fell down the stairs in mid-May.  She reports leg weakness - mostly muscle weakness - no dizziness or unsteadiness.  Muscles just give out. She also states she thinks she can do more than she actually can and needs to mentally adjust to this.   She reports she has been very fatigued.  She also has increased depression.   She did have a Hgb 6.9 on 5/24/24 prior to going on her cruise.  We had instructed her to go to the ER for 2U PRBC's prior to leaving but she declined.  She has a bit of a macular skin rash on her chest - she states this is from her cirrhosis. She states she "does not have" a cirrhosis doctor.  Did not get along with Dr. Joseph.   reports they saw Dr. Hernandez on January - she does not recall.  She needed her diuretics renewed - has no appointment with MD to have done.  Patient also admitted to having been drinking on occasion.  She has not been binging though.  Denies any hemoptysis or other bleeding.  Only has 6 pills of Kisqali left,  Denies any changes in her family, medical, or social history since her last visit of 4/3/24.

## 2024-06-14 NOTE — REVIEW OF SYSTEMS
[Patient Intake Form Reviewed] : Patient intake form was reviewed [Fatigue] : fatigue [Diarrhea: Grade 0] : Diarrhea: Grade 0 [Insomnia] : insomnia [Anxiety] : anxiety [Depression] : depression [Negative] : Allergic/Immunologic [Muscle Weakness] : muscle weakness [Difficulty Walking] : difficulty walking [FreeTextEntry7] : nausea/queasiness [FreeTextEntry9] : increased falling - now using wheelchair, shower chair, etc.  [de-identified] : States always itchy - no change

## 2024-06-14 NOTE — PHYSICAL EXAM
[Normal] : affect appropriate [de-identified] : anicteric [de-identified] : no c/c/e [de-identified] : not distended [de-identified] : Can raise legs without problems [de-identified] : pale, macular rash on chest, upper arms

## 2024-06-21 ENCOUNTER — APPOINTMENT (OUTPATIENT)
Dept: INFUSION THERAPY | Facility: CLINIC | Age: 54
End: 2024-06-21

## 2024-06-24 DIAGNOSIS — D50.9 IRON DEFICIENCY ANEMIA, UNSPECIFIED: ICD-10-CM

## 2024-06-25 RX ORDER — SPIRONOLACTONE 100 MG/1
100 TABLET ORAL DAILY
Qty: 30 | Refills: 0 | Status: ACTIVE | COMMUNITY
Start: 2023-12-12 | End: 1900-01-01

## 2024-06-28 ENCOUNTER — APPOINTMENT (OUTPATIENT)
Dept: INFUSION THERAPY | Facility: CLINIC | Age: 54
End: 2024-06-28

## 2024-07-03 ENCOUNTER — APPOINTMENT (OUTPATIENT)
Dept: INFUSION THERAPY | Facility: CLINIC | Age: 54
End: 2024-07-03

## 2024-07-10 ENCOUNTER — RESULT REVIEW (OUTPATIENT)
Age: 54
End: 2024-07-10

## 2024-07-10 ENCOUNTER — APPOINTMENT (OUTPATIENT)
Dept: HEMATOLOGY ONCOLOGY | Facility: CLINIC | Age: 54
End: 2024-07-10
Payer: COMMERCIAL

## 2024-07-10 ENCOUNTER — APPOINTMENT (OUTPATIENT)
Dept: INFUSION THERAPY | Facility: CLINIC | Age: 54
End: 2024-07-10
Payer: COMMERCIAL

## 2024-07-10 DIAGNOSIS — D50.9 IRON DEFICIENCY ANEMIA, UNSPECIFIED: ICD-10-CM

## 2024-07-10 DIAGNOSIS — I85.00 ESOPHAGEAL VARICES W/OUT BLEEDING: ICD-10-CM

## 2024-07-10 DIAGNOSIS — R29.6 REPEATED FALLS: ICD-10-CM

## 2024-07-10 DIAGNOSIS — C50.919 MALIGNANT NEOPLASM OF UNSPECIFIED SITE OF UNSPECIFIED FEMALE BREAST: ICD-10-CM

## 2024-07-10 DIAGNOSIS — Z79.811 LONG TERM (CURRENT) USE OF AROMATASE INHIBITORS: ICD-10-CM

## 2024-07-10 DIAGNOSIS — C79.51 SECONDARY MALIGNANT NEOPLASM OF BONE: ICD-10-CM

## 2024-07-10 DIAGNOSIS — K70.31 ALCOHOLIC CIRRHOSIS OF LIVER WITH ASCITES: ICD-10-CM

## 2024-07-10 DIAGNOSIS — D69.6 THROMBOCYTOPENIA, UNSPECIFIED: ICD-10-CM

## 2024-07-10 DIAGNOSIS — Z79.899 OTHER LONG TERM (CURRENT) DRUG THERAPY: ICD-10-CM

## 2024-07-10 LAB
BASOPHILS # BLD AUTO: 0.06 K/UL — SIGNIFICANT CHANGE UP (ref 0–0.2)
BASOPHILS NFR BLD AUTO: 1.7 % — SIGNIFICANT CHANGE UP (ref 0–2)
EOSINOPHIL # BLD AUTO: 0.07 K/UL — SIGNIFICANT CHANGE UP (ref 0–0.5)
EOSINOPHIL NFR BLD AUTO: 2 % — SIGNIFICANT CHANGE UP (ref 0–6)
HCT VFR BLD CALC: 29.3 % — LOW (ref 34.5–45)
HGB BLD-MCNC: 10 G/DL — LOW (ref 11.5–15.5)
IMM GRANULOCYTES NFR BLD AUTO: 0.3 % — SIGNIFICANT CHANGE UP (ref 0–0.9)
LYMPHOCYTES # BLD AUTO: 0.74 K/UL — LOW (ref 1–3.3)
LYMPHOCYTES # BLD AUTO: 21 % — SIGNIFICANT CHANGE UP (ref 13–44)
MCHC RBC-ENTMCNC: 32.6 PG — SIGNIFICANT CHANGE UP (ref 27–34)
MCHC RBC-ENTMCNC: 34.1 GM/DL — SIGNIFICANT CHANGE UP (ref 32–36)
MCV RBC AUTO: 95.4 FL — SIGNIFICANT CHANGE UP (ref 80–100)
MONOCYTES # BLD AUTO: 0.54 K/UL — SIGNIFICANT CHANGE UP (ref 0–0.9)
MONOCYTES NFR BLD AUTO: 15.3 % — HIGH (ref 2–14)
NEUTROPHILS # BLD AUTO: 2.11 K/UL — SIGNIFICANT CHANGE UP (ref 1.8–7.4)
NEUTROPHILS NFR BLD AUTO: 59.7 % — SIGNIFICANT CHANGE UP (ref 43–77)
NRBC # BLD: 0 /100 WBCS — SIGNIFICANT CHANGE UP (ref 0–0)
PLATELET # BLD AUTO: 61 K/UL — LOW (ref 150–400)
RBC # BLD: 3.07 M/UL — LOW (ref 3.8–5.2)
RBC # FLD: 23.7 % — HIGH (ref 10.3–14.5)
WBC # BLD: 3.53 K/UL — LOW (ref 3.8–10.5)
WBC # FLD AUTO: 3.53 K/UL — LOW (ref 3.8–10.5)

## 2024-07-10 PROCEDURE — 99214 OFFICE O/P EST MOD 30 MIN: CPT

## 2024-07-10 PROCEDURE — G2211 COMPLEX E/M VISIT ADD ON: CPT | Mod: NC

## 2024-07-17 PROBLEM — R29.6 FREQUENT FALLS: Status: ACTIVE | Noted: 2024-07-17

## 2024-07-17 PROBLEM — Z79.899 ON ANTINEOPLASTIC CHEMOTHERAPY: Status: RESOLVED | Noted: 2024-03-13 | Resolved: 2024-07-17

## 2024-07-18 ENCOUNTER — APPOINTMENT (OUTPATIENT)
Dept: INFUSION THERAPY | Facility: CLINIC | Age: 54
End: 2024-07-18

## 2024-08-11 ENCOUNTER — OUTPATIENT (OUTPATIENT)
Dept: OUTPATIENT SERVICES | Facility: HOSPITAL | Age: 54
LOS: 1 days | Discharge: HOME | End: 2024-08-11

## 2024-08-11 DIAGNOSIS — C79.51 SECONDARY MALIGNANT NEOPLASM OF BONE: ICD-10-CM

## 2024-08-11 DIAGNOSIS — C50.919 MALIGNANT NEOPLASM OF UNSPECIFIED SITE OF UNSPECIFIED FEMALE BREAST: ICD-10-CM

## 2024-08-11 DIAGNOSIS — D50.9 IRON DEFICIENCY ANEMIA, UNSPECIFIED: ICD-10-CM

## 2024-08-11 DIAGNOSIS — Z90.13 ACQUIRED ABSENCE OF BILATERAL BREASTS AND NIPPLES: Chronic | ICD-10-CM

## 2024-08-20 ENCOUNTER — RESULT REVIEW (OUTPATIENT)
Age: 54
End: 2024-08-20

## 2024-08-20 ENCOUNTER — APPOINTMENT (OUTPATIENT)
Dept: HEMATOLOGY ONCOLOGY | Facility: CLINIC | Age: 54
End: 2024-08-20

## 2024-08-20 LAB
BASOPHILS # BLD AUTO: 0.05 K/UL — SIGNIFICANT CHANGE UP (ref 0–0.2)
BASOPHILS NFR BLD AUTO: 0.7 % — SIGNIFICANT CHANGE UP (ref 0–2)
EOSINOPHIL # BLD AUTO: 0.12 K/UL — SIGNIFICANT CHANGE UP (ref 0–0.5)
EOSINOPHIL NFR BLD AUTO: 1.7 % — SIGNIFICANT CHANGE UP (ref 0–6)
HCT VFR BLD CALC: 28.8 % — LOW (ref 34.5–45)
HGB BLD-MCNC: 10.3 G/DL — LOW (ref 11.5–15.5)
IMM GRANULOCYTES NFR BLD AUTO: 0.3 % — SIGNIFICANT CHANGE UP (ref 0–0.9)
LYMPHOCYTES # BLD AUTO: 1.13 K/UL — SIGNIFICANT CHANGE UP (ref 1–3.3)
LYMPHOCYTES # BLD AUTO: 15.7 % — SIGNIFICANT CHANGE UP (ref 13–44)
MCHC RBC-ENTMCNC: 35.8 GM/DL — SIGNIFICANT CHANGE UP (ref 32–36)
MCHC RBC-ENTMCNC: 37.3 PG — HIGH (ref 27–34)
MCV RBC AUTO: 104.3 FL — HIGH (ref 80–100)
MONOCYTES # BLD AUTO: 0.41 K/UL — SIGNIFICANT CHANGE UP (ref 0–0.9)
MONOCYTES NFR BLD AUTO: 5.7 % — SIGNIFICANT CHANGE UP (ref 2–14)
NEUTROPHILS # BLD AUTO: 5.45 K/UL — SIGNIFICANT CHANGE UP (ref 1.8–7.4)
NEUTROPHILS NFR BLD AUTO: 75.9 % — SIGNIFICANT CHANGE UP (ref 43–77)
NRBC # BLD: 0 /100 WBCS — SIGNIFICANT CHANGE UP (ref 0–0)
PLATELET # BLD AUTO: 46 K/UL — LOW (ref 150–400)
RBC # BLD: 2.76 M/UL — LOW (ref 3.8–5.2)
RBC # FLD: 13.2 % — SIGNIFICANT CHANGE UP (ref 10.3–14.5)
WBC # BLD: 7.18 K/UL — SIGNIFICANT CHANGE UP (ref 3.8–10.5)
WBC # FLD AUTO: 7.18 K/UL — SIGNIFICANT CHANGE UP (ref 3.8–10.5)

## 2024-08-21 LAB
ERYTHROCYTE [SEDIMENTATION RATE] IN BLOOD: 48 MM/HR — HIGH (ref 0–20)
FERRITIN SERPL-MCNC: 1478 NG/ML — HIGH (ref 13–330)
IRON SATN MFR SERPL: 247 UG/DL — HIGH (ref 30–160)
IRON SATN MFR SERPL: 92 % — HIGH (ref 14–50)
TIBC SERPL-MCNC: 270 UG/DL — SIGNIFICANT CHANGE UP (ref 220–430)
UIBC SERPL-MCNC: 23 UG/DL — LOW (ref 110–370)

## 2024-08-22 ENCOUNTER — APPOINTMENT (OUTPATIENT)
Dept: HEMATOLOGY ONCOLOGY | Facility: CLINIC | Age: 54
End: 2024-08-22

## 2024-09-03 ENCOUNTER — RESULT REVIEW (OUTPATIENT)
Age: 54
End: 2024-09-03

## 2024-09-03 ENCOUNTER — APPOINTMENT (OUTPATIENT)
Dept: HEMATOLOGY ONCOLOGY | Facility: CLINIC | Age: 54
End: 2024-09-03
Payer: COMMERCIAL

## 2024-09-03 VITALS
TEMPERATURE: 98.3 F | OXYGEN SATURATION: 98 % | BODY MASS INDEX: 19.85 KG/M2 | WEIGHT: 131 LBS | DIASTOLIC BLOOD PRESSURE: 57 MMHG | HEIGHT: 68 IN | SYSTOLIC BLOOD PRESSURE: 93 MMHG | HEART RATE: 104 BPM

## 2024-09-03 DIAGNOSIS — C50.919 MALIGNANT NEOPLASM OF UNSPECIFIED SITE OF UNSPECIFIED FEMALE BREAST: ICD-10-CM

## 2024-09-03 DIAGNOSIS — D50.9 IRON DEFICIENCY ANEMIA, UNSPECIFIED: ICD-10-CM

## 2024-09-03 DIAGNOSIS — Z79.811 LONG TERM (CURRENT) USE OF AROMATASE INHIBITORS: ICD-10-CM

## 2024-09-03 DIAGNOSIS — Z86.39 PERSONAL HISTORY OF OTHER ENDOCRINE, NUTRITIONAL AND METABOLIC DISEASE: ICD-10-CM

## 2024-09-03 DIAGNOSIS — D64.9 ANEMIA, UNSPECIFIED: ICD-10-CM

## 2024-09-03 DIAGNOSIS — C79.51 SECONDARY MALIGNANT NEOPLASM OF BONE: ICD-10-CM

## 2024-09-03 DIAGNOSIS — D69.6 THROMBOCYTOPENIA, UNSPECIFIED: ICD-10-CM

## 2024-09-03 LAB
BASOPHILS # BLD AUTO: 0.08 K/UL — SIGNIFICANT CHANGE UP (ref 0–0.2)
BASOPHILS NFR BLD AUTO: 0.5 % — SIGNIFICANT CHANGE UP (ref 0–2)
EOSINOPHIL # BLD AUTO: 0.05 K/UL — SIGNIFICANT CHANGE UP (ref 0–0.5)
EOSINOPHIL NFR BLD AUTO: 0.3 % — SIGNIFICANT CHANGE UP (ref 0–6)
HCT VFR BLD CALC: 26.8 % — LOW (ref 34.5–45)
HGB BLD-MCNC: 9.9 G/DL — LOW (ref 11.5–15.5)
IMM GRANULOCYTES NFR BLD AUTO: 0.6 % — SIGNIFICANT CHANGE UP (ref 0–0.9)
LYMPHOCYTES # BLD AUTO: 1.01 K/UL — SIGNIFICANT CHANGE UP (ref 1–3.3)
LYMPHOCYTES # BLD AUTO: 6.8 % — LOW (ref 13–44)
MCHC RBC-ENTMCNC: 36.9 GM/DL — HIGH (ref 32–36)
MCHC RBC-ENTMCNC: 39.1 PG — HIGH (ref 27–34)
MCV RBC AUTO: 105.9 FL — HIGH (ref 80–100)
MONOCYTES # BLD AUTO: 1.05 K/UL — HIGH (ref 0–0.9)
MONOCYTES NFR BLD AUTO: 7.1 % — SIGNIFICANT CHANGE UP (ref 2–14)
NEUTROPHILS # BLD AUTO: 12.58 K/UL — HIGH (ref 1.8–7.4)
NEUTROPHILS NFR BLD AUTO: 84.7 % — HIGH (ref 43–77)
NRBC # BLD: 0 /100 WBCS — SIGNIFICANT CHANGE UP (ref 0–0)
PLATELET # BLD AUTO: 71 K/UL — LOW (ref 150–400)
RBC # BLD: 2.53 M/UL — LOW (ref 3.8–5.2)
RBC # FLD: 13.1 % — SIGNIFICANT CHANGE UP (ref 10.3–14.5)
WBC # BLD: 14.86 K/UL — HIGH (ref 3.8–10.5)
WBC # FLD AUTO: 14.86 K/UL — HIGH (ref 3.8–10.5)

## 2024-09-03 PROCEDURE — 99214 OFFICE O/P EST MOD 30 MIN: CPT

## 2024-09-03 PROCEDURE — G2211 COMPLEX E/M VISIT ADD ON: CPT | Mod: NC

## 2024-09-04 DIAGNOSIS — D64.9 ANEMIA, UNSPECIFIED: ICD-10-CM

## 2024-09-04 DIAGNOSIS — D69.6 THROMBOCYTOPENIA, UNSPECIFIED: ICD-10-CM

## 2024-09-04 PROBLEM — Z86.39 HISTORY OF HYPOKALEMIA: Status: RESOLVED | Noted: 2024-02-01 | Resolved: 2024-09-04

## 2024-09-04 NOTE — ASSESSMENT
[FreeTextEntry1] : Patient is a 54 y.o. with alcoholic liver cirrhosis (chronic anemia/ thrombocytopenia), and hx of an ER+, HER2-, left breast cancer in 2018 s/p b/l mastectomies T2N0, no adjuvant therapy, now with biopsy proven bone mets.   12/2022 - Bone mets with sternal mass and L5 met. No other bone lesions on PET, no visceral disease. Biopsy of sternal met confirmed recurrent disease ER+, [place] -, HER2 1+.   1/2023 - Started on Letrozole, did not tolerate. Changed to exemestane and tolerating well. s/p RT to sites of oligomets (L5, sternum) completed 3/2/23. 2/2024 - PET/CT showed response to therapy. 3/21/24 -Tried Kisqali - unable to tolerate even at lowest dose due to cytopenias. 6/2024 - DOREEN - s/p Feraheme x 4  Plan:  1. Onc - Patient doing well on Exemestane.  Unable to tolerate standard or reduced doses of Kisqali due to underlying cirrhosis.  Does not have a tumor marker to follow.  Last PET/CT 2/2024 - given Rx for restaging PET/CT scan.  Had d/w MRI brain in the past due to her falling - she has declined.  2. Cirrhosis - Has made an appointment with Dr. Torres 9/26/24. 3. Cytopenias - In part due to cirrhosis.   Anemia multifactorial with component of iron deficiency - s/p IV Feraheme. Great response hitherto with improvement in Hgb from 7.3 -> 10gm/dl, however now with increased iron stores.   Suspect will decrease quickly, but if needs iron replacement in the future she will need much less than expected.  Patient has PE 10/16/24 - aware to get PET/CT prior to this appt.  Dr. Silver Briggs (PCP) Dr. Carolyn Hernandez (GI)

## 2024-09-04 NOTE — HISTORY OF PRESENT ILLNESS
[Disease: _____________________] : Disease: [unfilled] [AJCC Stage: ____] : AJCC Stage: [unfilled] [de-identified] : MITESH HAYDEN is a 54 y.o. F with a PMH significant for Breast Ca in 2018 s/p mastectomy (Dr. Parson, no chemo/RT), hx of Cocaine use (quit in 1999), former smoker (quit in 2015), alcohol abuse disorder (no history of seizures/delirium tremens), COVID-19 10/2022, and cirrhosis with esophageal varices, who we are following for cytopenias from her cirrhosis and metastatic and ER+, HER2-, metastatic breast cancer (bone mets).  2018 - Left breast cancer (Dr. Parson) Opted for bilat mastectomies. States that LN's were neg. Not sure what stage breast cancer. No adjuvant chemo, hormonal tx, no RT. ******** 11/28/22 - Presented to  ER with SOB, cough, abdominal distention, and a 16 lbs weight loss. WBC: 9.62, Hgb: 8.6, Hct: 24.8, MCV: 111.2, Plts: 100, PT: 20.1, PTT: 37.5, INR: 1.72 CTA Chest - negative for PE. Small L pleural effusion. CT A/P - Advanced cirrhosis with extensive esophageal and paraesophageal varices. Enlarged spleen measuring 15.6cm. Moderate volume ascites. Diffuse lytic change and areas of cortical destruction involving the sternum suspicious for metastatic disease. 11/29/22 - Diagnostic paracentesis - consistent with portal HTN and no evidence of SBP. Cytology was negative for malignant cells. EGD - trace esophageal varices, portal gastropathy, gastric polyps - H. Pylori positive. Colonoscopy showed internal hemorrhoids. Started on Lasix 40mg PO and spironolactone 100mg PO for management of ascites. Biopsy of sternum not done due to INR not meeting below 1.5 per IR.  12/1/22 - WBC: 7.42, Hgb: 7.9, Hct: 23.4, MCV: 112.0, Plts: 106. Total bili was 8.1 on admission, 4.6 at discharge. B12: 909, Folate: 12.7, Ferritin: 784, TSAT: 52%, Albumin: 1.8, Haptoglobin: 22, LDH: 217  12/27/22 - Biopsy of sternal mass - metastatic adenocarcinoma c/w breast primary ER positive strong, LA negative HER2 1-2 + FISH negative.  1/17/23 - PET - destructive avid sternal mass. L5 with focal avidity and lucent changes- suspect met. Resolution of L pleural effusion. Marked improvement in ascites. No abnormal FDG uptake in abd pelvis.  1/2023 - Started Letrozole - did not tolerate (severe disabling arthralgias). 4/2023 - Changed to Exemestane. 3/2/23 - Completed RT to sites of oligometastatic disease (sternum and L5) (Dr. Ferrera). Plan was to add CDK4/6 inhibitor, but she did not follow up here for almost one year.  2/16/24 - PET/CT - Interval response to therapy with decrease in FDG avidity of previously seen bone lesions. Mild residual activity, for which attention on follow-up is recommended to evaluate for stability. Mildly FDG avid T9 lytic lesion, not evident on CT 5/21/23 or prior PET CT 1/17/23. It is unclear if this is also treated disease or potentially degenerative. Consider interval short-term follow-up to evaluate for stability. Fracture in the posterior elements of L3, along with likely posttraumatic change in a left posterior rib and left posterior elements of L4. FDG uptake in paraspinous muscles. This may be due to recent fall.  3/21/24 - Started Kisqali 400mg D1-21 Q28.  Unable to complete 1st cycle due to cytopenias.  Also unable to complete 2nd cycle due to cytopenias despite dose reduction. Not rechallenged.   [de-identified] : ER positive strong, [place] negative HER2 1-2+/FISH negative [de-identified] : No Ca27-29 to follow.  [de-identified] : 6/12/24 - Hgb: 7.3, MCV: 91, Ferritin: 32, TSAT: 5%, ESR: 46 6/21/24 - 7/18/24 - Feraheme x 4 8/20/24 - Hgb: 10.3, MCV: 104.3, Ferritin: 1478, TSAT: 92%, ESR: 48  Patient here in wheelchair today -  in waiting room today.  She reports that since the IV iron she has been able to be more active.  Has not fallen in a while.  Using walker at home.  Last fall was last week of June.   She reports her father will be coming out to stay with her x 3 months.    Still has appointment with Dr. Torres 9/26/24.       Denies any other changes in her family, medical, or social history since her last visit of 6/12/24.

## 2024-09-04 NOTE — REVIEW OF SYSTEMS
[Patient Intake Form Reviewed] : Patient intake form was reviewed [Constipation] : constipation [Muscle Weakness] : muscle weakness [Difficulty Walking] : difficulty walking [Anxiety] : anxiety [Depression] : depression [Negative] : Allergic/Immunologic [Fatigue] : fatigue [FreeTextEntry7] : loss of appetite, diarrhea [FreeTextEntry9] : states hasn't fallen since last visit.  [de-identified] : States always itchy - no change [de-identified] : sleep problems

## 2024-09-04 NOTE — ASSESSMENT
[FreeTextEntry1] : Patient is a 54 y.o. with alcoholic liver cirrhosis (chronic anemia/ thrombocytopenia), and hx of an ER+, HER2-, left breast cancer in 2018 s/p b/l mastectomies T2N0, no adjuvant therapy, now with biopsy proven bone mets.   12/2022 - Bone mets with sternal mass and L5 met. No other bone lesions on PET, no visceral disease. Biopsy of sternal met confirmed recurrent disease ER+, [place] -, HER2 1+.   1/2023 - Started on Letrozole, did not tolerate. Changed to exemestane and tolerating well. s/p RT to sites of oligomets (L5, sternum) completed 3/2/23. 2/2024 - PET/CT showed response to therapy. 3/21/24 -Tried Kisqali - unable to tolerate even at lowest dose due to cytopenias. 6/2024 - DOREEN - s/p Feraheme x 4  Plan:  1. Onc - Patient doing well on Exemestane.  Unable to tolerate standard or reduced doses of Kisqali due to underlying cirrhosis.  Does not have a tumor marker to follow.  Last PET/CT 2/2024 - given Rx for restaging PET/CT scan.  Had d/w MRI brain in the past due to her falling - she has declined.  2. Cirrhosis - Has made an appointment with Dr. Torres 9/26/24. 3. Cytopenias - In part due to cirrhosis.   Anemia multifactorial with component of iron deficiency - s/p IV Feraheme. Great response hitherto with improvement in Hgb from 7.3 -> 10gm/dl, however now with increased iron stores.   Suspect will decrease quickly, but if needs iron replacement in the future she will need much less than expected.  Patient has PE 10/16/24 - aware to get PET/CT prior to this appt.  Dr. Silver Briggs (PCP) Dr. Carolyn Hernandez (GI)

## 2024-09-04 NOTE — HISTORY OF PRESENT ILLNESS
[Disease: _____________________] : Disease: [unfilled] [AJCC Stage: ____] : AJCC Stage: [unfilled] [de-identified] : MITESH HAYDEN is a 54 y.o. F with a PMH significant for Breast Ca in 2018 s/p mastectomy (Dr. Parson, no chemo/RT), hx of Cocaine use (quit in 1999), former smoker (quit in 2015), alcohol abuse disorder (no history of seizures/delirium tremens), COVID-19 10/2022, and cirrhosis with esophageal varices, who we are following for cytopenias from her cirrhosis and metastatic and ER+, HER2-, metastatic breast cancer (bone mets).  2018 - Left breast cancer (Dr. Parson) Opted for bilat mastectomies. States that LN's were neg. Not sure what stage breast cancer. No adjuvant chemo, hormonal tx, no RT. ******** 11/28/22 - Presented to  ER with SOB, cough, abdominal distention, and a 16 lbs weight loss. WBC: 9.62, Hgb: 8.6, Hct: 24.8, MCV: 111.2, Plts: 100, PT: 20.1, PTT: 37.5, INR: 1.72 CTA Chest - negative for PE. Small L pleural effusion. CT A/P - Advanced cirrhosis with extensive esophageal and paraesophageal varices. Enlarged spleen measuring 15.6cm. Moderate volume ascites. Diffuse lytic change and areas of cortical destruction involving the sternum suspicious for metastatic disease. 11/29/22 - Diagnostic paracentesis - consistent with portal HTN and no evidence of SBP. Cytology was negative for malignant cells. EGD - trace esophageal varices, portal gastropathy, gastric polyps - H. Pylori positive. Colonoscopy showed internal hemorrhoids. Started on Lasix 40mg PO and spironolactone 100mg PO for management of ascites. Biopsy of sternum not done due to INR not meeting below 1.5 per IR.  12/1/22 - WBC: 7.42, Hgb: 7.9, Hct: 23.4, MCV: 112.0, Plts: 106. Total bili was 8.1 on admission, 4.6 at discharge. B12: 909, Folate: 12.7, Ferritin: 784, TSAT: 52%, Albumin: 1.8, Haptoglobin: 22, LDH: 217  12/27/22 - Biopsy of sternal mass - metastatic adenocarcinoma c/w breast primary ER positive strong, ND negative HER2 1-2 + FISH negative.  1/17/23 - PET - destructive avid sternal mass. L5 with focal avidity and lucent changes- suspect met. Resolution of L pleural effusion. Marked improvement in ascites. No abnormal FDG uptake in abd pelvis.  1/2023 - Started Letrozole - did not tolerate (severe disabling arthralgias). 4/2023 - Changed to Exemestane. 3/2/23 - Completed RT to sites of oligometastatic disease (sternum and L5) (Dr. Frerera). Plan was to add CDK4/6 inhibitor, but she did not follow up here for almost one year.  2/16/24 - PET/CT - Interval response to therapy with decrease in FDG avidity of previously seen bone lesions. Mild residual activity, for which attention on follow-up is recommended to evaluate for stability. Mildly FDG avid T9 lytic lesion, not evident on CT 5/21/23 or prior PET CT 1/17/23. It is unclear if this is also treated disease or potentially degenerative. Consider interval short-term follow-up to evaluate for stability. Fracture in the posterior elements of L3, along with likely posttraumatic change in a left posterior rib and left posterior elements of L4. FDG uptake in paraspinous muscles. This may be due to recent fall.  3/21/24 - Started Kisqali 400mg D1-21 Q28.  Unable to complete 1st cycle due to cytopenias.  Also unable to complete 2nd cycle due to cytopenias despite dose reduction. Not rechallenged.   [de-identified] : ER positive strong, OH negative HER2 1-2+/FISH negative [de-identified] : No Ca27-29 to follow.  [de-identified] : 6/12/24 - Hgb: 7.3, MCV: 91, Ferritin: 32, TSAT: 5%, ESR: 46 6/21/24 - 7/18/24 - Feraheme x 4 8/20/24 - Hgb: 10.3, MCV: 104.3, Ferritin: 1478, TSAT: 92%, ESR: 48  Patient here in wheelchair today -  in waiting room today.  She reports that since the IV iron she has been able to be more active.  Has not fallen in a while.  Using walker at home.  Last fall was last week of June.   She reports her father will be coming out to stay with her x 3 months.    Still has appointment with Dr. Torres 9/26/24.       Denies any other changes in her family, medical, or social history since her last visit of 6/12/24.

## 2024-09-04 NOTE — PHYSICAL EXAM
[Normal] : affect appropriate [de-identified] : anicteric [de-identified] : no c/c/e [de-identified] : not distended [de-identified] : Can raise legs without problems [de-identified] : pale, macular rash on chest, upper arms

## 2024-09-04 NOTE — REVIEW OF SYSTEMS
[Patient Intake Form Reviewed] : Patient intake form was reviewed [Constipation] : constipation [Muscle Weakness] : muscle weakness [Difficulty Walking] : difficulty walking [Anxiety] : anxiety [Depression] : depression [Negative] : Allergic/Immunologic [Fatigue] : fatigue [FreeTextEntry7] : loss of appetite, diarrhea [FreeTextEntry9] : states hasn't fallen since last visit.  [de-identified] : States always itchy - no change [de-identified] : sleep problems

## 2024-09-20 ENCOUNTER — NON-APPOINTMENT (OUTPATIENT)
Age: 54
End: 2024-09-20

## 2024-09-24 ENCOUNTER — APPOINTMENT (OUTPATIENT)
Dept: NUCLEAR MEDICINE | Facility: CLINIC | Age: 54
End: 2024-09-24
Payer: COMMERCIAL

## 2024-09-24 ENCOUNTER — OUTPATIENT (OUTPATIENT)
Dept: OUTPATIENT SERVICES | Facility: HOSPITAL | Age: 54
LOS: 1 days | End: 2024-09-24

## 2024-09-24 DIAGNOSIS — Z90.13 ACQUIRED ABSENCE OF BILATERAL BREASTS AND NIPPLES: Chronic | ICD-10-CM

## 2024-09-24 DIAGNOSIS — C50.919 MALIGNANT NEOPLASM OF UNSPECIFIED SITE OF UNSPECIFIED FEMALE BREAST: ICD-10-CM

## 2024-09-24 DIAGNOSIS — Z98.891 HISTORY OF UTERINE SCAR FROM PREVIOUS SURGERY: Chronic | ICD-10-CM

## 2024-09-24 PROCEDURE — 78815 PET IMAGE W/CT SKULL-THIGH: CPT | Mod: 26,PS

## 2024-09-25 ENCOUNTER — NON-APPOINTMENT (OUTPATIENT)
Age: 54
End: 2024-09-25

## 2024-09-26 ENCOUNTER — APPOINTMENT (OUTPATIENT)
Dept: GASTROENTEROLOGY | Facility: CLINIC | Age: 54
End: 2024-09-26
Payer: COMMERCIAL

## 2024-09-26 VITALS
HEIGHT: 68 IN | BODY MASS INDEX: 20.16 KG/M2 | OXYGEN SATURATION: 96 % | SYSTOLIC BLOOD PRESSURE: 104 MMHG | DIASTOLIC BLOOD PRESSURE: 65 MMHG | WEIGHT: 133 LBS | HEART RATE: 82 BPM

## 2024-09-26 DIAGNOSIS — C79.51 SECONDARY MALIGNANT NEOPLASM OF BONE: ICD-10-CM

## 2024-09-26 DIAGNOSIS — D50.9 IRON DEFICIENCY ANEMIA, UNSPECIFIED: ICD-10-CM

## 2024-09-26 DIAGNOSIS — I85.00 ESOPHAGEAL VARICES W/OUT BLEEDING: ICD-10-CM

## 2024-09-26 DIAGNOSIS — F32.A DEPRESSION, UNSPECIFIED: ICD-10-CM

## 2024-09-26 DIAGNOSIS — K70.31 ALCOHOLIC CIRRHOSIS OF LIVER WITH ASCITES: ICD-10-CM

## 2024-09-26 DIAGNOSIS — C50.919 MALIGNANT NEOPLASM OF UNSPECIFIED SITE OF UNSPECIFIED FEMALE BREAST: ICD-10-CM

## 2024-09-26 PROCEDURE — 99215 OFFICE O/P EST HI 40 MIN: CPT

## 2024-09-26 RX ORDER — SPIRONOLACTONE 100 MG/1
100 TABLET ORAL DAILY
Qty: 90 | Refills: 2 | Status: ACTIVE | COMMUNITY
Start: 2024-09-26 | End: 1900-01-01

## 2024-09-26 NOTE — ASSESSMENT
[FreeTextEntry1] : IMPRESSION: #   Cirrhosis from alcohol abuse disorder- seen by primary GI Dr. Carolyn Hernandez on 1/2024 and recommended an upper endoscopy for surveillance of esophageal varices.   -  She says hematemesis and was in the ICU in [facility] in 2023 and recalls have EGD and needing a band.   -  EGD by Dr. Damion Joseph on 12/2022 to screen for varices:   Tace varices noted in distal esophagus.  Mild gastric erythema s/p bx.  3 mm gastric body polyp and removed with cold forceps.  Biopsies take of gastric mucosa. Nml duodenum s/p bx.    #  Screening colonoscopy  - Colonoscopy by Dr. Damion Joseph on 12/2022: Quality of prep was good/adequate. Small hemorrhoids. Rpt in 3 yrs.   #  Comorbidities: Hx of breast Ca [in 2018, s/p mastectomy (Dr. Parson, no chemo/RT)], hx of Cocaine use (quit in 1999), former smoker (quit in 2015)    PLAN: I will plan for an upper endoscopy under monitored anesthesia care to rule out Erosive Reflux Disease, Mart's Esophagus, assess integrity of anti-reflux barrier, exclude other diseases such as Eosinophilic Esophagitis, Achalasia, Cancer etc.   Risks, benefits, and alternatives of the procedure were discussed with the patient. Patient understands and agrees to proceed with the planned procedure.  Avoid drinking of ETHOD - higher risk of varies -  Establish with Hepatology - contact information given.

## 2024-09-26 NOTE — HISTORY OF PRESENT ILLNESS
[FreeTextEntry1] : 54 year old woman with a Hx of breast Ca [in 2018, s/p mastectomy (Dr. Parson, no chemo/RT)], hx of Cocaine use (quit in 1999), former smoker (quit in 2015), alcohol abuse disorder (no history of seizures/delirium tremens.), Cirrhosis with esophageal varices, and diagnosed with metastatic breast cancer in 2022 - seen by primary GI Dr. Carolyn Hernandez on 1/2024 and recommended an upper endoscopy for surveillance of esophageal varices.     Tries her hardest to remain sober but has exposure at times.   Patient denies having heartburn, regurgitation, difficulty swallowing, painful swallowing, nausea, vomiting, weight loss, melena and hematochezia.  Patient denies having abdominal pain, constipation, diarrhea.    Has good family support.    All other review of systems are negative.  Denies cardiac symptoms.   Seen Primary GI Dr. Mariely Hernandez on 1/2024:  53 year old woman with a Hx of breast Ca [in 2018, s/p mastectomy (Dr. Parson, no chemo/RT)], hx of Cocaine use (quit in 1999), former smoker (quit in 2015), alcohol abuse disorder (no history of seizures/delirium tremens.), Cirrhosis with esophageal varices, and diagnosed with metastatic breast cancer in 2022 presents with abdominal pain. Pt on Spironolactone 25 mg qid and Furosemide 40 mg/day. Reports eats pickles occasionally. Hemoglobin 8.0 g/dL on January 2023 blood work. Reports regular ETOH use. The pt wishes to enter a rehabilitation program as soon as is possible. Reports does not have regular BMs.

## 2024-09-26 NOTE — PHYSICAL EXAM
[Bowel Sounds] : normal bowel sounds [Abdomen Tenderness] : non-tender [No Masses] : no abdominal mass palpated [Abdomen Soft] : soft [] : no hepatosplenomegaly [Cervical Lymph Nodes Enlarged Posterior Bilaterally] : no posterior cervical lymphadenopathy [Supraclavicular Lymph Nodes Enlarged Bilaterally] : no supraclavicular lymphadenopathy [Abnormal Walk] : normal gait [No Clubbing, Cyanosis] : no clubbing or cyanosis of the fingernails [Normal Color / Pigmentation] : normal skin color and pigmentation [Normal] : oriented to person, place, and time

## 2024-10-10 ENCOUNTER — RESULT REVIEW (OUTPATIENT)
Age: 54
End: 2024-10-10

## 2024-10-10 ENCOUNTER — APPOINTMENT (OUTPATIENT)
Dept: GASTROENTEROLOGY | Facility: HOSPITAL | Age: 54
End: 2024-10-10

## 2024-10-10 ENCOUNTER — OUTPATIENT (OUTPATIENT)
Dept: OUTPATIENT SERVICES | Facility: HOSPITAL | Age: 54
LOS: 1 days | End: 2024-10-10
Payer: COMMERCIAL

## 2024-10-10 DIAGNOSIS — Z98.891 HISTORY OF UTERINE SCAR FROM PREVIOUS SURGERY: Chronic | ICD-10-CM

## 2024-10-10 DIAGNOSIS — I85.00 ESOPHAGEAL VARICES WITHOUT BLEEDING: ICD-10-CM

## 2024-10-10 DIAGNOSIS — Z90.13 ACQUIRED ABSENCE OF BILATERAL BREASTS AND NIPPLES: Chronic | ICD-10-CM

## 2024-10-10 PROCEDURE — 88305 TISSUE EXAM BY PATHOLOGIST: CPT | Mod: 26

## 2024-10-10 PROCEDURE — 88342 IMHCHEM/IMCYTCHM 1ST ANTB: CPT

## 2024-10-10 PROCEDURE — 88342 IMHCHEM/IMCYTCHM 1ST ANTB: CPT | Mod: 26

## 2024-10-10 PROCEDURE — 88312 SPECIAL STAINS GROUP 1: CPT | Mod: 26

## 2024-10-10 PROCEDURE — 88305 TISSUE EXAM BY PATHOLOGIST: CPT

## 2024-10-10 PROCEDURE — 43239 EGD BIOPSY SINGLE/MULTIPLE: CPT

## 2024-10-10 PROCEDURE — 88312 SPECIAL STAINS GROUP 1: CPT

## 2024-10-10 DEVICE — HEMOSPRAY HEMOSTAT ENDO 7F: Type: IMPLANTABLE DEVICE | Status: FUNCTIONAL

## 2024-10-10 DEVICE — ESOPHAGEAL BALLOON CATH CRE FIXED WIRE 6-7-8MM: Type: IMPLANTABLE DEVICE | Status: FUNCTIONAL

## 2024-10-11 LAB — SURGICAL PATHOLOGY STUDY: SIGNIFICANT CHANGE UP

## 2024-10-15 ENCOUNTER — OUTPATIENT (OUTPATIENT)
Dept: OUTPATIENT SERVICES | Facility: HOSPITAL | Age: 54
LOS: 1 days | Discharge: HOME | End: 2024-10-15

## 2024-10-15 DIAGNOSIS — C79.51 SECONDARY MALIGNANT NEOPLASM OF BONE: ICD-10-CM

## 2024-10-15 DIAGNOSIS — D50.9 IRON DEFICIENCY ANEMIA, UNSPECIFIED: ICD-10-CM

## 2024-10-15 DIAGNOSIS — D69.9 HEMORRHAGIC CONDITION, UNSPECIFIED: ICD-10-CM

## 2024-10-15 DIAGNOSIS — Z90.13 ACQUIRED ABSENCE OF BILATERAL BREASTS AND NIPPLES: Chronic | ICD-10-CM

## 2024-10-15 DIAGNOSIS — D64.9 ANEMIA, UNSPECIFIED: ICD-10-CM

## 2024-10-15 DIAGNOSIS — C50.919 MALIGNANT NEOPLASM OF UNSPECIFIED SITE OF UNSPECIFIED FEMALE BREAST: ICD-10-CM

## 2024-10-15 DIAGNOSIS — Z98.891 HISTORY OF UTERINE SCAR FROM PREVIOUS SURGERY: Chronic | ICD-10-CM

## 2024-10-16 ENCOUNTER — APPOINTMENT (OUTPATIENT)
Dept: HEMATOLOGY ONCOLOGY | Facility: CLINIC | Age: 54
End: 2024-10-16
Payer: COMMERCIAL

## 2024-10-16 VITALS
SYSTOLIC BLOOD PRESSURE: 106 MMHG | DIASTOLIC BLOOD PRESSURE: 70 MMHG | HEIGHT: 68 IN | WEIGHT: 136.25 LBS | BODY MASS INDEX: 20.65 KG/M2 | HEART RATE: 108 BPM | OXYGEN SATURATION: 95 % | TEMPERATURE: 98.4 F

## 2024-10-16 PROCEDURE — 99215 OFFICE O/P EST HI 40 MIN: CPT

## 2024-10-25 ENCOUNTER — APPOINTMENT (OUTPATIENT)
Dept: GASTROENTEROLOGY | Facility: CLINIC | Age: 54
End: 2024-10-25
Payer: COMMERCIAL

## 2024-10-25 VITALS
SYSTOLIC BLOOD PRESSURE: 118 MMHG | OXYGEN SATURATION: 95 % | HEIGHT: 68 IN | HEART RATE: 105 BPM | WEIGHT: 138.13 LBS | DIASTOLIC BLOOD PRESSURE: 72 MMHG | BODY MASS INDEX: 20.93 KG/M2

## 2024-10-25 DIAGNOSIS — D50.9 IRON DEFICIENCY ANEMIA, UNSPECIFIED: ICD-10-CM

## 2024-10-25 DIAGNOSIS — C79.51 SECONDARY MALIGNANT NEOPLASM OF BONE: ICD-10-CM

## 2024-10-25 DIAGNOSIS — I85.00 ESOPHAGEAL VARICES W/OUT BLEEDING: ICD-10-CM

## 2024-10-25 DIAGNOSIS — F32.A DEPRESSION, UNSPECIFIED: ICD-10-CM

## 2024-10-25 DIAGNOSIS — D64.9 ANEMIA, UNSPECIFIED: ICD-10-CM

## 2024-10-25 PROCEDURE — 99214 OFFICE O/P EST MOD 30 MIN: CPT

## 2024-10-29 ENCOUNTER — APPOINTMENT (OUTPATIENT)
Dept: INTERNAL MEDICINE | Facility: CLINIC | Age: 54
End: 2024-10-29
Payer: COMMERCIAL

## 2024-10-29 VITALS
DIASTOLIC BLOOD PRESSURE: 68 MMHG | TEMPERATURE: 99.1 F | OXYGEN SATURATION: 97 % | HEIGHT: 68 IN | BODY MASS INDEX: 20.93 KG/M2 | WEIGHT: 138.13 LBS | RESPIRATION RATE: 16 BRPM | SYSTOLIC BLOOD PRESSURE: 100 MMHG | HEART RATE: 94 BPM

## 2024-10-29 DIAGNOSIS — K70.31 ALCOHOLIC CIRRHOSIS OF LIVER WITH ASCITES: ICD-10-CM

## 2024-10-29 DIAGNOSIS — F32.A ANXIETY DISORDER, UNSPECIFIED: ICD-10-CM

## 2024-10-29 DIAGNOSIS — C50.919 MALIGNANT NEOPLASM OF UNSPECIFIED SITE OF UNSPECIFIED FEMALE BREAST: ICD-10-CM

## 2024-10-29 DIAGNOSIS — F41.9 ANXIETY DISORDER, UNSPECIFIED: ICD-10-CM

## 2024-10-29 PROCEDURE — 99215 OFFICE O/P EST HI 40 MIN: CPT

## 2024-10-29 RX ORDER — HYDROXYZINE HYDROCHLORIDE 50 MG/1
50 TABLET ORAL
Qty: 90 | Refills: 0 | Status: ACTIVE | COMMUNITY
Start: 2024-10-29 | End: 1900-01-01

## 2024-10-29 RX ORDER — VENLAFAXINE HYDROCHLORIDE 37.5 MG/1
37.5 CAPSULE, EXTENDED RELEASE ORAL DAILY
Qty: 90 | Refills: 3 | Status: ACTIVE | COMMUNITY
Start: 2024-10-29 | End: 1900-01-01

## 2024-11-06 ENCOUNTER — APPOINTMENT (OUTPATIENT)
Dept: MRI IMAGING | Facility: CLINIC | Age: 54
End: 2024-11-06
Payer: COMMERCIAL

## 2024-11-06 ENCOUNTER — OUTPATIENT (OUTPATIENT)
Dept: OUTPATIENT SERVICES | Facility: HOSPITAL | Age: 54
LOS: 1 days | End: 2024-11-06
Payer: COMMERCIAL

## 2024-11-06 DIAGNOSIS — Z90.13 ACQUIRED ABSENCE OF BILATERAL BREASTS AND NIPPLES: Chronic | ICD-10-CM

## 2024-11-06 DIAGNOSIS — Z98.891 HISTORY OF UTERINE SCAR FROM PREVIOUS SURGERY: Chronic | ICD-10-CM

## 2024-11-06 DIAGNOSIS — C50.919 MALIGNANT NEOPLASM OF UNSPECIFIED SITE OF UNSPECIFIED FEMALE BREAST: ICD-10-CM

## 2024-11-06 PROCEDURE — A9585: CPT

## 2024-11-06 PROCEDURE — 70553 MRI BRAIN STEM W/O & W/DYE: CPT

## 2024-11-06 PROCEDURE — 70553 MRI BRAIN STEM W/O & W/DYE: CPT | Mod: 26

## 2024-11-11 RX ORDER — BUSPIRONE HYDROCHLORIDE 7.5 MG/1
7.5 TABLET ORAL
Qty: 60 | Refills: 0 | Status: ACTIVE | COMMUNITY
Start: 2024-11-11 | End: 1900-01-01

## 2024-12-03 ENCOUNTER — NON-APPOINTMENT (OUTPATIENT)
Age: 54
End: 2024-12-03

## 2024-12-03 ENCOUNTER — APPOINTMENT (OUTPATIENT)
Dept: INTERNAL MEDICINE | Facility: CLINIC | Age: 54
End: 2024-12-03
Payer: COMMERCIAL

## 2024-12-03 DIAGNOSIS — F41.9 ANXIETY DISORDER, UNSPECIFIED: ICD-10-CM

## 2024-12-03 DIAGNOSIS — F32.A ANXIETY DISORDER, UNSPECIFIED: ICD-10-CM

## 2024-12-03 PROCEDURE — 99442: CPT

## 2024-12-20 ENCOUNTER — OUTPATIENT (OUTPATIENT)
Dept: OUTPATIENT SERVICES | Facility: HOSPITAL | Age: 54
LOS: 1 days | Discharge: HOME | End: 2024-12-20

## 2024-12-20 DIAGNOSIS — Z90.13 ACQUIRED ABSENCE OF BILATERAL BREASTS AND NIPPLES: Chronic | ICD-10-CM

## 2024-12-20 DIAGNOSIS — D64.9 ANEMIA, UNSPECIFIED: ICD-10-CM

## 2024-12-20 DIAGNOSIS — C79.51 SECONDARY MALIGNANT NEOPLASM OF BONE: ICD-10-CM

## 2024-12-20 DIAGNOSIS — D69.6 THROMBOCYTOPENIA, UNSPECIFIED: ICD-10-CM

## 2024-12-20 DIAGNOSIS — Z98.891 HISTORY OF UTERINE SCAR FROM PREVIOUS SURGERY: Chronic | ICD-10-CM

## 2024-12-20 DIAGNOSIS — D50.9 IRON DEFICIENCY ANEMIA, UNSPECIFIED: ICD-10-CM

## 2024-12-27 ENCOUNTER — APPOINTMENT (OUTPATIENT)
Dept: GASTROENTEROLOGY | Facility: CLINIC | Age: 54
End: 2024-12-27

## 2024-12-31 ENCOUNTER — APPOINTMENT (OUTPATIENT)
Dept: HEPATOLOGY | Facility: CLINIC | Age: 54
End: 2024-12-31
Payer: COMMERCIAL

## 2024-12-31 VITALS
WEIGHT: 131 LBS | DIASTOLIC BLOOD PRESSURE: 64 MMHG | TEMPERATURE: 98.3 F | HEIGHT: 68 IN | OXYGEN SATURATION: 100 % | HEART RATE: 86 BPM | BODY MASS INDEX: 19.85 KG/M2 | RESPIRATION RATE: 16 BRPM | SYSTOLIC BLOOD PRESSURE: 96 MMHG

## 2024-12-31 DIAGNOSIS — I85.00 ESOPHAGEAL VARICES W/OUT BLEEDING: ICD-10-CM

## 2024-12-31 DIAGNOSIS — K76.6 PORTAL HYPERTENSION: ICD-10-CM

## 2024-12-31 DIAGNOSIS — K70.31 ALCOHOLIC CIRRHOSIS OF LIVER WITH ASCITES: ICD-10-CM

## 2024-12-31 PROCEDURE — 99204 OFFICE O/P NEW MOD 45 MIN: CPT

## 2025-01-03 ENCOUNTER — NON-APPOINTMENT (OUTPATIENT)
Age: 55
End: 2025-01-03

## 2025-01-09 ENCOUNTER — OUTPATIENT (OUTPATIENT)
Dept: OUTPATIENT SERVICES | Facility: HOSPITAL | Age: 55
LOS: 1 days | End: 2025-01-09
Payer: COMMERCIAL

## 2025-01-09 ENCOUNTER — APPOINTMENT (OUTPATIENT)
Dept: ULTRASOUND IMAGING | Facility: CLINIC | Age: 55
End: 2025-01-09
Payer: COMMERCIAL

## 2025-01-09 DIAGNOSIS — K70.31 ALCOHOLIC CIRRHOSIS OF LIVER WITH ASCITES: ICD-10-CM

## 2025-01-09 DIAGNOSIS — Z00.8 ENCOUNTER FOR OTHER GENERAL EXAMINATION: ICD-10-CM

## 2025-01-09 DIAGNOSIS — Z90.13 ACQUIRED ABSENCE OF BILATERAL BREASTS AND NIPPLES: Chronic | ICD-10-CM

## 2025-01-09 DIAGNOSIS — Z98.891 HISTORY OF UTERINE SCAR FROM PREVIOUS SURGERY: Chronic | ICD-10-CM

## 2025-01-09 PROCEDURE — 76700 US EXAM ABDOM COMPLETE: CPT

## 2025-01-09 PROCEDURE — 76700 US EXAM ABDOM COMPLETE: CPT | Mod: 26

## 2025-01-15 ENCOUNTER — APPOINTMENT (OUTPATIENT)
Dept: HEMATOLOGY ONCOLOGY | Facility: CLINIC | Age: 55
End: 2025-01-15
Payer: COMMERCIAL

## 2025-01-15 DIAGNOSIS — D50.9 IRON DEFICIENCY ANEMIA, UNSPECIFIED: ICD-10-CM

## 2025-01-15 DIAGNOSIS — K70.31 ALCOHOLIC CIRRHOSIS OF LIVER WITH ASCITES: ICD-10-CM

## 2025-01-15 DIAGNOSIS — C50.919 MALIGNANT NEOPLASM OF UNSPECIFIED SITE OF UNSPECIFIED FEMALE BREAST: ICD-10-CM

## 2025-01-15 PROCEDURE — 99212 OFFICE O/P EST SF 10 MIN: CPT

## 2025-01-16 ENCOUNTER — APPOINTMENT (OUTPATIENT)
Dept: INTERNAL MEDICINE | Facility: CLINIC | Age: 55
End: 2025-01-16
Payer: COMMERCIAL

## 2025-01-16 VITALS
HEIGHT: 68 IN | RESPIRATION RATE: 15 BRPM | HEART RATE: 105 BPM | TEMPERATURE: 98.3 F | DIASTOLIC BLOOD PRESSURE: 80 MMHG | OXYGEN SATURATION: 96 % | SYSTOLIC BLOOD PRESSURE: 110 MMHG

## 2025-01-16 DIAGNOSIS — F41.9 ANXIETY DISORDER, UNSPECIFIED: ICD-10-CM

## 2025-01-16 DIAGNOSIS — C79.51 SECONDARY MALIGNANT NEOPLASM OF BONE: ICD-10-CM

## 2025-01-16 DIAGNOSIS — F32.A ANXIETY DISORDER, UNSPECIFIED: ICD-10-CM

## 2025-01-16 DIAGNOSIS — F10.21 ALCOHOL DEPENDENCE, IN REMISSION: ICD-10-CM

## 2025-01-16 DIAGNOSIS — Z02.83 ENCOUNTER FOR BLOOD-ALCOHOL AND BLOOD-DRUG TEST: ICD-10-CM

## 2025-01-16 DIAGNOSIS — F32.A DEPRESSION, UNSPECIFIED: ICD-10-CM

## 2025-01-16 PROCEDURE — 99215 OFFICE O/P EST HI 40 MIN: CPT

## 2025-01-17 PROBLEM — Z02.83 ENCOUNTER FOR DRUG SCREENING: Status: ACTIVE | Noted: 2025-01-17

## 2025-01-28 ENCOUNTER — APPOINTMENT (OUTPATIENT)
Dept: HEPATOLOGY | Facility: CLINIC | Age: 55
End: 2025-01-28

## 2025-03-06 ENCOUNTER — APPOINTMENT (OUTPATIENT)
Dept: HEPATOLOGY | Facility: CLINIC | Age: 55
End: 2025-03-06

## 2025-03-14 ENCOUNTER — OUTPATIENT (OUTPATIENT)
Dept: OUTPATIENT SERVICES | Facility: HOSPITAL | Age: 55
LOS: 1 days | End: 2025-03-14
Payer: COMMERCIAL

## 2025-03-14 ENCOUNTER — APPOINTMENT (OUTPATIENT)
Dept: NUCLEAR MEDICINE | Facility: CLINIC | Age: 55
End: 2025-03-14

## 2025-03-14 DIAGNOSIS — Z90.13 ACQUIRED ABSENCE OF BILATERAL BREASTS AND NIPPLES: Chronic | ICD-10-CM

## 2025-03-14 DIAGNOSIS — Z98.891 HISTORY OF UTERINE SCAR FROM PREVIOUS SURGERY: Chronic | ICD-10-CM

## 2025-03-14 PROCEDURE — 78815 PET IMAGE W/CT SKULL-THIGH: CPT

## 2025-03-14 PROCEDURE — A9552: CPT

## 2025-03-14 PROCEDURE — 78815 PET IMAGE W/CT SKULL-THIGH: CPT | Mod: 26,PS

## 2025-03-27 ENCOUNTER — OUTPATIENT (OUTPATIENT)
Dept: OUTPATIENT SERVICES | Facility: HOSPITAL | Age: 55
LOS: 1 days | Discharge: HOME | End: 2025-03-27

## 2025-03-27 DIAGNOSIS — D64.9 ANEMIA, UNSPECIFIED: ICD-10-CM

## 2025-03-27 DIAGNOSIS — Z98.891 HISTORY OF UTERINE SCAR FROM PREVIOUS SURGERY: Chronic | ICD-10-CM

## 2025-03-27 DIAGNOSIS — D50.9 IRON DEFICIENCY ANEMIA, UNSPECIFIED: ICD-10-CM

## 2025-03-27 DIAGNOSIS — C50.919 MALIGNANT NEOPLASM OF UNSPECIFIED SITE OF UNSPECIFIED FEMALE BREAST: ICD-10-CM

## 2025-03-27 DIAGNOSIS — Z90.13 ACQUIRED ABSENCE OF BILATERAL BREASTS AND NIPPLES: Chronic | ICD-10-CM

## 2025-03-27 DIAGNOSIS — D69.6 THROMBOCYTOPENIA, UNSPECIFIED: ICD-10-CM

## 2025-03-27 DIAGNOSIS — C79.51 SECONDARY MALIGNANT NEOPLASM OF BONE: ICD-10-CM

## 2025-03-31 ENCOUNTER — APPOINTMENT (OUTPATIENT)
Dept: HEMATOLOGY ONCOLOGY | Facility: CLINIC | Age: 55
End: 2025-03-31

## 2025-04-23 ENCOUNTER — NON-APPOINTMENT (OUTPATIENT)
Age: 55
End: 2025-04-23

## 2025-05-16 ENCOUNTER — RX RENEWAL (OUTPATIENT)
Age: 55
End: 2025-05-16

## 2025-05-19 NOTE — ED ADULT NURSE NOTE - NSHOSCREENINGQ1_ED_ALL_ED
[FreeTextEntry1] : Patient Name: KIRSTIN HALL : 1963 Date: 2025 Attending: Dr. Oren Lerman   HPI: follow up appointment for left breast tissue expander to implant exchange    Allergies: aspirin, salicylates, nickel, seafood, nuts, egg, dairy Medication prescribed: oxycodone 5 mg, ibuprofen 600 mg   ROS: complete 14 point review of systems negative except pertinent items reviewed in the HPI. Other non-contributory items reviewed in our new patient questionnaire and we have submitted it to be scanned into the medical record.   Physical Exam: completed at time of in office evaluation   Assessment/Plan: We have discussed pre and postop instructions, recovery limitations, restrictions and expectations, ERAS protocol, NPO status, transportation home, postop medications, NATALIE drains, compression garments, benefits and risks of the procedure. Pre-op labs reviewed. The patient would like to proceed with surgery as scheduled.   AYAKA KAUR NP  No

## 2025-05-27 ENCOUNTER — LABORATORY RESULT (OUTPATIENT)
Age: 55
End: 2025-05-27

## 2025-05-27 ENCOUNTER — EMERGENCY (EMERGENCY)
Facility: HOSPITAL | Age: 55
LOS: 0 days | Discharge: HOME | End: 2025-05-28
Attending: EMERGENCY MEDICINE
Payer: COMMERCIAL

## 2025-05-27 ENCOUNTER — NON-APPOINTMENT (OUTPATIENT)
Age: 55
End: 2025-05-27

## 2025-05-27 VITALS — WEIGHT: 108.91 LBS | HEIGHT: 62 IN

## 2025-05-27 DIAGNOSIS — Z98.891 HISTORY OF UTERINE SCAR FROM PREVIOUS SURGERY: Chronic | ICD-10-CM

## 2025-05-27 DIAGNOSIS — R60.9 EDEMA, UNSPECIFIED: ICD-10-CM

## 2025-05-27 DIAGNOSIS — E87.6 HYPOKALEMIA: ICD-10-CM

## 2025-05-27 DIAGNOSIS — D64.9 ANEMIA, UNSPECIFIED: ICD-10-CM

## 2025-05-27 DIAGNOSIS — R53.1 WEAKNESS: ICD-10-CM

## 2025-05-27 DIAGNOSIS — Z90.13 ACQUIRED ABSENCE OF BILATERAL BREASTS AND NIPPLES: Chronic | ICD-10-CM

## 2025-05-27 LAB
ALBUMIN SERPL ELPH-MCNC: 2 G/DL — LOW (ref 3.3–5)
ALP SERPL-CCNC: 86 U/L — SIGNIFICANT CHANGE UP (ref 40–120)
ALT FLD-CCNC: 10 U/L — LOW (ref 12–78)
ANION GAP SERPL CALC-SCNC: 13 MMOL/L — SIGNIFICANT CHANGE UP (ref 5–17)
APTT BLD: 33.1 SEC — SIGNIFICANT CHANGE UP (ref 26.1–36.8)
AST SERPL-CCNC: 43 U/L — HIGH (ref 15–37)
BASOPHILS # BLD AUTO: 0.04 K/UL — SIGNIFICANT CHANGE UP (ref 0–0.2)
BASOPHILS NFR BLD AUTO: 0.4 % — SIGNIFICANT CHANGE UP (ref 0–2)
BILIRUB SERPL-MCNC: 4.7 MG/DL — HIGH (ref 0.2–1.2)
BLD GP AB SCN SERPL QL: SIGNIFICANT CHANGE UP
BUN SERPL-MCNC: 13 MG/DL — SIGNIFICANT CHANGE UP (ref 7–23)
CALCIUM SERPL-MCNC: 7 MG/DL — LOW (ref 8.5–10.1)
CHLORIDE SERPL-SCNC: 88 MMOL/L — LOW (ref 96–108)
CO2 SERPL-SCNC: 26 MMOL/L — SIGNIFICANT CHANGE UP (ref 22–31)
CREAT SERPL-MCNC: 0.9 MG/DL — SIGNIFICANT CHANGE UP (ref 0.5–1.3)
EGFR: 77 ML/MIN/1.73M2 — SIGNIFICANT CHANGE UP
EGFR: 77 ML/MIN/1.73M2 — SIGNIFICANT CHANGE UP
EOSINOPHIL # BLD AUTO: 0.11 K/UL — SIGNIFICANT CHANGE UP (ref 0–0.5)
EOSINOPHIL NFR BLD AUTO: 1.1 % — SIGNIFICANT CHANGE UP (ref 0–6)
GLUCOSE SERPL-MCNC: 124 MG/DL — HIGH (ref 70–99)
HCG SERPL-ACNC: <1 MIU/ML — SIGNIFICANT CHANGE UP
HCT VFR BLD CALC: 18.2 % — CRITICAL LOW (ref 34.5–45)
HGB BLD-MCNC: 5.8 G/DL — CRITICAL LOW (ref 11.5–15.5)
IMM GRANULOCYTES # BLD AUTO: 0.04 K/UL — SIGNIFICANT CHANGE UP (ref 0–0.07)
IMM GRANULOCYTES NFR BLD AUTO: 0.4 % — SIGNIFICANT CHANGE UP (ref 0–0.9)
IMMATURE PLATELET FRACTION #: 2 K/UL — LOW (ref 4.7–11.1)
IMMATURE PLATELET FRACTION %: 2.3 % — SIGNIFICANT CHANGE UP (ref 1.6–4.9)
INR BLD: 2.16 RATIO — HIGH (ref 0.85–1.16)
LYMPHOCYTES # BLD AUTO: 1.43 K/UL — SIGNIFICANT CHANGE UP (ref 1–3.3)
LYMPHOCYTES NFR BLD AUTO: 14.7 % — SIGNIFICANT CHANGE UP (ref 13–44)
MCHC RBC-ENTMCNC: 31.9 G/DL — LOW (ref 32–36)
MCHC RBC-ENTMCNC: 32.6 PG — SIGNIFICANT CHANGE UP (ref 27–34)
MCV RBC AUTO: 102.2 FL — HIGH (ref 80–100)
MONOCYTES # BLD AUTO: 0.89 K/UL — SIGNIFICANT CHANGE UP (ref 0–0.9)
MONOCYTES NFR BLD AUTO: 9.2 % — SIGNIFICANT CHANGE UP (ref 2–14)
NEUTROPHILS # BLD AUTO: 7.21 K/UL — SIGNIFICANT CHANGE UP (ref 1.8–7.4)
NEUTROPHILS NFR BLD AUTO: 74.2 % — SIGNIFICANT CHANGE UP (ref 43–77)
NRBC # BLD AUTO: 0 K/UL — SIGNIFICANT CHANGE UP (ref 0–0)
NRBC # FLD: 0 K/UL — SIGNIFICANT CHANGE UP (ref 0–0)
NRBC BLD AUTO-RTO: 0 /100 WBCS — SIGNIFICANT CHANGE UP (ref 0–0)
PLATELET # BLD AUTO: 88 K/UL — LOW (ref 150–400)
PMV BLD: 9.7 FL — SIGNIFICANT CHANGE UP (ref 7–13)
POTASSIUM SERPL-MCNC: 2.2 MMOL/L — CRITICAL LOW (ref 3.5–5.3)
POTASSIUM SERPL-SCNC: 2.2 MMOL/L — CRITICAL LOW (ref 3.5–5.3)
PROT SERPL-MCNC: 6.4 GM/DL — SIGNIFICANT CHANGE UP (ref 6–8.3)
PROTHROM AB SERPL-ACNC: 24.7 SEC — HIGH (ref 9.9–13.4)
RBC # BLD: 1.78 M/UL — LOW (ref 3.8–5.2)
RBC # FLD: 18.9 % — HIGH (ref 10.3–14.5)
SODIUM SERPL-SCNC: 127 MMOL/L — LOW (ref 135–145)
WBC # BLD: 9.72 K/UL — SIGNIFICANT CHANGE UP (ref 3.8–10.5)
WBC # FLD AUTO: 9.72 K/UL — SIGNIFICANT CHANGE UP (ref 3.8–10.5)

## 2025-05-27 PROCEDURE — 85610 PROTHROMBIN TIME: CPT

## 2025-05-27 PROCEDURE — 85730 THROMBOPLASTIN TIME PARTIAL: CPT

## 2025-05-27 PROCEDURE — 96376 TX/PRO/DX INJ SAME DRUG ADON: CPT

## 2025-05-27 PROCEDURE — 86923 COMPATIBILITY TEST ELECTRIC: CPT

## 2025-05-27 PROCEDURE — 96374 THER/PROPH/DIAG INJ IV PUSH: CPT

## 2025-05-27 PROCEDURE — 85025 COMPLETE CBC W/AUTO DIFF WBC: CPT

## 2025-05-27 PROCEDURE — 86850 RBC ANTIBODY SCREEN: CPT

## 2025-05-27 PROCEDURE — 99285 EMERGENCY DEPT VISIT HI MDM: CPT

## 2025-05-27 PROCEDURE — 36430 TRANSFUSION BLD/BLD COMPNT: CPT

## 2025-05-27 PROCEDURE — 99285 EMERGENCY DEPT VISIT HI MDM: CPT | Mod: 25

## 2025-05-27 PROCEDURE — 93005 ELECTROCARDIOGRAM TRACING: CPT

## 2025-05-27 PROCEDURE — 84702 CHORIONIC GONADOTROPIN TEST: CPT

## 2025-05-27 PROCEDURE — 86901 BLOOD TYPING SEROLOGIC RH(D): CPT

## 2025-05-27 PROCEDURE — 80053 COMPREHEN METABOLIC PANEL: CPT

## 2025-05-27 PROCEDURE — 36415 COLL VENOUS BLD VENIPUNCTURE: CPT

## 2025-05-27 PROCEDURE — P9040: CPT

## 2025-05-27 PROCEDURE — 86900 BLOOD TYPING SEROLOGIC ABO: CPT

## 2025-05-27 PROCEDURE — 93010 ELECTROCARDIOGRAM REPORT: CPT

## 2025-05-27 RX ORDER — ONDANSETRON HCL/PF 4 MG/2 ML
4 VIAL (ML) INJECTION ONCE
Refills: 0 | Status: COMPLETED | OUTPATIENT
Start: 2025-05-27 | End: 2025-05-27

## 2025-05-27 RX ADMIN — Medication 100 MILLIEQUIVALENT(S): at 18:41

## 2025-05-27 RX ADMIN — Medication 40 MILLIEQUIVALENT(S): at 18:40

## 2025-05-27 RX ADMIN — Medication 100 MILLIEQUIVALENT(S): at 23:10

## 2025-05-27 RX ADMIN — Medication 40 MILLIEQUIVALENT(S): at 21:21

## 2025-05-27 RX ADMIN — Medication 100 MILLIEQUIVALENT(S): at 21:27

## 2025-05-27 NOTE — ED PROVIDER NOTE - CARE PLAN
1 Principal Discharge DX:	Anemia   Principal Discharge DX:	Anemia  Secondary Diagnosis:	Hypokalemia

## 2025-05-27 NOTE — ED PROVIDER NOTE - PATIENT PORTAL LINK FT
You can access the FollowMyHealth Patient Portal offered by [facility] by registering at the following website: [email or web address]. By joining [employer]’s FollowMyHealth portal, you will also be able to view your health information using other applications (apps) compatible with our system.

## 2025-05-27 NOTE — ED PROVIDER NOTE - PHYSICAL EXAMINATION
awake and alert  perrl, eomi  cta b/l, [id number]  abd soft, non tender  trace pedal edema b/l  no rash/redness  no calf tenderness

## 2025-05-27 NOTE — ED PROVIDER NOTE - CLINICAL SUMMARY MEDICAL DECISION MAKING FREE TEXT BOX
54-year-old female with history of stage IV metastatic cancer sent in by oncologist for anemia.  Patient denies any known bleeding.  Patient reports similar episodes in the past for which she is required transfusion.  Patient states she is not staying in the hospital.  Will order 2 units of blood.  Patient without any history of heart failure.  So we will order over 2 units each.  And patient likely to be discharged after transfusion assuming no reaction or no other issues 54-year-old female with history of stage IV metastatic cancer sent in by oncologist for anemia.  Patient denies any known bleeding.  Patient reports similar episodes in the past for which she is required transfusion.  Patient states she is not staying in the hospital.  Will order 2 units of blood.  Patient without any history of heart failure.  So we will order over 2 units each.  And patient likely to be discharged after transfusion assuming no reaction or no other issues    K 2.2 , will replete    I recommended admission, but patient refuses; will transfuse 2 units and replete K, patient wants to be discharged after

## 2025-05-27 NOTE — ED PROVIDER NOTE - NSFOLLOWUPINSTRUCTIONS_ED_ALL_ED_FT
Anemia    WHAT YOU NEED TO KNOW:    Anemia is a low number of red blood cells or a low amount of hemoglobin in your red blood cells. Hemoglobin is a protein that helps carry oxygen throughout your body. Red blood cells use iron to create hemoglobin. Anemia may develop if your body does not have enough iron. It may also develop if your body does not make enough red blood cells or they die faster than your body can make them.     DISCHARGE INSTRUCTIONS:    Call 911 or have someone call 911 for any of the following:     You lose consciousness.      You have severe chest pain.    Return to the emergency department if:     You have dark or bloody bowel movements.        Contact your healthcare provider if:     Your symptoms are worse, even after treatment.      You have questions or concerns about your condition or care.    Medicines:     Iron or folic acid supplements help increase your red blood cell and hemoglobin levels.       Vitamin B12 injections may help boost your red blood cell level and decrease your symptoms. Ask your healthcare provider how to inject B12.      Take your medicine as directed. Contact your healthcare provider if you think your medicine is not helping or if you have side effects. Tell him of her if you are allergic to any medicine. Keep a list of the medicines, vitamins, and herbs you take. Include the amounts, and when and why you take them. Bring the list or the pill bottles to follow-up visits. Carry your medicine list with you in case of an emergency.    Prevent anemia: Eat healthy foods rich in iron and vitamin C. Nuts, meat, dark leafy green vegetables, and beans are high in iron and protein. Vitamin C helps your body absorb iron. Foods rich in vitamin C include oranges and other citrus fruits. Ask your healthcare provider for a list of other foods that are high in iron or vitamin C. Ask if you need to be on a special diet.     Follow up with your healthcare provider as directed: Write down your questions so you remember to ask them during your visits. Follow up with your doctor within 1-2 days.    Anemia    WHAT YOU NEED TO KNOW:    Anemia is a low number of red blood cells or a low amount of hemoglobin in your red blood cells. Hemoglobin is a protein that helps carry oxygen throughout your body. Red blood cells use iron to create hemoglobin. Anemia may develop if your body does not have enough iron. It may also develop if your body does not make enough red blood cells or they die faster than your body can make them.     DISCHARGE INSTRUCTIONS:    Call 911 or have someone call 911 for any of the following:     You lose consciousness.      You have severe chest pain.    Return to the emergency department if:     You have dark or bloody bowel movements.        Contact your healthcare provider if:     Your symptoms are worse, even after treatment.      You have questions or concerns about your condition or care.    Medicines:     Iron or folic acid supplements help increase your red blood cell and hemoglobin levels.       Vitamin B12 injections may help boost your red blood cell level and decrease your symptoms. Ask your healthcare provider how to inject B12.      Take your medicine as directed. Contact your healthcare provider if you think your medicine is not helping or if you have side effects. Tell him of her if you are allergic to any medicine. Keep a list of the medicines, vitamins, and herbs you take. Include the amounts, and when and why you take them. Bring the list or the pill bottles to follow-up visits. Carry your medicine list with you in case of an emergency.    Prevent anemia: Eat healthy foods rich in iron and vitamin C. Nuts, meat, dark leafy green vegetables, and beans are high in iron and protein. Vitamin C helps your body absorb iron. Foods rich in vitamin C include oranges and other citrus fruits. Ask your healthcare provider for a list of other foods that are high in iron or vitamin C. Ask if you need to be on a special diet.     Follow up with your healthcare provider as directed: Write down your questions so you remember to ask them during your visits.

## 2025-05-27 NOTE — ED ADULT NURSE NOTE - NSSEPSISNEWALTERMENTAL_ED_A_ED
No Pain Service Follow-up  Postop Day  1    S/P  C- Section    T(C): 36.7 (09-19-17 @ 05:56), Max: 37.1 (09-19-17 @ 01:51)  HR: 69 (09-19-17 @ 05:56) (58 - 69)  BP: 104/58 (09-19-17 @ 05:56) (97/72 - 132/74)  RR: 18 (09-19-17 @ 05:56) (17 - 24)  SpO2: 98% (09-19-17 @ 05:56) (96% - 99%)  Wt(kg): --      THERAPY:  [X] Spinal morphine   [  ] Epidural morphine   [  ] IV PCA Hydromorphone 1 mg/ml    Sedation Score:	  [X] Alert	      [  ] Drowsy       [  ] Arousable	[  ] Asleep         [  ] Unresponsive    Side Effects:	  [X] None	      [  ] Nausea       [  ] Pruritus        [  ] Weakness   [  ] Numbness        ASSESSMENT/ PLAN   [ X ] Discontinue         [  ] Continue    [ X ] Documentation and Verification of current medications       Satisfactory Post Anesthetic Course

## 2025-05-27 NOTE — ED ADULT NURSE NOTE - NSFALLRISKFACTORS_ED_ALL_ED
weakness low HH/Coagulation: Bleeding disorder either through use of anticoagulants or underlying clinical condition(s)

## 2025-05-27 NOTE — ED PROVIDER NOTE - CARE PROVIDER_API CALL
Silver Briggs  Pulmonary Disease  [facility], [address] [address]  Phone: [phone]  Fax: [phone]  Follow Up Time:

## 2025-05-27 NOTE — ED PROVIDER NOTE - OBJECTIVE STATEMENT
Patient is a 55-year-old female with history of stage IV metastatic breast cancer.  Patient states she is currently off chemo but still follows closely with oncology.  Patient denies any chest pain or shortness of breath.  Patient reports generalized weakness but that is not new.  Patient denies any rectal bleeding or bloody or dark stools.  Denies any vomiting blood patient states she has not had blood work done in a while and was told to go get blood work today and was told by her oncologist to come to the ER for a transfusion.  Patient reports history of transfusions in the past.  Patient does not want to stay in the hospital

## 2025-05-27 NOTE — ED PROVIDER NOTE - PROGRESS NOTE DETAILS
signed out to Dr Louis at 2100 pending tranfusion, potassium infusions and reeval  patient states she wants to go home tonight and will not stay in hospital  -md mayra signed out to Dr Benjamin at 2100 pending tranfusion, potassium infusions and reeval  patient states she wants to go home tonight and will not stay in hospital  -md mayra KSENIAG: Received signout from Dr. Benjamin to discharge after transfusion.  Transfusion complete.  Potassium given.  Patient ready to leave and go home.  Will follow up with her doctor.

## 2025-05-27 NOTE — ED ADULT NURSE NOTE - NSFALLHARMRISKINTERV_ED_ALL_ED

## 2025-05-27 NOTE — ED ADULT TRIAGE NOTE - CHIEF COMPLAINT QUOTE
patient presents for low hgb.  had blood work done today, called with hgb of 6.  hx metastatic breast ca, liver cirrhosis.

## 2025-05-28 ENCOUNTER — NON-APPOINTMENT (OUTPATIENT)
Age: 55
End: 2025-05-28

## 2025-05-28 ENCOUNTER — OUTPATIENT (OUTPATIENT)
Dept: OUTPATIENT SERVICES | Facility: HOSPITAL | Age: 55
LOS: 1 days | Discharge: HOME | End: 2025-05-28

## 2025-05-28 VITALS
TEMPERATURE: 98 F | DIASTOLIC BLOOD PRESSURE: 57 MMHG | HEART RATE: 92 BPM | OXYGEN SATURATION: 99 % | SYSTOLIC BLOOD PRESSURE: 93 MMHG | RESPIRATION RATE: 19 BRPM

## 2025-05-28 DIAGNOSIS — D50.9 IRON DEFICIENCY ANEMIA, UNSPECIFIED: ICD-10-CM

## 2025-05-28 DIAGNOSIS — C79.51 SECONDARY MALIGNANT NEOPLASM OF BONE: ICD-10-CM

## 2025-05-28 DIAGNOSIS — Z98.891 HISTORY OF UTERINE SCAR FROM PREVIOUS SURGERY: Chronic | ICD-10-CM

## 2025-05-28 DIAGNOSIS — C50.919 MALIGNANT NEOPLASM OF UNSPECIFIED SITE OF UNSPECIFIED FEMALE BREAST: ICD-10-CM

## 2025-05-28 DIAGNOSIS — Z90.13 ACQUIRED ABSENCE OF BILATERAL BREASTS AND NIPPLES: Chronic | ICD-10-CM

## 2025-05-28 DIAGNOSIS — D69.6 THROMBOCYTOPENIA, UNSPECIFIED: ICD-10-CM

## 2025-05-28 DIAGNOSIS — D64.9 ANEMIA, UNSPECIFIED: ICD-10-CM

## 2025-06-02 ENCOUNTER — APPOINTMENT (OUTPATIENT)
Dept: HEMATOLOGY ONCOLOGY | Facility: CLINIC | Age: 55
End: 2025-06-02

## 2025-06-03 ENCOUNTER — NON-APPOINTMENT (OUTPATIENT)
Age: 55
End: 2025-06-03

## (undated) DEVICE — FORCEP RADIAL JAW 4 W NDL 2.2MM 2.8MM 160CM YELLOW DISP

## (undated) DEVICE — TUBE O2 SUPL CRUSH RESIS CONN SOUTHSIDE ONLY

## (undated) DEVICE — TUBING IV SET SECOND 34" W/O LOK-BLUNT

## (undated) DEVICE — CANISTER SUCTION 1200CC 10/SL

## (undated) DEVICE — MARKER ENDO SPOT EX

## (undated) DEVICE — SYR LUER LOK 30CC

## (undated) DEVICE — TUBING CANNULA SALTER LABS NASAL ADULT 7FT

## (undated) DEVICE — SENSOR O2 FINGER XL ADULT 24/BX 6BX/CA

## (undated) DEVICE — NDL INJ SCLERO INTERJECT 23G

## (undated) DEVICE — SOL IRR NS 0.9% 250ML

## (undated) DEVICE — STERIS DEFENDO 3-PIECE KIT (AIR/WATER, SUCTION & BIOPSY VALVES)

## (undated) DEVICE — TRAP QUICK CATCH  SINGL CHAMBER

## (undated) DEVICE — TUBING SUCTION CONN 6FT STERILE

## (undated) DEVICE — ELCTR GROUNDING PAD ADULT COVIDIEN

## (undated) DEVICE — Device

## (undated) DEVICE — TUBING IV SET GRAVITY 3Y 100" MACRO

## (undated) DEVICE — MASK O2 NON REBREATH 3IN1 ADULT

## (undated) DEVICE — DRSG CURITY GAUZE SPONGE 4 X 4" 12-PLY

## (undated) DEVICE — SOL IRR POUR H2O 500ML

## (undated) DEVICE — FORMALIN CUPS 10% BUFFERED

## (undated) DEVICE — CATH IV SAFE BC 22G X 1" (BLUE)

## (undated) DEVICE — RADIAL JAW 4 LG CAPACITY WITH NDL

## (undated) DEVICE — SYR LUER SLIP TIP 30CC

## (undated) DEVICE — CATH ELCTR GLIDE PRB 7FR

## (undated) DEVICE — BITE BLOCK MAXI RUBBER STAMP

## (undated) DEVICE — PACK IV START WITH CHG

## (undated) DEVICE — SYR ALLIANCE II INFLATION 60ML

## (undated) DEVICE — SNARE POLYP SENS 27MM 240CM

## (undated) DEVICE — CATH ELECHMSTAT  INJ 7FR 210CM

## (undated) DEVICE — BRUSH COLONOSCOPY CYTOLOGY

## (undated) DEVICE — SET IV PUMP BLOOD 1VALVE 180FILTER NON-DEHP

## (undated) DEVICE — BRUSH CYTO ENDO

## (undated) DEVICE — SYR IV POSIFLUSH NS 3ML 30/TY

## (undated) DEVICE — VALVE BIOPSY

## (undated) DEVICE — SUCTION YANKAUER TAPERED BULBOUS NO VENT

## (undated) DEVICE — CATH IV SAFE BC 20G X 1.16" (PINK)